# Patient Record
Sex: FEMALE | Race: WHITE | Employment: OTHER | URBAN - NONMETROPOLITAN AREA
[De-identification: names, ages, dates, MRNs, and addresses within clinical notes are randomized per-mention and may not be internally consistent; named-entity substitution may affect disease eponyms.]

---

## 2017-07-21 ENCOUNTER — HOSPITAL ENCOUNTER (OUTPATIENT)
Dept: GENERAL RADIOLOGY | Age: 82
Discharge: HOME | End: 2017-07-21
Payer: MEDICARE

## 2017-07-21 ENCOUNTER — HOSPITAL ENCOUNTER (OUTPATIENT)
Dept: INFUSION THERAPY | Age: 82
Discharge: HOME | End: 2017-07-21
Payer: MEDICARE

## 2017-07-21 ENCOUNTER — HOSPITAL ENCOUNTER (OUTPATIENT)
Age: 82
Discharge: HOME | End: 2017-07-21
Payer: MEDICARE

## 2017-07-21 VITALS
SYSTOLIC BLOOD PRESSURE: 144 MMHG | RESPIRATION RATE: 18 BRPM | OXYGEN SATURATION: 95 % | DIASTOLIC BLOOD PRESSURE: 71 MMHG | TEMPERATURE: 98.3 F | HEART RATE: 77 BPM

## 2017-07-21 DIAGNOSIS — R06.02 SHORTNESS OF BREATH: ICD-10-CM

## 2017-07-21 LAB
ABSOLUTE EOS #: 0.24 K/UL (ref 0–0.4)
ABSOLUTE LYMPH #: 1.59 K/UL (ref 1–4.8)
ABSOLUTE MONO #: 0.53 K/UL (ref 0–1)
ANION GAP SERPL CALCULATED.3IONS-SCNC: 14 MMOL/L (ref 9–17)
BASOPHILS # BLD: 0 %
BASOPHILS ABSOLUTE: 0 K/UL (ref 0–0.2)
BUN BLDV-MCNC: 19 MG/DL (ref 8–23)
BUN/CREAT BLD: 18 (ref 9–20)
CALCIUM SERPL-MCNC: 8.6 MG/DL (ref 8.6–10.4)
CHLORIDE BLD-SCNC: 106 MMOL/L (ref 98–107)
CO2: 19 MMOL/L (ref 20–31)
CREAT SERPL-MCNC: 1.04 MG/DL (ref 0.5–0.9)
DIFFERENTIAL TYPE: ABNORMAL
EOSINOPHILS RELATIVE PERCENT: 4 %
GFR AFRICAN AMERICAN: >60 ML/MIN
GFR NON-AFRICAN AMERICAN: 50 ML/MIN
GFR SERPL CREATININE-BSD FRML MDRD: ABNORMAL ML/MIN/{1.73_M2}
GFR SERPL CREATININE-BSD FRML MDRD: ABNORMAL ML/MIN/{1.73_M2}
GLUCOSE BLD-MCNC: 103 MG/DL (ref 70–99)
HCT VFR BLD CALC: 20.3 % (ref 36–46)
HCT VFR BLD CALC: 25.4 % (ref 36–46)
HEMOGLOBIN: 6.4 G/DL (ref 12–16)
HEMOGLOBIN: 8.4 G/DL (ref 12–16)
LYMPHOCYTES # BLD: 27 %
MCH RBC QN AUTO: 24.1 PG (ref 26–34)
MCH RBC QN AUTO: 25.8 PG (ref 26–34)
MCHC RBC AUTO-ENTMCNC: 31.7 G/DL (ref 31–37)
MCHC RBC AUTO-ENTMCNC: 33 G/DL (ref 31–37)
MCV RBC AUTO: 75.9 FL (ref 80–100)
MCV RBC AUTO: 78.3 FL (ref 80–100)
MONOCYTES # BLD: 9 %
MORPHOLOGY: ABNORMAL
PDW BLD-RTO: 16.5 % (ref 12.1–15.2)
PDW BLD-RTO: 16.5 % (ref 12.1–15.2)
PLATELET # BLD: 484 K/UL (ref 140–450)
PLATELET # BLD: 532 K/UL (ref 140–450)
PLATELET ESTIMATE: ABNORMAL
PMV BLD AUTO: 7.9 FL (ref 6–12)
PMV BLD AUTO: 8 FL (ref 6–12)
POTASSIUM SERPL-SCNC: 4 MMOL/L (ref 3.7–5.3)
RBC # BLD: 2.67 M/UL (ref 4–5.2)
RBC # BLD: 3.25 M/UL (ref 4–5.2)
RBC # BLD: ABNORMAL 10*6/UL
SEG NEUTROPHILS: 60 %
SEGMENTED NEUTROPHILS ABSOLUTE COUNT: 3.54 K/UL (ref 1.8–7.7)
SODIUM BLD-SCNC: 139 MMOL/L (ref 135–144)
WBC # BLD: 5.9 K/UL (ref 3.5–11)
WBC # BLD: 6 K/UL (ref 3.5–11)
WBC # BLD: ABNORMAL 10*3/UL

## 2017-07-21 PROCEDURE — 36430 TRANSFUSION BLD/BLD COMPNT: CPT

## 2017-07-21 PROCEDURE — 86901 BLOOD TYPING SEROLOGIC RH(D): CPT

## 2017-07-21 PROCEDURE — 86920 COMPATIBILITY TEST SPIN: CPT

## 2017-07-21 PROCEDURE — 2580000003 HC RX 258: Performed by: INTERNAL MEDICINE

## 2017-07-21 PROCEDURE — 71020 XR CHEST STANDARD TWO VW: CPT

## 2017-07-21 PROCEDURE — 86900 BLOOD TYPING SEROLOGIC ABO: CPT

## 2017-07-21 PROCEDURE — 86850 RBC ANTIBODY SCREEN: CPT

## 2017-07-21 PROCEDURE — 80048 BASIC METABOLIC PNL TOTAL CA: CPT

## 2017-07-21 PROCEDURE — P9040 RBC LEUKOREDUCED IRRADIATED: HCPCS

## 2017-07-21 PROCEDURE — 36415 COLL VENOUS BLD VENIPUNCTURE: CPT

## 2017-07-21 PROCEDURE — 85027 COMPLETE CBC AUTOMATED: CPT

## 2017-07-21 PROCEDURE — 85025 COMPLETE CBC W/AUTO DIFF WBC: CPT

## 2017-07-21 RX ORDER — SODIUM CHLORIDE 9 MG/ML
INJECTION, SOLUTION INTRAVENOUS ONCE
Status: COMPLETED | OUTPATIENT
Start: 2017-07-21 | End: 2017-07-21

## 2017-07-21 RX ORDER — MONTELUKAST SODIUM 10 MG/1
10 TABLET ORAL NIGHTLY
COMMUNITY

## 2017-07-21 RX ORDER — 0.9 % SODIUM CHLORIDE 0.9 %
10 VIAL (ML) INJECTION PRN
Status: DISCONTINUED | OUTPATIENT
Start: 2017-07-21 | End: 2017-07-22 | Stop reason: HOSPADM

## 2017-07-21 RX ADMIN — Medication 10 ML: at 13:13

## 2017-07-21 RX ADMIN — SODIUM CHLORIDE: 9 INJECTION, SOLUTION INTRAVENOUS at 14:16

## 2017-07-21 NOTE — IP AVS SNAPSHOT
Patient Information     Patient Name LUANN Del Castillo 1927      Important Information for Heart Failure       If your condition worsens or if you have any concerns, call your doctor or seek emergency medical services (dial 9--1) as needed. If you have any of the following symptoms/conditions, call your doctor. Call your primary care physician to obtain results of outstanding lab tests, cultures, x-rays, or other tests. If you have a current diagnosis or history of any of the following, please review the information carefully. HEART FAILURE PATIENTS:   DISCHARGE WEIGHT:    Record daily weights. Notify your doctor if you have a weight gain 2 pounds in a day, or 3-5 pounds in 1 week. Notify your doctor for increased shortness of breath, or swelling in legs or feet. Follow a low sodium diet. Resume normal activity unless otherwise instructed by your doctor.

## 2017-07-21 NOTE — IP AVS SNAPSHOT
After Visit Summary  (Discharge Instructions)    Medication List for Home    Based on the information you provided to us as well as any changes during this visit, the following is your updated medication list.  Compare this with your prescription bottles at home. If you have any questions or concerns, contact your primary care physician's office. Daily Medication List (This medication list can be shared with any healthcare provider who is helping you manage your medications)      ASK your doctor about these medications if you have questions        Last Dose    Next Dose Due AM NOON PM NIGHT    ACCUPRIL 5 MG tablet   Generic drug:  quinapril   Take 5 mg by mouth nightly                                         levothyroxine 75 MCG tablet   Commonly known as:  SYNTHROID   Take 75 mcg by mouth Daily                                         metoprolol succinate 50 MG extended release tablet   Commonly known as:  TOPROL XL   Take 1.5 tablets by mouth daily                                         montelukast 10 MG tablet   Commonly known as:  SINGULAIR   Take 10 mg by mouth nightly                                         PROTONIX PO   Take 40 mg by mouth                                         vitamin B-12 1000 MCG tablet   Commonly known as:  CYANOCOBALAMIN   Take 1,000 mcg by mouth nightly                                                 Allergies as of 7/21/2017        Reactions    Sulfa Antibiotics       Immunizations as of 7/21/2017     Name Date Dose VIS Date Route    Influenza Vaccine, unspecified formulation 10/12/2015 -- -- --    Comment: uploaded via claim file      Last Vitals          Most Recent Value    Temp  98.3 °F (36.8 °C)    Pulse  73    Resp  16    BP  118/60         After Visit Summary    This summary was created for you. Thank you for entrusting your care to us.   The following information includes details about your hospital/visit stay along with steps you should take to help with your recovery once you leave the hospital.  In this packet, you will find information about the topics listed below:    · Instructions about your medications including a list of your home medications  · A summary of your hospital visit  · Follow-up appointments once you have left the hospital  · Your care plan at home      You may receive a survey regarding the care you received during your stay. Your input is valuable to us. We encourage you to complete and return your survey in the envelope provided. We hope you will choose us in the future for your healthcare needs. Patient Information     Patient Name LUANN Salvador 1927      Care Provided at:     Name Address Phone       [place] [phone]            Your Visit    Here you will find information about your visit, including the reason for your visit. Please take this sheet with you when you visit your doctor or other health care provider in the future. It will help determine the best possible medical care for you at that time. If you have any questions once you leave the hospital, please call the department phone number listed below. Why you were here     Your primary diagnosis was:  Not on File      Visit Information     Date & Time Department Dept. Phone    2017 [address] [place] [address] ([facility])        Follow-up Appointments    Below is a list of your follow-up and future appointments. This may not be a complete list as you may have made appointments directly with providers that we are not aware of or your providers may have made some for you. Please call your providers to confirm appointments. It is important to keep your appointments. Please bring your current insurance card, photo ID, co-pay, and all medication bottles to your appointment. If self-pay, payment is expected at the time of service.         Preventive Care        Date Due Tetanus Combination Vaccine (1 - Tdap) 12/11/1946    Zoster Vaccine 12/11/1987    Pneumococcal Vaccines (two) for all adults aged 72 and over (1 of 2 - PCV13) 12/11/1992    Yearly Flu Vaccine (1) 8/1/2017                 Care Plan Once You Return Home    This section includes instructions you will need to follow once you leave the hospital.  Your care team will discuss these with you, so you and those caring for you know how to best care for your health needs at home. This section may also include educational information about certain health topics that may be of help to you. Discharge Instructions                                         Outpatient Instructions for Transfusions    [address] [facility]  [phone]        Diet:  As prescribed by your physician    Activity:  As directed by your physician    Care of the IV site:  Keep the IV site clean and dry until healed. Notify your physician if the area becomes reddened, swollen or tender. If you observe any of the following symptoms please contact you doctor. · Fever with or without chills (within in 2 weeks)  · Hives or itching  · Chest pain  · Nausea  · Flushing  · Shortness of Breath  · Bleeding  · Blood in your urine  · Mild jaundice - yellowness of your skin or the whites of your eyes (within 2 weeks)        If any adverse reactions occur:  CALL YOUR PHYSICIAN IMMEDIATELY. Prescriptions: Follow up appointment: follow up with Dr. Pauly Mitchell information for a smoker       SMOKING: QUIT SMOKING. THIS IS THE MOST IMPORTANT ACTION YOU CAN TAKE TO IMPROVE YOUR CURRENT AND FUTURE HEALTH. Call the [address] [place] [address] at Alex NOW ([phone])    Smoking harms nonsmokers. When nonsmokers are around people who smoke, they absorb nicotine, carbon monoxide, and other ingredients of tobacco smoke.      DO NOT SMOKE AROUND CHILDREN Children exposed to secondhand smoke are at an increased risk of:  Sudden Infant Death Syndrome (SIDS), acute respiratory infections, inflammation of the middle ear, and severe asthma. Over a longer time, it causes heart disease and lung cancer. There is no safe level of exposure to secondhand smoke. c-LEctahart Signup     V[employer]ix allows you to send messages to your doctor, view your test results, renew your prescriptions, schedule appointments, view visit notes, and more. How Do I Sign Up? 1. In your Internet browser, go to [email or web address]  2. Click on the Sign Up Now link in the Sign In box. You will see the New Member Sign Up page. 3. Enter your V[employer]ix Access Code exactly as it appears below. You will not need to use this code after youve completed the sign-up process. If you do not sign up before the expiration date, you must request a new code. [employer] Access Code: [email or web address]  Expires: 9/19/2017  3:14 PM    4. Enter your Social Security Number ([id number]) and Date of Birth (mm/dd/yyyy) as indicated and click Submit. You will be taken to the next sign-up page. 5. Create a Vessix ID. This will be your Vessix login ID and cannot be changed, so think of one that is secure and easy to remember. 6. Create a V[employer]ix password. You can change your password at any time. 7. Enter your Password Reset Question and Answer. This can be used at a later time if you forget your password. 8. Enter your e-mail address. You will receive e-mail notification when new information is available in [address] 9. Click Sign Up. You can now view your medical record. Additional Information  If you have questions, please contact the physician practice where you receive care. Remember, V[employer]ix is NOT to be used for urgent needs. For medical emergencies, dial 911. For questions regarding your [employer] account call [phone].  If you

## 2017-07-21 NOTE — PROGRESS NOTES
1620 transferred to Room 304 per cart accompied by family. PRBC continue to infuse. Report given to ROSE MARY Daniels RN

## 2017-07-22 LAB
ABO/RH: NORMAL
ANTIBODY SCREEN: NEGATIVE
ARM BAND NUMBER: NORMAL
BLD PROD TYP BPU: NORMAL
CROSSMATCH RESULT: NORMAL
DISPENSE STATUS BLOOD BANK: NORMAL
EXPIRATION DATE: NORMAL
TRANSFUSION STATUS: NORMAL
UNIT DIVISION: 0
UNIT NUMBER: NORMAL

## 2017-07-25 ENCOUNTER — HOSPITAL ENCOUNTER (OUTPATIENT)
Age: 82
Discharge: HOME | End: 2017-07-25
Payer: MEDICARE

## 2017-07-25 LAB
ABSOLUTE EOS #: 0.15 K/UL (ref 0–0.4)
ABSOLUTE LYMPH #: 1.53 K/UL (ref 1–4.8)
ABSOLUTE MONO #: 0.51 K/UL (ref 0–1)
BASOPHILS # BLD: 0 %
BASOPHILS ABSOLUTE: 0 K/UL (ref 0–0.2)
DIFFERENTIAL TYPE: ABNORMAL
EOSINOPHILS RELATIVE PERCENT: 2 %
HCT VFR BLD CALC: 29.6 % (ref 36–46)
HEMOGLOBIN: 9.8 G/DL (ref 12–16)
LYMPHOCYTES # BLD: 21 %
MCH RBC QN AUTO: 25.9 PG (ref 26–34)
MCHC RBC AUTO-ENTMCNC: 33.2 G/DL (ref 31–37)
MCV RBC AUTO: 78.1 FL (ref 80–100)
MONOCYTES # BLD: 7 %
MORPHOLOGY: ABNORMAL
PDW BLD-RTO: 17.3 % (ref 12.1–15.2)
PLATELET # BLD: 506 K/UL (ref 140–450)
PLATELET ESTIMATE: ABNORMAL
PMV BLD AUTO: 8.1 FL (ref 6–12)
RBC # BLD: 3.79 M/UL (ref 4–5.2)
RBC # BLD: ABNORMAL 10*6/UL
SEG NEUTROPHILS: 70 %
SEGMENTED NEUTROPHILS ABSOLUTE COUNT: 5.11 K/UL (ref 1.8–7.7)
WBC # BLD: 7.3 K/UL (ref 3.5–11)
WBC # BLD: ABNORMAL 10*3/UL

## 2017-07-25 PROCEDURE — 85025 COMPLETE CBC W/AUTO DIFF WBC: CPT

## 2017-07-25 PROCEDURE — 36415 COLL VENOUS BLD VENIPUNCTURE: CPT

## 2017-09-05 ENCOUNTER — HOSPITAL ENCOUNTER (OUTPATIENT)
Age: 82
Discharge: HOME | End: 2017-09-05
Payer: MEDICARE

## 2017-09-05 ENCOUNTER — HOSPITAL ENCOUNTER (OUTPATIENT)
Dept: GENERAL RADIOLOGY | Age: 82
Discharge: HOME | End: 2017-09-05
Payer: MEDICARE

## 2017-09-05 DIAGNOSIS — J20.9 ACUTE BRONCHITIS, UNSPECIFIED ORGANISM: ICD-10-CM

## 2017-09-05 LAB
ABSOLUTE EOS #: 0.29 K/UL (ref 0–0.4)
ABSOLUTE LYMPH #: 1.55 K/UL (ref 1–4.8)
ABSOLUTE MONO #: 0.68 K/UL (ref 0–1)
ANION GAP SERPL CALCULATED.3IONS-SCNC: 14 MMOL/L (ref 9–17)
BASOPHILS # BLD: 0 %
BASOPHILS ABSOLUTE: 0 K/UL (ref 0–0.2)
BUN BLDV-MCNC: 17 MG/DL (ref 8–23)
BUN/CREAT BLD: 17 (ref 9–20)
CALCIUM SERPL-MCNC: 8.9 MG/DL (ref 8.6–10.4)
CHLORIDE BLD-SCNC: 102 MMOL/L (ref 98–107)
CO2: 21 MMOL/L (ref 20–31)
CREAT SERPL-MCNC: 1.03 MG/DL (ref 0.5–0.9)
DIFFERENTIAL TYPE: ABNORMAL
EOSINOPHILS RELATIVE PERCENT: 3 %
GFR AFRICAN AMERICAN: >60 ML/MIN
GFR NON-AFRICAN AMERICAN: 50 ML/MIN
GFR SERPL CREATININE-BSD FRML MDRD: ABNORMAL ML/MIN/{1.73_M2}
GFR SERPL CREATININE-BSD FRML MDRD: ABNORMAL ML/MIN/{1.73_M2}
GLUCOSE BLD-MCNC: 114 MG/DL (ref 70–99)
HCT VFR BLD CALC: 30.1 % (ref 36–46)
HEMOGLOBIN: 9.8 G/DL (ref 12–16)
LYMPHOCYTES # BLD: 16 %
MCH RBC QN AUTO: 25 PG (ref 26–34)
MCHC RBC AUTO-ENTMCNC: 32.5 G/DL (ref 31–37)
MCV RBC AUTO: 76.8 FL (ref 80–100)
MONOCYTES # BLD: 7 %
MORPHOLOGY: ABNORMAL
PDW BLD-RTO: 20.2 % (ref 12.1–15.2)
PLATELET # BLD: 432 K/UL (ref 140–450)
PLATELET ESTIMATE: ABNORMAL
PMV BLD AUTO: 7.4 FL (ref 6–12)
POTASSIUM SERPL-SCNC: 4.4 MMOL/L (ref 3.7–5.3)
RBC # BLD: 3.92 M/UL (ref 4–5.2)
RBC # BLD: ABNORMAL 10*6/UL
SEG NEUTROPHILS: 74 %
SEGMENTED NEUTROPHILS ABSOLUTE COUNT: 7.18 K/UL (ref 1.8–7.7)
SODIUM BLD-SCNC: 137 MMOL/L (ref 135–144)
WBC # BLD: 9.7 K/UL (ref 3.5–11)
WBC # BLD: ABNORMAL 10*3/UL

## 2017-09-05 PROCEDURE — 85025 COMPLETE CBC W/AUTO DIFF WBC: CPT

## 2017-09-05 PROCEDURE — 36415 COLL VENOUS BLD VENIPUNCTURE: CPT

## 2017-09-05 PROCEDURE — 80048 BASIC METABOLIC PNL TOTAL CA: CPT

## 2017-09-05 PROCEDURE — 71020 XR CHEST STANDARD TWO VW: CPT

## 2017-09-07 ENCOUNTER — HOSPITAL ENCOUNTER (OUTPATIENT)
Age: 82
Discharge: HOME | End: 2017-09-07
Payer: MEDICARE

## 2017-09-08 ENCOUNTER — HOSPITAL ENCOUNTER (OUTPATIENT)
Age: 82
Discharge: HOME | End: 2017-09-08
Payer: MEDICARE

## 2017-09-08 PROCEDURE — 87493 C DIFF AMPLIFIED PROBE: CPT

## 2017-09-12 LAB
C DIFFICILE TOXINS, PCR: ABNORMAL
SPECIMEN DESCRIPTION: ABNORMAL

## 2018-06-14 ENCOUNTER — HOSPITAL ENCOUNTER (OUTPATIENT)
Age: 83
Discharge: HOME | End: 2018-06-14
Payer: MEDICARE

## 2018-06-14 LAB
ABSOLUTE EOS #: 0.15 K/UL (ref 0–0.44)
ABSOLUTE IMMATURE GRANULOCYTE: <0.03 K/UL (ref 0–0.3)
ABSOLUTE LYMPH #: 1.27 K/UL (ref 1.1–3.7)
ABSOLUTE MONO #: 0.5 K/UL (ref 0.1–1.2)
ALBUMIN SERPL-MCNC: 4.2 G/DL (ref 3.5–5.2)
ALBUMIN/GLOBULIN RATIO: 1.3 (ref 1–2.5)
ALP BLD-CCNC: 108 U/L (ref 35–104)
ALT SERPL-CCNC: 9 U/L (ref 5–33)
ANION GAP SERPL CALCULATED.3IONS-SCNC: 18 MMOL/L (ref 9–17)
AST SERPL-CCNC: 18 U/L
BASOPHILS # BLD: 0 % (ref 0–2)
BASOPHILS ABSOLUTE: <0.03 K/UL (ref 0–0.2)
BILIRUB SERPL-MCNC: 0.21 MG/DL (ref 0.3–1.2)
BUN BLDV-MCNC: 12 MG/DL (ref 8–23)
CALCIUM SERPL-MCNC: 9.3 MG/DL (ref 8.6–10.4)
CHLORIDE BLD-SCNC: 92 MMOL/L (ref 98–107)
CO2: 21 MMOL/L (ref 20–31)
CREAT SERPL-MCNC: 0.99 MG/DL (ref 0.5–0.9)
DIFFERENTIAL TYPE: ABNORMAL
EOSINOPHILS RELATIVE PERCENT: 3 % (ref 1–4)
GFR AFRICAN AMERICAN: >60 ML/MIN
GFR NON-AFRICAN AMERICAN: 53 ML/MIN
GFR SERPL CREATININE-BSD FRML MDRD: ABNORMAL ML/MIN/{1.73_M2}
GFR SERPL CREATININE-BSD FRML MDRD: ABNORMAL ML/MIN/{1.73_M2}
GLUCOSE BLD-MCNC: 125 MG/DL (ref 70–99)
HCT VFR BLD CALC: 31.5 % (ref 36.3–47.1)
HEMOGLOBIN: 9.5 G/DL (ref 11.9–15.1)
IMMATURE GRANULOCYTES: 0 %
IRON SATURATION: 9 % (ref 20–55)
IRON: 34 UG/DL (ref 37–145)
LYMPHOCYTES # BLD: 23 % (ref 24–43)
MCH RBC QN AUTO: 22.5 PG (ref 25.2–33.5)
MCHC RBC AUTO-ENTMCNC: 30.2 G/DL (ref 28.4–34.8)
MCV RBC AUTO: 74.5 FL (ref 82.6–102.9)
MONOCYTES # BLD: 9 % (ref 3–12)
NRBC AUTOMATED: 0 PER 100 WBC
PDW BLD-RTO: 18.8 % (ref 11.8–14.4)
PHOSPHORUS: 3.8 MG/DL (ref 2.6–4.5)
PLATELET # BLD: 403 K/UL (ref 138–453)
PLATELET ESTIMATE: ABNORMAL
PMV BLD AUTO: 9.1 FL (ref 8.1–13.5)
POTASSIUM SERPL-SCNC: 3.7 MMOL/L (ref 3.7–5.3)
RBC # BLD: 4.23 M/UL (ref 3.95–5.11)
RBC # BLD: ABNORMAL 10*6/UL
SEG NEUTROPHILS: 65 % (ref 36–65)
SEGMENTED NEUTROPHILS ABSOLUTE COUNT: 3.5 K/UL (ref 1.5–8.1)
SODIUM BLD-SCNC: 131 MMOL/L (ref 135–144)
TOTAL IRON BINDING CAPACITY: 367 UG/DL (ref 250–450)
TOTAL PROTEIN: 7.5 G/DL (ref 6.4–8.3)
TSH SERPL DL<=0.05 MIU/L-ACNC: 0.4 MIU/L (ref 0.3–5)
UNSATURATED IRON BINDING CAPACITY: 332.8 UG/DL (ref 112–347)
WBC # BLD: 5.5 K/UL (ref 3.5–11.3)
WBC # BLD: ABNORMAL 10*3/UL

## 2018-06-14 PROCEDURE — 85025 COMPLETE CBC W/AUTO DIFF WBC: CPT

## 2018-06-14 PROCEDURE — 80053 COMPREHEN METABOLIC PANEL: CPT

## 2018-06-14 PROCEDURE — 36415 COLL VENOUS BLD VENIPUNCTURE: CPT

## 2018-06-14 PROCEDURE — 83540 ASSAY OF IRON: CPT

## 2018-06-14 PROCEDURE — 84100 ASSAY OF PHOSPHORUS: CPT

## 2018-06-14 PROCEDURE — 84443 ASSAY THYROID STIM HORMONE: CPT

## 2018-06-14 PROCEDURE — 83550 IRON BINDING TEST: CPT

## 2019-07-15 ENCOUNTER — APPOINTMENT (OUTPATIENT)
Dept: NON INVASIVE DIAGNOSTICS | Age: 84
DRG: 291 | End: 2019-07-15
Attending: INTERNAL MEDICINE
Payer: MEDICARE

## 2019-07-15 ENCOUNTER — HOSPITAL ENCOUNTER (INPATIENT)
Age: 84
LOS: 6 days | Discharge: HOME | DRG: 291 | End: 2019-07-21
Attending: INTERNAL MEDICINE | Admitting: INTERNAL MEDICINE
Payer: MEDICARE

## 2019-07-15 ENCOUNTER — APPOINTMENT (OUTPATIENT)
Dept: GENERAL RADIOLOGY | Age: 84
DRG: 291 | End: 2019-07-15
Attending: INTERNAL MEDICINE
Payer: MEDICARE

## 2019-07-15 DIAGNOSIS — I50.33 ACUTE ON CHRONIC DIASTOLIC CONGESTIVE HEART FAILURE (HCC): Primary | ICD-10-CM

## 2019-07-15 PROBLEM — J44.1 ACUTE EXACERBATION OF CHRONIC OBSTRUCTIVE PULMONARY DISEASE (COPD) (HCC): Status: ACTIVE | Noted: 2019-07-15

## 2019-07-15 LAB
ABSOLUTE EOS #: <0.03 K/UL (ref 0–0.44)
ABSOLUTE IMMATURE GRANULOCYTE: 0.05 K/UL (ref 0–0.3)
ABSOLUTE LYMPH #: 1.41 K/UL (ref 1.1–3.7)
ABSOLUTE MONO #: 1.1 K/UL (ref 0.1–1.2)
ANION GAP SERPL CALCULATED.3IONS-SCNC: 15 MMOL/L (ref 9–17)
BASOPHILS # BLD: 0 % (ref 0–2)
BASOPHILS ABSOLUTE: <0.03 K/UL (ref 0–0.2)
BNP INTERPRETATION: ABNORMAL
BUN BLDV-MCNC: 14 MG/DL (ref 8–23)
BUN/CREAT BLD: 15 (ref 9–20)
CALCIUM SERPL-MCNC: 8.7 MG/DL (ref 8.6–10.4)
CHLORIDE BLD-SCNC: 101 MMOL/L (ref 98–107)
CO2: 20 MMOL/L (ref 20–31)
CREAT SERPL-MCNC: 0.93 MG/DL (ref 0.5–0.9)
DIFFERENTIAL TYPE: ABNORMAL
EOSINOPHILS RELATIVE PERCENT: 0 % (ref 1–4)
GFR AFRICAN AMERICAN: >60 ML/MIN
GFR NON-AFRICAN AMERICAN: 57 ML/MIN
GFR SERPL CREATININE-BSD FRML MDRD: ABNORMAL ML/MIN/{1.73_M2}
GFR SERPL CREATININE-BSD FRML MDRD: ABNORMAL ML/MIN/{1.73_M2}
GLUCOSE BLD-MCNC: 124 MG/DL (ref 70–99)
HCT VFR BLD CALC: 25.9 % (ref 36.3–47.1)
HEMOGLOBIN: 7.9 G/DL (ref 11.9–15.1)
IMMATURE GRANULOCYTES: 0 %
LV EF: 65 %
LVEF MODALITY: NORMAL
LYMPHOCYTES # BLD: 11 % (ref 24–43)
MCH RBC QN AUTO: 23.9 PG (ref 25.2–33.5)
MCHC RBC AUTO-ENTMCNC: 30.5 G/DL (ref 28.4–34.8)
MCV RBC AUTO: 78.2 FL (ref 82.6–102.9)
MONOCYTES # BLD: 9 % (ref 3–12)
NRBC AUTOMATED: 0 PER 100 WBC
PDW BLD-RTO: 18.1 % (ref 11.8–14.4)
PLATELET # BLD: 286 K/UL (ref 138–453)
PLATELET ESTIMATE: ABNORMAL
PMV BLD AUTO: 9.4 FL (ref 8.1–13.5)
POTASSIUM SERPL-SCNC: 3.7 MMOL/L (ref 3.7–5.3)
PRO-BNP: 1258 PG/ML
RBC # BLD: 3.31 M/UL (ref 3.95–5.11)
RBC # BLD: ABNORMAL 10*6/UL
SEG NEUTROPHILS: 80 % (ref 36–65)
SEGMENTED NEUTROPHILS ABSOLUTE COUNT: 10.41 K/UL (ref 1.5–8.1)
SODIUM BLD-SCNC: 136 MMOL/L (ref 135–144)
WBC # BLD: 13 K/UL (ref 3.5–11.3)
WBC # BLD: ABNORMAL 10*3/UL

## 2019-07-15 PROCEDURE — 71045 X-RAY EXAM CHEST 1 VIEW: CPT

## 2019-07-15 PROCEDURE — 94664 DEMO&/EVAL PT USE INHALER: CPT

## 2019-07-15 PROCEDURE — 86900 BLOOD TYPING SEROLOGIC ABO: CPT

## 2019-07-15 PROCEDURE — 85025 COMPLETE CBC W/AUTO DIFF WBC: CPT

## 2019-07-15 PROCEDURE — 86901 BLOOD TYPING SEROLOGIC RH(D): CPT

## 2019-07-15 PROCEDURE — 1200000000 HC SEMI PRIVATE

## 2019-07-15 PROCEDURE — 80048 BASIC METABOLIC PNL TOTAL CA: CPT

## 2019-07-15 PROCEDURE — 2580000003 HC RX 258: Performed by: INTERNAL MEDICINE

## 2019-07-15 PROCEDURE — 83880 ASSAY OF NATRIURETIC PEPTIDE: CPT

## 2019-07-15 PROCEDURE — 6360000002 HC RX W HCPCS: Performed by: INTERNAL MEDICINE

## 2019-07-15 PROCEDURE — 93306 TTE W/DOPPLER COMPLETE: CPT

## 2019-07-15 PROCEDURE — 86850 RBC ANTIBODY SCREEN: CPT

## 2019-07-15 PROCEDURE — 94760 N-INVAS EAR/PLS OXIMETRY 1: CPT

## 2019-07-15 PROCEDURE — 36415 COLL VENOUS BLD VENIPUNCTURE: CPT

## 2019-07-15 RX ORDER — FUROSEMIDE 10 MG/ML
INJECTION INTRAMUSCULAR; INTRAVENOUS
Status: DISPENSED
Start: 2019-07-15 | End: 2019-07-16

## 2019-07-15 RX ORDER — SODIUM CHLORIDE 0.9 % (FLUSH) 0.9 %
10 SYRINGE (ML) INJECTION PRN
Status: DISCONTINUED | OUTPATIENT
Start: 2019-07-15 | End: 2019-07-21 | Stop reason: HOSPADM

## 2019-07-15 RX ORDER — LANOLIN ALCOHOL/MO/W.PET/CERES
1000 CREAM (GRAM) TOPICAL NIGHTLY
Status: DISCONTINUED | OUTPATIENT
Start: 2019-07-15 | End: 2019-07-21 | Stop reason: HOSPADM

## 2019-07-15 RX ORDER — ONDANSETRON 2 MG/ML
4 INJECTION INTRAMUSCULAR; INTRAVENOUS EVERY 6 HOURS PRN
Status: DISCONTINUED | OUTPATIENT
Start: 2019-07-15 | End: 2019-07-21 | Stop reason: HOSPADM

## 2019-07-15 RX ORDER — SODIUM CHLORIDE 0.9 % (FLUSH) 0.9 %
10 SYRINGE (ML) INJECTION EVERY 12 HOURS SCHEDULED
Status: DISCONTINUED | OUTPATIENT
Start: 2019-07-15 | End: 2019-07-21 | Stop reason: HOSPADM

## 2019-07-15 RX ORDER — PANTOPRAZOLE SODIUM 40 MG/1
40 TABLET, DELAYED RELEASE ORAL
Status: DISCONTINUED | OUTPATIENT
Start: 2019-07-16 | End: 2019-07-21 | Stop reason: HOSPADM

## 2019-07-15 RX ORDER — LEVOTHYROXINE SODIUM 0.07 MG/1
75 TABLET ORAL DAILY
Status: DISCONTINUED | OUTPATIENT
Start: 2019-07-16 | End: 2019-07-21 | Stop reason: HOSPADM

## 2019-07-15 RX ORDER — SODIUM CHLORIDE 9 MG/ML
INJECTION, SOLUTION INTRAVENOUS CONTINUOUS
Status: DISCONTINUED | OUTPATIENT
Start: 2019-07-15 | End: 2019-07-15

## 2019-07-15 RX ORDER — MONTELUKAST SODIUM 10 MG/1
10 TABLET ORAL NIGHTLY
Status: DISCONTINUED | OUTPATIENT
Start: 2019-07-15 | End: 2019-07-21 | Stop reason: HOSPADM

## 2019-07-15 RX ORDER — LISINOPRIL 5 MG/1
5 TABLET ORAL DAILY
Status: DISCONTINUED | OUTPATIENT
Start: 2019-07-16 | End: 2019-07-16

## 2019-07-15 RX ADMIN — ENOXAPARIN SODIUM 30 MG: 30 INJECTION SUBCUTANEOUS at 16:58

## 2019-07-15 RX ADMIN — Medication 10 ML: at 22:11

## 2019-07-15 RX ADMIN — FUROSEMIDE 5 MG/HR: 10 INJECTION, SOLUTION INTRAMUSCULAR; INTRAVENOUS at 21:29

## 2019-07-15 ASSESSMENT — PAIN SCALES - PAIN ASSESSMENT IN ADVANCED DEMENTIA (PAINAD)
CONSOLABILITY: 0
TOTALSCORE: 2
CONSOLABILITY: 0
FACIALEXPRESSION: 0
BODYLANGUAGE: 0
TOTALSCORE: 0
BREATHING: 0
NEGVOCALIZATION: 1
TOTALSCORE: 0
BREATHING: 0
CONSOLABILITY: 1
BREATHING: 0
BREATHING: 0
FACIALEXPRESSION: 0
BREATHING: 0
TOTALSCORE: 0
FACIALEXPRESSION: 0
FACIALEXPRESSION: 0
NEGVOCALIZATION: 0
NEGVOCALIZATION: 0
BODYLANGUAGE: 0
BODYLANGUAGE: 0
TOTALSCORE: 0
NEGVOCALIZATION: 0
BREATHING: 0
CONSOLABILITY: 0
CONSOLABILITY: 0
NEGVOCALIZATION: 0
BODYLANGUAGE: 0
TOTALSCORE: 0
FACIALEXPRESSION: 0
TOTALSCORE: 0
NEGVOCALIZATION: 0
CONSOLABILITY: 0
BREATHING: 0
CONSOLABILITY: 0
BODYLANGUAGE: 0
FACIALEXPRESSION: 0
BODYLANGUAGE: 0
BODYLANGUAGE: 0
NEGVOCALIZATION: 0
FACIALEXPRESSION: 0

## 2019-07-15 ASSESSMENT — PAIN SCALES - WONG BAKER
WONGBAKER_NUMERICALRESPONSE: 0
WONGBAKER_NUMERICALRESPONSE: 0
WONGBAKER_NUMERICALRESPONSE: 6
WONGBAKER_NUMERICALRESPONSE: 0

## 2019-07-15 ASSESSMENT — PAIN DESCRIPTION - DESCRIPTORS: DESCRIPTORS: ACHING

## 2019-07-15 ASSESSMENT — PAIN DESCRIPTION - PAIN TYPE: TYPE: CHRONIC PAIN

## 2019-07-15 ASSESSMENT — PAIN DESCRIPTION - LOCATION: LOCATION: GENERALIZED;KNEE

## 2019-07-15 ASSESSMENT — PAIN DESCRIPTION - FREQUENCY: FREQUENCY: INTERMITTENT

## 2019-07-15 NOTE — PROGRESS NOTES
[facility] SPECIALTY [facility]  OCCUPATIONAL THERAPY  No Visit Note    [] ICU    [x] Acute   Patient: Watson Cantu  Room: Critical access hospital3302-36      Watson Cantu not seen on 7/15/2019 at 2:54 PM due to nursing finishing IV and imaging present to complete imaging. Will re-attempt at our earliest opportunity.           Signature: Bruce Wood, MOT, OTR/L

## 2019-07-16 LAB
ABSOLUTE EOS #: 0.1 K/UL (ref 0–0.44)
ABSOLUTE IMMATURE GRANULOCYTE: 0.04 K/UL (ref 0–0.3)
ABSOLUTE LYMPH #: 1.69 K/UL (ref 1.1–3.7)
ABSOLUTE MONO #: 0.93 K/UL (ref 0.1–1.2)
ABSOLUTE RETIC #: 0.04 M/UL (ref 0.03–0.08)
ANION GAP SERPL CALCULATED.3IONS-SCNC: 13 MMOL/L (ref 9–17)
BASOPHILS # BLD: 0 % (ref 0–2)
BASOPHILS ABSOLUTE: <0.03 K/UL (ref 0–0.2)
BUN BLDV-MCNC: 12 MG/DL (ref 8–23)
BUN/CREAT BLD: 14 (ref 9–20)
CALCIUM SERPL-MCNC: 8 MG/DL (ref 8.6–10.4)
CHLORIDE BLD-SCNC: 102 MMOL/L (ref 98–107)
CO2: 20 MMOL/L (ref 20–31)
CREAT SERPL-MCNC: 0.87 MG/DL (ref 0.5–0.9)
DIFFERENTIAL TYPE: ABNORMAL
EKG ATRIAL RATE: 92 BPM
EKG P AXIS: 16 DEGREES
EKG P-R INTERVAL: 114 MS
EKG Q-T INTERVAL: 390 MS
EKG QRS DURATION: 70 MS
EKG QTC CALCULATION (BAZETT): 482 MS
EKG R AXIS: -7 DEGREES
EKG T AXIS: 77 DEGREES
EKG VENTRICULAR RATE: 92 BPM
EOSINOPHILS RELATIVE PERCENT: 1 % (ref 1–4)
FOLATE: 9 NG/ML
GFR AFRICAN AMERICAN: >60 ML/MIN
GFR NON-AFRICAN AMERICAN: >60 ML/MIN
GFR SERPL CREATININE-BSD FRML MDRD: ABNORMAL ML/MIN/{1.73_M2}
GFR SERPL CREATININE-BSD FRML MDRD: ABNORMAL ML/MIN/{1.73_M2}
GLUCOSE BLD-MCNC: 123 MG/DL (ref 70–99)
HCT VFR BLD CALC: 22.6 % (ref 36.3–47.1)
HEMOGLOBIN: 7 G/DL (ref 11.9–15.1)
IMMATURE GRANULOCYTES: 0 %
IMMATURE RETIC FRACT: 14.8 % (ref 2.7–18.3)
IRON SATURATION: 6 % (ref 20–55)
IRON: 17 UG/DL (ref 37–145)
LYMPHOCYTES # BLD: 18 % (ref 24–43)
MCH RBC QN AUTO: 23.8 PG (ref 25.2–33.5)
MCHC RBC AUTO-ENTMCNC: 31 G/DL (ref 28.4–34.8)
MCV RBC AUTO: 76.9 FL (ref 82.6–102.9)
MONOCYTES # BLD: 10 % (ref 3–12)
NRBC AUTOMATED: 0 PER 100 WBC
PDW BLD-RTO: 17.9 % (ref 11.8–14.4)
PLATELET # BLD: 268 K/UL (ref 138–453)
PLATELET ESTIMATE: ABNORMAL
PMV BLD AUTO: 9.8 FL (ref 8.1–13.5)
POTASSIUM SERPL-SCNC: 3 MMOL/L (ref 3.7–5.3)
RBC # BLD: 2.94 M/UL (ref 3.95–5.11)
RBC # BLD: ABNORMAL 10*6/UL
RETIC %: 1.3 % (ref 0.5–1.9)
RETIC HEMOGLOBIN: 21.7 PG (ref 28.2–35.7)
SEG NEUTROPHILS: 71 % (ref 36–65)
SEGMENTED NEUTROPHILS ABSOLUTE COUNT: 6.53 K/UL (ref 1.5–8.1)
SODIUM BLD-SCNC: 135 MMOL/L (ref 135–144)
TOTAL IRON BINDING CAPACITY: 276 UG/DL (ref 250–450)
UNSATURATED IRON BINDING CAPACITY: 259 UG/DL (ref 112–347)
VITAMIN B-12: 1148 PG/ML (ref 232–1245)
WBC # BLD: 9.3 K/UL (ref 3.5–11.3)
WBC # BLD: ABNORMAL 10*3/UL

## 2019-07-16 PROCEDURE — 93005 ELECTROCARDIOGRAM TRACING: CPT | Performed by: INTERNAL MEDICINE

## 2019-07-16 PROCEDURE — 1200000000 HC SEMI PRIVATE

## 2019-07-16 PROCEDURE — 93010 ELECTROCARDIOGRAM REPORT: CPT | Performed by: INTERNAL MEDICINE

## 2019-07-16 PROCEDURE — 83540 ASSAY OF IRON: CPT

## 2019-07-16 PROCEDURE — 97530 THERAPEUTIC ACTIVITIES: CPT

## 2019-07-16 PROCEDURE — 82607 VITAMIN B-12: CPT

## 2019-07-16 PROCEDURE — 80048 BASIC METABOLIC PNL TOTAL CA: CPT

## 2019-07-16 PROCEDURE — 83550 IRON BINDING TEST: CPT

## 2019-07-16 PROCEDURE — 99221 1ST HOSP IP/OBS SF/LOW 40: CPT | Performed by: INTERNAL MEDICINE

## 2019-07-16 PROCEDURE — 6360000002 HC RX W HCPCS: Performed by: INTERNAL MEDICINE

## 2019-07-16 PROCEDURE — 82746 ASSAY OF FOLIC ACID SERUM: CPT

## 2019-07-16 PROCEDURE — 97110 THERAPEUTIC EXERCISES: CPT

## 2019-07-16 PROCEDURE — 85025 COMPLETE CBC W/AUTO DIFF WBC: CPT

## 2019-07-16 PROCEDURE — 85045 AUTOMATED RETICULOCYTE COUNT: CPT

## 2019-07-16 PROCEDURE — 97166 OT EVAL MOD COMPLEX 45 MIN: CPT

## 2019-07-16 PROCEDURE — 97163 PT EVAL HIGH COMPLEX 45 MIN: CPT

## 2019-07-16 PROCEDURE — 90670 PCV13 VACCINE IM: CPT | Performed by: INTERNAL MEDICINE

## 2019-07-16 PROCEDURE — 36415 COLL VENOUS BLD VENIPUNCTURE: CPT

## 2019-07-16 PROCEDURE — 6370000000 HC RX 637 (ALT 250 FOR IP): Performed by: INTERNAL MEDICINE

## 2019-07-16 PROCEDURE — 2580000003 HC RX 258: Performed by: INTERNAL MEDICINE

## 2019-07-16 PROCEDURE — G0009 ADMIN PNEUMOCOCCAL VACCINE: HCPCS | Performed by: INTERNAL MEDICINE

## 2019-07-16 RX ORDER — LISINOPRIL 10 MG/1
10 TABLET ORAL EVERY 12 HOURS SCHEDULED
Status: DISCONTINUED | OUTPATIENT
Start: 2019-07-16 | End: 2019-07-21 | Stop reason: HOSPADM

## 2019-07-16 RX ORDER — SODIUM CHLORIDE 9 MG/ML
INJECTION, SOLUTION INTRAVENOUS CONTINUOUS
Status: DISCONTINUED | OUTPATIENT
Start: 2019-07-16 | End: 2019-07-19

## 2019-07-16 RX ADMIN — LISINOPRIL 10 MG: 10 TABLET ORAL at 20:59

## 2019-07-16 RX ADMIN — LEVOTHYROXINE SODIUM 75 MCG: 75 TABLET ORAL at 07:37

## 2019-07-16 RX ADMIN — LISINOPRIL 5 MG: 5 TABLET ORAL at 07:38

## 2019-07-16 RX ADMIN — ENOXAPARIN SODIUM 30 MG: 30 INJECTION SUBCUTANEOUS at 09:14

## 2019-07-16 RX ADMIN — SODIUM CHLORIDE: 9 INJECTION, SOLUTION INTRAVENOUS at 11:00

## 2019-07-16 RX ADMIN — CYANOCOBALAMIN TAB 1000 MCG 1000 MCG: 1000 TAB at 20:59

## 2019-07-16 RX ADMIN — PANTOPRAZOLE SODIUM 40 MG: 40 TABLET, DELAYED RELEASE ORAL at 07:37

## 2019-07-16 RX ADMIN — MONTELUKAST SODIUM 10 MG: 10 TABLET, FILM COATED ORAL at 20:59

## 2019-07-16 RX ADMIN — METOPROLOL SUCCINATE 75 MG: 50 TABLET, FILM COATED, EXTENDED RELEASE ORAL at 07:37

## 2019-07-16 RX ADMIN — PNEUMOCOCCAL 13-VALENT CONJUGATE VACCINE 0.5 ML: 2.2; 2.2; 2.2; 2.2; 2.2; 4.4; 2.2; 2.2; 2.2; 2.2; 2.2; 2.2; 2.2 INJECTION, SUSPENSION INTRAMUSCULAR at 14:15

## 2019-07-16 ASSESSMENT — PAIN SCALES - PAIN ASSESSMENT IN ADVANCED DEMENTIA (PAINAD)
NEGVOCALIZATION: 0
TOTALSCORE: 0
TOTALSCORE: 0
NEGVOCALIZATION: 0
NEGVOCALIZATION: 0
CONSOLABILITY: 0
CONSOLABILITY: 0
BODYLANGUAGE: 0
CONSOLABILITY: 0
FACIALEXPRESSION: 0
TOTALSCORE: 0
CONSOLABILITY: 0
FACIALEXPRESSION: 0
FACIALEXPRESSION: 0
BODYLANGUAGE: 0
BREATHING: 0
BREATHING: 0
FACIALEXPRESSION: 0
NEGVOCALIZATION: 0
CONSOLABILITY: 0
BREATHING: 0
BREATHING: 0
BODYLANGUAGE: 0
TOTALSCORE: 0
CONSOLABILITY: 0
FACIALEXPRESSION: 0
FACIALEXPRESSION: 0
BREATHING: 0
BREATHING: 0
FACIALEXPRESSION: 0
TOTALSCORE: 0
FACIALEXPRESSION: 0
NEGVOCALIZATION: 0
CONSOLABILITY: 0
NEGVOCALIZATION: 0
BREATHING: 0
BREATHING: 0
NEGVOCALIZATION: 0
CONSOLABILITY: 0
BODYLANGUAGE: 0
BREATHING: 0
TOTALSCORE: 0
NEGVOCALIZATION: 0
BODYLANGUAGE: 0
FACIALEXPRESSION: 0
TOTALSCORE: 0
TOTALSCORE: 0
BODYLANGUAGE: 0
CONSOLABILITY: 0
BODYLANGUAGE: 0
NEGVOCALIZATION: 0
TOTALSCORE: 0
BODYLANGUAGE: 0
CONSOLABILITY: 0
NEGVOCALIZATION: 0
FACIALEXPRESSION: 0
BREATHING: 0
TOTALSCORE: 0

## 2019-07-16 ASSESSMENT — PAIN SCALES - WONG BAKER
WONGBAKER_NUMERICALRESPONSE: 0

## 2019-07-16 NOTE — PROGRESS NOTES
Met with Patient and daughter this p.m. Patient is alert, non verbal throughout this visit. Daughter states that this is how she normally is mentally. Diagnosis of dementia noted. Patient is a 80year old  white female admitted with acute exacerbation of COPD. Patient lives with her daughter in daughters home in [facility] [facility] [place] [facility]. She uses a transport chair, a shower chair, and has hand rails on her commode. No outside services currently in place. Family provides for all of her needs. Has caregivers while daughter and son in law are working. PCP is Dr. Jaz Hampton. Daughter reports that they have no trouble in affording Patient's medications. Plan for Patient is to return home to her daughters home upon discharge. Patient is a 'DNR-CCA' code status. Has a DPOAHC on file. Discussed community resource options with daughter including respite funds available through our local [employer]. Daughter voices her appreciation, reassurance and support provided to her at this time. LSW to monitor and assist with discharge planning needs as appropriate.     Laurie. Danuta Carpenter[address]  7/16/2019

## 2019-07-16 NOTE — PROGRESS NOTES
Nutrition Assessment    Type and Reason for Visit: Initial    Nutrition Recommendations:   1. Continue current diet. 2. Encourage PO intakes. 3. F/u H/H and transfusion plan. Nutrition Assessment: Altered nutrition labs: H/H r/t unknown etiology AEB Hgb 7.0, transfusion plan after further diuresis. Pt with CHF and is undergoing diuresis. K+ is low. BNP 1258. Pt is asleep. Malnutrition Assessment:  · Malnutrition Status: At risk for malnutrition  · Context: Acute illness or injury  · Findings of the 6 clinical characteristics of malnutrition (Minimum of 2 out of 6 clinical characteristics is required to make the diagnosis of moderate or severe Protein Calorie Malnutrition based on AND/ASPEN Guidelines):  1. Energy Intake-Unable to assess,      2. Weight Loss-Unable to assess,    3. Fat Loss-Mild subcutaneous fat loss, Fat overlying the ribs  4. Muscle Loss-No significant muscle mass loss,    5. Fluid Accumulation-Unable to assess,    6.  Strength-Not measured    Nutrition Risk Level:  Moderate    Nutrient Needs:  · Estimated Daily Total Kcal: 2111-1238(86-36/FF)  · Estimated Daily Protein (g): 61-71g(1.2-1.4g/kg)  · Estimated Daily Total Fluid (ml/day): 1275 ml(25/kg)    Nutrition Diagnosis:   · Problem: Altered nutrition-related lab values(low H/H)  · Etiology: related to Other (Comment)(unknown etiology)     Signs and symptoms:  as evidenced by Lab values(Hgb 7.0, transfusion planned)    Objective Information:  · Nutrition-Focused Physical Findings: mild malnutrition indices  · Wound Type: None  · Current Nutrition Therapies:  · Oral Diet Orders: 2gm Sodium   · Oral Diet intake: Unable to assess  · Oral Nutrition Supplement (ONS) Orders: None  · Anthropometric Measures:  · Ht: 4' 9\" (144.8 cm)   · Current Body Wt: 112 lb 8 oz (51 kg)  · Admission Body Wt: 112 lb 7 oz (51 kg)  · Usual Body Wt: (limited historical weights)  · % Weight Change:  ,  (unknown)  · Ideal Body Wt: 94 lb (42.6 kg), %

## 2019-07-16 NOTE — PROGRESS NOTES
Karen Romero MD  Referral Date : 07/15/19  Diagnosis: Acute exacerbation of COPD, J44.1  Follows Commands: Impaired  Subjective  Subjective: Patient unable to verbalize pain level or use the visual pain scale due to her advanced dementia. She was sleeping comforably upon PT entry.   Pain Screening  Patient Currently in Pain: No          Orientation  Orientation  Overall Orientation Status: Impaired  Orientation Level: Unable to assess  Social/Functional History  Social/Functional History  Lives With: Daughter  Type of Home: House  Home Layout: One level  Home Access: Ramped entrance  Bathroom Shower/Tub: Tub/Shower unit, Shower chair with back  Home Equipment: [address] Help From: Family  ADL Assistance: Needs assistance  Homemaking Assistance: Needs assistance  Homemaking Responsibilities: No  Ambulation Assistance: Needs assistance  Transfer Assistance: Needs assistance  Active : No  Cognition   Cognition  Overall Cognitive Status: Exceptions  Arousal/Alertness: Inconsistent responses to stimuli  Following Commands: Inconsistently follows commands  Insights: Not aware of deficits  Initiation: Requires cues for all  Sequencing: Requires cues for all    Objective     Observation/Palpation  Posture: Poor    PROM RLE (degrees)  RLE PROM: WFL  PROM LLE (degrees)  LLE PROM: WFL  Strength RLE  Comment: Unable to assess due to advanced dementia  Strength LLE  Comment: Unable to assess due to advanced dementia        Bed mobility  Supine to Sit: Maximum assistance;2 Person assistance  Transfers  Sit to Stand: Maximum Assistance  Stand to sit: Maximum Assistance  Squat Pivot Transfers: Maximum Assistance  Ambulation  Ambulation?: No     Balance  Posture: Poor  Sitting - Static: Fair;-  Sitting - Dynamic: Poor  Standing - Static: Poor  Standing - Dynamic: Poor        Plan   Plan  Times per week: 7  Times per day: Twice a day  Plan Comment: 1x/day on weekends  Safety Devices  Type of devices: All fall risk precautions in place    Goals  Short term goals  Time Frame for Short term goals: 10 days  Short term goal 1: Patient will be moderate assistance with bed mobility  Short term goal 2: Patient will participate with stand pivot transfers and will require mod-max assist.  Patient Goals   Patient goals : unable to state goals due to dementia.   Her family member reports hoping the patient will be able to assist with stand pivot transfers       Therapy Time   Individual Concurrent Group Co-treatment   Time In 1043         Time Out [address] [facility], [place], [facility]

## 2019-07-17 PROBLEM — I50.33 ACUTE ON CHRONIC DIASTOLIC CONGESTIVE HEART FAILURE (HCC): Status: ACTIVE | Noted: 2019-07-15

## 2019-07-17 PROBLEM — G30.1 LATE ONSET ALZHEIMER DISEASE (HCC): Chronic | Status: ACTIVE | Noted: 2019-07-17

## 2019-07-17 PROBLEM — F02.80 LATE ONSET ALZHEIMER DISEASE (HCC): Chronic | Status: ACTIVE | Noted: 2019-07-17

## 2019-07-17 LAB
ABSOLUTE EOS #: 0.2 K/UL (ref 0–0.44)
ABSOLUTE IMMATURE GRANULOCYTE: 0.04 K/UL (ref 0–0.3)
ABSOLUTE LYMPH #: 1.76 K/UL (ref 1.1–3.7)
ABSOLUTE MONO #: 0.84 K/UL (ref 0.1–1.2)
ANION GAP SERPL CALCULATED.3IONS-SCNC: 14 MMOL/L (ref 9–17)
BASOPHILS # BLD: 0 % (ref 0–2)
BASOPHILS ABSOLUTE: <0.03 K/UL (ref 0–0.2)
BUN BLDV-MCNC: 17 MG/DL (ref 8–23)
BUN/CREAT BLD: 15 (ref 9–20)
CALCIUM SERPL-MCNC: 8 MG/DL (ref 8.6–10.4)
CHLORIDE BLD-SCNC: 100 MMOL/L (ref 98–107)
CO2: 21 MMOL/L (ref 20–31)
CREAT SERPL-MCNC: 1.13 MG/DL (ref 0.5–0.9)
DIFFERENTIAL TYPE: ABNORMAL
EOSINOPHILS RELATIVE PERCENT: 2 % (ref 1–4)
GFR AFRICAN AMERICAN: 55 ML/MIN
GFR NON-AFRICAN AMERICAN: 45 ML/MIN
GFR SERPL CREATININE-BSD FRML MDRD: ABNORMAL ML/MIN/{1.73_M2}
GFR SERPL CREATININE-BSD FRML MDRD: ABNORMAL ML/MIN/{1.73_M2}
GLUCOSE BLD-MCNC: 116 MG/DL (ref 70–99)
HCT VFR BLD CALC: 24.3 % (ref 36.3–47.1)
HEMOGLOBIN: 7.3 G/DL (ref 11.9–15.1)
IMMATURE GRANULOCYTES: 0 %
LYMPHOCYTES # BLD: 17 % (ref 24–43)
MCH RBC QN AUTO: 23.5 PG (ref 25.2–33.5)
MCHC RBC AUTO-ENTMCNC: 30 G/DL (ref 28.4–34.8)
MCV RBC AUTO: 78.1 FL (ref 82.6–102.9)
MONOCYTES # BLD: 8 % (ref 3–12)
NRBC AUTOMATED: 0 PER 100 WBC
PDW BLD-RTO: 17.8 % (ref 11.8–14.4)
PLATELET # BLD: 324 K/UL (ref 138–453)
PLATELET ESTIMATE: ABNORMAL
PMV BLD AUTO: 10.6 FL (ref 8.1–13.5)
POTASSIUM SERPL-SCNC: 3.3 MMOL/L (ref 3.7–5.3)
RBC # BLD: 3.11 M/UL (ref 3.95–5.11)
RBC # BLD: ABNORMAL 10*6/UL
SEG NEUTROPHILS: 73 % (ref 36–65)
SEGMENTED NEUTROPHILS ABSOLUTE COUNT: 7.34 K/UL (ref 1.5–8.1)
SODIUM BLD-SCNC: 135 MMOL/L (ref 135–144)
WBC # BLD: 10.2 K/UL (ref 3.5–11.3)
WBC # BLD: ABNORMAL 10*3/UL

## 2019-07-17 PROCEDURE — 36415 COLL VENOUS BLD VENIPUNCTURE: CPT

## 2019-07-17 PROCEDURE — 85025 COMPLETE CBC W/AUTO DIFF WBC: CPT

## 2019-07-17 PROCEDURE — 99232 SBSQ HOSP IP/OBS MODERATE 35: CPT | Performed by: INTERNAL MEDICINE

## 2019-07-17 PROCEDURE — 36430 TRANSFUSION BLD/BLD COMPNT: CPT

## 2019-07-17 PROCEDURE — 97110 THERAPEUTIC EXERCISES: CPT

## 2019-07-17 PROCEDURE — 80048 BASIC METABOLIC PNL TOTAL CA: CPT

## 2019-07-17 PROCEDURE — P9016 RBC LEUKOCYTES REDUCED: HCPCS

## 2019-07-17 PROCEDURE — 6370000000 HC RX 637 (ALT 250 FOR IP): Performed by: INTERNAL MEDICINE

## 2019-07-17 PROCEDURE — 1200000000 HC SEMI PRIVATE

## 2019-07-17 PROCEDURE — 2580000003 HC RX 258: Performed by: INTERNAL MEDICINE

## 2019-07-17 RX ORDER — 0.9 % SODIUM CHLORIDE 0.9 %
250 INTRAVENOUS SOLUTION INTRAVENOUS ONCE
Status: COMPLETED | OUTPATIENT
Start: 2019-07-17 | End: 2019-07-18

## 2019-07-17 RX ADMIN — PANTOPRAZOLE SODIUM 40 MG: 40 TABLET, DELAYED RELEASE ORAL at 07:32

## 2019-07-17 RX ADMIN — MONTELUKAST SODIUM 10 MG: 10 TABLET, FILM COATED ORAL at 21:22

## 2019-07-17 RX ADMIN — CYANOCOBALAMIN TAB 1000 MCG 1000 MCG: 1000 TAB at 21:22

## 2019-07-17 RX ADMIN — LISINOPRIL 10 MG: 10 TABLET ORAL at 08:52

## 2019-07-17 RX ADMIN — LISINOPRIL 10 MG: 10 TABLET ORAL at 21:22

## 2019-07-17 RX ADMIN — LEVOTHYROXINE SODIUM 75 MCG: 75 TABLET ORAL at 07:32

## 2019-07-17 RX ADMIN — SODIUM CHLORIDE 250 ML: 9 INJECTION, SOLUTION INTRAVENOUS at 15:35

## 2019-07-17 RX ADMIN — METOPROLOL SUCCINATE 75 MG: 50 TABLET, FILM COATED, EXTENDED RELEASE ORAL at 08:51

## 2019-07-17 ASSESSMENT — PAIN SCALES - PAIN ASSESSMENT IN ADVANCED DEMENTIA (PAINAD)
FACIALEXPRESSION: 0
CONSOLABILITY: 0
BREATHING: 0
NEGVOCALIZATION: 0
TOTALSCORE: 0
BODYLANGUAGE: 0
TOTALSCORE: 0
FACIALEXPRESSION: 0
CONSOLABILITY: 0
BREATHING: 0
FACIALEXPRESSION: 0
BREATHING: 0
BODYLANGUAGE: 0
FACIALEXPRESSION: 0
NEGVOCALIZATION: 0
FACIALEXPRESSION: 0
TOTALSCORE: 0
TOTALSCORE: 0
BODYLANGUAGE: 0
BODYLANGUAGE: 0
NEGVOCALIZATION: 0
FACIALEXPRESSION: 0
CONSOLABILITY: 0
BODYLANGUAGE: 0
NEGVOCALIZATION: 0
CONSOLABILITY: 0
TOTALSCORE: 0
BODYLANGUAGE: 0
BODYLANGUAGE: 0
FACIALEXPRESSION: 0
CONSOLABILITY: 0
BREATHING: 0
NEGVOCALIZATION: 0
NEGVOCALIZATION: 0
TOTALSCORE: 0
TOTALSCORE: 0
FACIALEXPRESSION: 0
NEGVOCALIZATION: 0
FACIALEXPRESSION: 0
CONSOLABILITY: 0
FACIALEXPRESSION: 0
BREATHING: 0
CONSOLABILITY: 0
BREATHING: 0
BREATHING: 0

## 2019-07-17 ASSESSMENT — PAIN SCALES - WONG BAKER
WONGBAKER_NUMERICALRESPONSE: 0

## 2019-07-17 NOTE — PROGRESS NOTES
Problems:    * No resolved hospital problems. *  Anemia    Plan:   Transfuse 1 unit of RBC today.     Raghu Saucedo MD  Rounding

## 2019-07-17 NOTE — PROGRESS NOTES
Bernice King am scribing for and in the presence of Claudette Tellez MD, F.A.C.C..    Patient: Agusto Keller  : 1927  Date of Admission: 7/15/2019  Primary Care Physician: Ivelsise Harrison  Today's Date: 2019    REASON FOR CONSULTATION: Acute on chronic heart failure with preserved ejection fraction. Severe pulmonary hypertension. HPI: Ms. Marsha Santos is a 80 y.o. female who was admitted to the hospital with weakness and progressive shortness of breath. Currently treated for acute on chronic diastolic CHF and iron deficiency anemia. As per her daughter. Patient is being weak and short of breath for the past few days. She was also wheezing so she thought to give her inhaler but her breathing did not improve. Daughter also reported that the patient has trouble laying on her back with cough and shortness of breath. She does not seem to be in any pain. As per daughter, no significant weight gain. No leg swelling but daughter noticed swelling of the face when she is sleeping on her side. She also noticed swelling of her hands. Her appetite is okay. With regard to her functional capacity, patient is largely wheelchair-bound. She used to stand and take few steps using her walker but now she is too weak. Today she looks sleepy and lethargic. She is being given packed RBCs. Slow infusion over 4 hours. She is hemodynamically stable. Her breathing looks comfortable. Last hemoglobin 7.3    Past Medical History:   Diagnosis Date    Arthritis     Asthma     CHF (congestive heart failure) ([address]     Diverticulitis     Female stress incontinence     Hyperlipidemia     Hypertension     Thyroid disease        CURRENT ALLERGIES: Sulfa antibiotics REVIEW OF SYSTEMS: Limited because patient does not give history.      Past Surgical History:   Procedure Laterality Date    CARPAL TUNNEL RELEASE      CATARACT REMOVAL      CHOLECYSTECTOMY      HYSTERECTOMY      Social Metoprolol succinate (Toprol XL) 75 mg  mg daily.  ACE Inibitor/ARB: Continue lisinopril 10 mg daily.  Diuretics: Continue Lasix drip.  Closely monitor kidney function and electrolytes.  Daily weight, fluid restriction and low-salt diet. · Anemia: 1 unit over 4 hours with continuous diuresis to avoid volume overload. · HgB 7.3 as of 7/17/19. · Her daughter said that she had peptic ulcer disease few years ago. · No obvious source of bleeding now. · Iron profile is suggestive of iron deficiency anemia. · We will follow-up. Once again, thank you for allowing me to participate in this patients care. Please do not hesitate to contact me if I could be of any further assistance. Sincerely,  Pauly Gaytan MD, F.A.C.C. [facility] Specialist    [address]  Phone: [phone], Fax: [phone]     I believe that the risk of significant morbidity and mortality related to the patient's current medical conditions are: Intermediate to high. The documentation recorded by the scribe, accurately and completely reflects the services I personally performed and the decisions made by me. Pauly Gaytan MD, F.A.C.C.  July 17, 2019

## 2019-07-17 NOTE — H&P
[address], [place] [address]                              HISTORY AND PHYSICAL    PATIENT NAME: Rosio Negrete                  :        1927  MED REC NO:   [id number]                              ROOM:       0320  ACCOUNT NO:   [de-identified]                           ADMIT DATE: 07/15/2019  PROVIDER:     Georgia Ortiz    CHIEF COMPLAINT:  The patient is admitted with shortness of breath and  weakness. HISTORY OF PRESENT ILLNESS:  She was brought to the office where she was  noted to be quite pale, very fatigued, exhausted, and not able to  respond much. She was hypoxic. She was short of breath, the  conjunctiva were quite pale, and she has not been able to cooperate even  to standup. PAST MEDICAL HISTORY:  Known to have chronic diastolic CHF, remote  history of anemia secondary to GI bleed, Alzheimer's type dementia, and  hypertension. SOCIAL HISTORY:  . Nonsmoker. Lives with the daughter and her  family. REVIEW OF SYSTEMS:  Not available. PHYSICAL EXAMINATION:  GENERAL:  Shows an elderly white female who looks very pale and short of  breath at rest.  Her interaction is much more limited than usual.  HEENT:  Head is normal.  NECK:  Trachea is midline. JVP engorged. CHEST:  Symmetrical, expansions are equal.  HEART:  Regular, rate of about 120 per minute. No murmurs. LUNGS:  Reveal rales in both the lungs, clearly two-thirds of the way  from the base. ABDOMEN:  Protuberant with no masses or tenderness. Bowel sounds are  normal.  EXTREMITIES:  Reveal 1+ or more dependant edema on both legs. BACK:  She has presacral edema. CNS:  Shows the patient is lethargic. Moves all extremities. IMPRESSION:  Severe anemia, congestive heart failure. PLAN:  Echocardiogram.  IV furosemide infusion. Nasal oxygen. X-rays  appropriately. Symptomatic treatment.         Rosalina WILLIAMSON:

## 2019-07-18 ENCOUNTER — APPOINTMENT (OUTPATIENT)
Dept: GENERAL RADIOLOGY | Age: 84
DRG: 291 | End: 2019-07-18
Attending: INTERNAL MEDICINE
Payer: MEDICARE

## 2019-07-18 ENCOUNTER — APPOINTMENT (OUTPATIENT)
Dept: CT IMAGING | Age: 84
DRG: 291 | End: 2019-07-18
Attending: INTERNAL MEDICINE
Payer: MEDICARE

## 2019-07-18 LAB
ABSOLUTE EOS #: 0.28 K/UL (ref 0–0.44)
ABSOLUTE IMMATURE GRANULOCYTE: 0.08 K/UL (ref 0–0.3)
ABSOLUTE LYMPH #: 1.33 K/UL (ref 1.1–3.7)
ABSOLUTE MONO #: 0.97 K/UL (ref 0.1–1.2)
ALBUMIN SERPL-MCNC: 3.5 G/DL (ref 3.5–5.2)
ALBUMIN/GLOBULIN RATIO: 1.2 (ref 1–2.5)
ALP BLD-CCNC: 78 U/L (ref 35–104)
ALT SERPL-CCNC: 11 U/L (ref 5–33)
ANION GAP SERPL CALCULATED.3IONS-SCNC: 18 MMOL/L (ref 9–17)
AST SERPL-CCNC: 17 U/L
BASOPHILS # BLD: 0 % (ref 0–2)
BASOPHILS ABSOLUTE: <0.03 K/UL (ref 0–0.2)
BILIRUB SERPL-MCNC: 0.27 MG/DL (ref 0.3–1.2)
BUN BLDV-MCNC: 23 MG/DL (ref 8–23)
BUN/CREAT BLD: 18 (ref 9–20)
CALCIUM SERPL-MCNC: 7.9 MG/DL (ref 8.6–10.4)
CHLORIDE BLD-SCNC: 96 MMOL/L (ref 98–107)
CO2: 23 MMOL/L (ref 20–31)
CREAT SERPL-MCNC: 1.27 MG/DL (ref 0.5–0.9)
DIFFERENTIAL TYPE: ABNORMAL
EOSINOPHILS RELATIVE PERCENT: 3 % (ref 1–4)
GFR AFRICAN AMERICAN: 48 ML/MIN
GFR NON-AFRICAN AMERICAN: 39 ML/MIN
GFR SERPL CREATININE-BSD FRML MDRD: ABNORMAL ML/MIN/{1.73_M2}
GFR SERPL CREATININE-BSD FRML MDRD: ABNORMAL ML/MIN/{1.73_M2}
GLUCOSE BLD-MCNC: 107 MG/DL (ref 70–99)
HCT VFR BLD CALC: 30.6 % (ref 36.3–47.1)
HEMOGLOBIN: 9.6 G/DL (ref 11.9–15.1)
IMMATURE GRANULOCYTES: 1 %
LYMPHOCYTES # BLD: 14 % (ref 24–43)
MCH RBC QN AUTO: 24.9 PG (ref 25.2–33.5)
MCHC RBC AUTO-ENTMCNC: 31.4 G/DL (ref 28.4–34.8)
MCV RBC AUTO: 79.5 FL (ref 82.6–102.9)
MONOCYTES # BLD: 10 % (ref 3–12)
NRBC AUTOMATED: 0 PER 100 WBC
PDW BLD-RTO: 17 % (ref 11.8–14.4)
PLATELET # BLD: 285 K/UL (ref 138–453)
PLATELET ESTIMATE: ABNORMAL
PMV BLD AUTO: 9.7 FL (ref 8.1–13.5)
POTASSIUM SERPL-SCNC: 3.8 MMOL/L (ref 3.7–5.3)
RBC # BLD: 3.85 M/UL (ref 3.95–5.11)
RBC # BLD: ABNORMAL 10*6/UL
SEG NEUTROPHILS: 72 % (ref 36–65)
SEGMENTED NEUTROPHILS ABSOLUTE COUNT: 6.85 K/UL (ref 1.5–8.1)
SODIUM BLD-SCNC: 137 MMOL/L (ref 135–144)
TOTAL PROTEIN: 6.4 G/DL (ref 6.4–8.3)
WBC # BLD: 9.5 K/UL (ref 3.5–11.3)
WBC # BLD: ABNORMAL 10*3/UL

## 2019-07-18 PROCEDURE — 71045 X-RAY EXAM CHEST 1 VIEW: CPT

## 2019-07-18 PROCEDURE — 97530 THERAPEUTIC ACTIVITIES: CPT

## 2019-07-18 PROCEDURE — 80053 COMPREHEN METABOLIC PANEL: CPT

## 2019-07-18 PROCEDURE — 6360000002 HC RX W HCPCS: Performed by: INTERNAL MEDICINE

## 2019-07-18 PROCEDURE — 1200000000 HC SEMI PRIVATE

## 2019-07-18 PROCEDURE — 71250 CT THORAX DX C-: CPT

## 2019-07-18 PROCEDURE — 36415 COLL VENOUS BLD VENIPUNCTURE: CPT

## 2019-07-18 PROCEDURE — 2580000003 HC RX 258: Performed by: INTERNAL MEDICINE

## 2019-07-18 PROCEDURE — 85025 COMPLETE CBC W/AUTO DIFF WBC: CPT

## 2019-07-18 PROCEDURE — 97110 THERAPEUTIC EXERCISES: CPT

## 2019-07-18 PROCEDURE — 6370000000 HC RX 637 (ALT 250 FOR IP): Performed by: INTERNAL MEDICINE

## 2019-07-18 RX ADMIN — LISINOPRIL 10 MG: 10 TABLET ORAL at 21:58

## 2019-07-18 RX ADMIN — METOPROLOL SUCCINATE 75 MG: 50 TABLET, FILM COATED, EXTENDED RELEASE ORAL at 09:45

## 2019-07-18 RX ADMIN — FUROSEMIDE 5 MG/HR: 10 INJECTION, SOLUTION INTRAMUSCULAR; INTRAVENOUS at 03:12

## 2019-07-18 RX ADMIN — ENOXAPARIN SODIUM 30 MG: 30 INJECTION SUBCUTANEOUS at 09:48

## 2019-07-18 RX ADMIN — PANTOPRAZOLE SODIUM 40 MG: 40 TABLET, DELAYED RELEASE ORAL at 09:43

## 2019-07-18 RX ADMIN — LISINOPRIL 10 MG: 10 TABLET ORAL at 09:45

## 2019-07-18 RX ADMIN — Medication 10 ML: at 09:46

## 2019-07-18 RX ADMIN — MONTELUKAST SODIUM 10 MG: 10 TABLET, FILM COATED ORAL at 21:58

## 2019-07-18 RX ADMIN — CYANOCOBALAMIN TAB 1000 MCG 1000 MCG: 1000 TAB at 21:58

## 2019-07-18 RX ADMIN — LEVOTHYROXINE SODIUM 75 MCG: 75 TABLET ORAL at 09:44

## 2019-07-18 ASSESSMENT — PAIN SCALES - PAIN ASSESSMENT IN ADVANCED DEMENTIA (PAINAD)
NEGVOCALIZATION: 0
BREATHING: 0
CONSOLABILITY: 0
BREATHING: 0
FACIALEXPRESSION: 0
FACIALEXPRESSION: 0
CONSOLABILITY: 0
NEGVOCALIZATION: 0
NEGVOCALIZATION: 0
BREATHING: 0
FACIALEXPRESSION: 0
TOTALSCORE: 0
CONSOLABILITY: 0
FACIALEXPRESSION: 0
TOTALSCORE: 0
BREATHING: 0
BODYLANGUAGE: 0
BODYLANGUAGE: 0
FACIALEXPRESSION: 0
BREATHING: 0
BREATHING: 0
TOTALSCORE: 0
BREATHING: 0
CONSOLABILITY: 0
BODYLANGUAGE: 0
BODYLANGUAGE: 0
NEGVOCALIZATION: 0
CONSOLABILITY: 0
FACIALEXPRESSION: 0
CONSOLABILITY: 0
NEGVOCALIZATION: 0
CONSOLABILITY: 0
TOTALSCORE: 0
BODYLANGUAGE: 0
CONSOLABILITY: 0
BODYLANGUAGE: 0
FACIALEXPRESSION: 0
TOTALSCORE: 0
TOTALSCORE: 0
NEGVOCALIZATION: 0
BODYLANGUAGE: 0
TOTALSCORE: 0
FACIALEXPRESSION: 0
NEGVOCALIZATION: 0
BREATHING: 0
NEGVOCALIZATION: 0
CONSOLABILITY: 0
TOTALSCORE: 0
BREATHING: 0
BODYLANGUAGE: 0
FACIALEXPRESSION: 0
TOTALSCORE: 0
BODYLANGUAGE: 0
FACIALEXPRESSION: 0
BODYLANGUAGE: 0
NEGVOCALIZATION: 0
TOTALSCORE: 0
BREATHING: 0
CONSOLABILITY: 0
NEGVOCALIZATION: 0

## 2019-07-18 NOTE — PROGRESS NOTES
Progress Note  7/18/2019 9:13 AM  Subjective:   Admit Date: 7/15/2019  PCP: Adarsh Robles MD    Interval History: Feels about the same as yesterday. Slept fair. Denies fever,chills and rigors. Shortness of breath is slightly better. Diet: DIET LOW SODIUM 2 GM; Medications:   Scheduled Meds:   lisinopril  10 mg Oral 2 times per day    levothyroxine  75 mcg Oral Daily    pantoprazole  40 mg Oral QAM AC    vitamin B-12  1,000 mcg Oral Nightly    metoprolol succinate  75 mg Oral Daily    montelukast  10 mg Oral Nightly    sodium chloride flush  10 mL Intravenous 2 times per day    enoxaparin  30 mg Subcutaneous Daily     Continuous Infusions:   sodium chloride 5 mL/hr at 07/16/19 1100    furosemide (LASIX) 1mg/ml infusion 5 mg/hr (07/18/19 0312)           Objective:   Vitals: /69   Pulse 88   Temp 99.1 °F (37.3 °C) (Temporal)   Resp 16   Ht 4' 9\" (1.448 m)   Wt 106 lb 11.2 oz (48.4 kg)   SpO2 93%   BMI 23.09 kg/m²   General appearance: Color is more pink today. Neck: no adenopathy, no carotid bruit, no JVD, supple, symmetrical, trachea midline and thyroid not enlarged, symmetric, no tenderness/mass/nodules  Lungs: diminished breath sounds bilaterally and rales bibasilar  Heart: regular rate and rhythm, S1, S2 normal, no murmur, click, rub or gallop  Extremities: extremities normal, atraumatic, no cyanosis or edema  Neurologic: Mental status: Alert and less interactive.           CBC:   Recent Labs     07/16/19  0528 07/17/19  0545 07/18/19  0540   WBC 9.3 10.2 9.5   HGB 7.0* 7.3* 9.6*    324 285     BMP:    Recent Labs     07/16/19  0528 07/17/19  0545 07/18/19  0540    135 137   K 3.0* 3.3* 3.8    100 96*   CO2 20 21 23   BUN 12 17 23   CREATININE 0.87 1.13* 1.27*   GLUCOSE 123* 116* 107*     Hepatic:   Recent Labs     07/18/19  0540   AST 17   ALT 11   BILITOT 0.27*   ALKPHOS 78           Assessment:       Active Problems:    Acute on chronic diastolic congestive heart failure ([facility] [address]    Essential hypertension    Late onset Alzheimer disease  Resolved Problems:    * No resolved hospital problems.  *      Plan:   X ray chest   U/A and reflex C/S    Michelle Berrios MD  Rounding

## 2019-07-18 NOTE — PROGRESS NOTES
Normal Weight  Recent Labs     07/16/19  0528 07/17/19  0545 07/18/19  0540    135 137   K 3.0* 3.3* 3.8    100 96*   CO2 20 21 23   BUN 12 17 23   CREATININE 0.87 1.13* 1.27*   GLUCOSE 123* 116* 107*   ALT  --   --  11   ALKPHOS  --   --  78   GFR                                       Lab Results   Component Value Date    LABALBU 3.5 07/18/2019      Hemoglobin/Hematocrit:    Lab Results   Component Value Date    HGB 9.6 07/18/2019    HCT 30.6 07/18/2019     Nutrition Interventions:   Continue current diet  Continued Inpatient Monitoring, Education not appropriate at this time, Coordination of Care    Nutrition Evaluation:   · Evaluation: Progressing toward goals   · Goals: PO > 75% of meals    · Monitoring: Meal Intake, I&O, Weight, Pertinent Labs, Patient/Family Education      Electronically signed by Halie Villanueva RD, LD on 7/18/19 at 11:33 AM    Contact Number: [phone]

## 2019-07-19 LAB
-: ABNORMAL
ABSOLUTE EOS #: 0.28 K/UL (ref 0–0.44)
ABSOLUTE IMMATURE GRANULOCYTE: 0.11 K/UL (ref 0–0.3)
ABSOLUTE LYMPH #: 1.45 K/UL (ref 1.1–3.7)
ABSOLUTE MONO #: 1.25 K/UL (ref 0.1–1.2)
AMORPHOUS: ABNORMAL
ANION GAP SERPL CALCULATED.3IONS-SCNC: 16 MMOL/L (ref 9–17)
BACTERIA: ABNORMAL
BASOPHILS # BLD: 0 % (ref 0–2)
BASOPHILS ABSOLUTE: 0.03 K/UL (ref 0–0.2)
BILIRUBIN URINE: NEGATIVE
BUN BLDV-MCNC: 29 MG/DL (ref 8–23)
BUN/CREAT BLD: 23 (ref 9–20)
CALCIUM SERPL-MCNC: 8.3 MG/DL (ref 8.6–10.4)
CASTS UA: ABNORMAL /LPF
CHLORIDE BLD-SCNC: 93 MMOL/L (ref 98–107)
CO2: 23 MMOL/L (ref 20–31)
COLOR: YELLOW
COMMENT UA: ABNORMAL
CREAT SERPL-MCNC: 1.24 MG/DL (ref 0.5–0.9)
CRYSTALS, UA: ABNORMAL /HPF
DIFFERENTIAL TYPE: ABNORMAL
EOSINOPHILS RELATIVE PERCENT: 4 % (ref 1–4)
EPITHELIAL CELLS UA: ABNORMAL /HPF (ref 0–25)
GFR AFRICAN AMERICAN: 49 ML/MIN
GFR NON-AFRICAN AMERICAN: 41 ML/MIN
GFR SERPL CREATININE-BSD FRML MDRD: ABNORMAL ML/MIN/{1.73_M2}
GFR SERPL CREATININE-BSD FRML MDRD: ABNORMAL ML/MIN/{1.73_M2}
GLUCOSE BLD-MCNC: 104 MG/DL (ref 70–99)
GLUCOSE URINE: NEGATIVE
HCT VFR BLD CALC: 33.2 % (ref 36.3–47.1)
HEMOGLOBIN: 10.4 G/DL (ref 11.9–15.1)
IMMATURE GRANULOCYTES: 1 %
KETONES, URINE: NEGATIVE
LEUKOCYTE ESTERASE, URINE: ABNORMAL
LYMPHOCYTES # BLD: 18 % (ref 24–43)
MCH RBC QN AUTO: 25.1 PG (ref 25.2–33.5)
MCHC RBC AUTO-ENTMCNC: 31.3 G/DL (ref 28.4–34.8)
MCV RBC AUTO: 80 FL (ref 82.6–102.9)
MONOCYTES # BLD: 16 % (ref 3–12)
MUCUS: ABNORMAL
NITRITE, URINE: NEGATIVE
NRBC AUTOMATED: 0 PER 100 WBC
OTHER OBSERVATIONS UA: ABNORMAL
PDW BLD-RTO: 17.5 % (ref 11.8–14.4)
PH UA: 6.5 (ref 5–9)
PLATELET # BLD: 328 K/UL (ref 138–453)
PLATELET ESTIMATE: ABNORMAL
PMV BLD AUTO: 9.6 FL (ref 8.1–13.5)
POTASSIUM SERPL-SCNC: 3.2 MMOL/L (ref 3.7–5.3)
PROTEIN UA: ABNORMAL
RBC # BLD: 4.15 M/UL (ref 3.95–5.11)
RBC # BLD: ABNORMAL 10*6/UL
RBC UA: ABNORMAL /HPF (ref 0–2)
RENAL EPITHELIAL, UA: ABNORMAL /HPF
SEG NEUTROPHILS: 61 % (ref 36–65)
SEGMENTED NEUTROPHILS ABSOLUTE COUNT: 4.93 K/UL (ref 1.5–8.1)
SODIUM BLD-SCNC: 132 MMOL/L (ref 135–144)
SPECIFIC GRAVITY UA: 1.01 (ref 1.01–1.02)
TRICHOMONAS: ABNORMAL
TURBIDITY: CLEAR
URINE HGB: ABNORMAL
UROBILINOGEN, URINE: NORMAL
WBC # BLD: 8.1 K/UL (ref 3.5–11.3)
WBC # BLD: ABNORMAL 10*3/UL
WBC UA: ABNORMAL /HPF (ref 0–5)
YEAST: ABNORMAL

## 2019-07-19 PROCEDURE — 80048 BASIC METABOLIC PNL TOTAL CA: CPT

## 2019-07-19 PROCEDURE — 1200000000 HC SEMI PRIVATE

## 2019-07-19 PROCEDURE — 87086 URINE CULTURE/COLONY COUNT: CPT

## 2019-07-19 PROCEDURE — 97530 THERAPEUTIC ACTIVITIES: CPT

## 2019-07-19 PROCEDURE — 2580000003 HC RX 258: Performed by: INTERNAL MEDICINE

## 2019-07-19 PROCEDURE — 97110 THERAPEUTIC EXERCISES: CPT

## 2019-07-19 PROCEDURE — 87088 URINE BACTERIA CULTURE: CPT

## 2019-07-19 PROCEDURE — 81001 URINALYSIS AUTO W/SCOPE: CPT

## 2019-07-19 PROCEDURE — 6360000002 HC RX W HCPCS: Performed by: INTERNAL MEDICINE

## 2019-07-19 PROCEDURE — 87186 SC STD MICRODIL/AGAR DIL: CPT

## 2019-07-19 PROCEDURE — 6370000000 HC RX 637 (ALT 250 FOR IP): Performed by: INTERNAL MEDICINE

## 2019-07-19 PROCEDURE — 85025 COMPLETE CBC W/AUTO DIFF WBC: CPT

## 2019-07-19 PROCEDURE — 36415 COLL VENOUS BLD VENIPUNCTURE: CPT

## 2019-07-19 RX ORDER — METOPROLOL SUCCINATE 100 MG/1
100 TABLET, EXTENDED RELEASE ORAL DAILY
Status: DISCONTINUED | OUTPATIENT
Start: 2019-07-20 | End: 2019-07-21 | Stop reason: HOSPADM

## 2019-07-19 RX ORDER — HYDROCHLOROTHIAZIDE 25 MG/1
25 TABLET ORAL DAILY
Status: DISCONTINUED | OUTPATIENT
Start: 2019-07-19 | End: 2019-07-20

## 2019-07-19 RX ORDER — SPIRONOLACTONE 25 MG/1
25 TABLET ORAL DAILY
Status: DISCONTINUED | OUTPATIENT
Start: 2019-07-19 | End: 2019-07-20

## 2019-07-19 RX ORDER — SPIRONOLACTONE AND HYDROCHLOROTHIAZIDE 25; 25 MG/1; MG/1
1 TABLET ORAL DAILY
Status: DISCONTINUED | OUTPATIENT
Start: 2019-07-19 | End: 2019-07-19 | Stop reason: SDUPTHER

## 2019-07-19 RX ADMIN — HYDROCHLOROTHIAZIDE 25 MG: 25 TABLET ORAL at 17:40

## 2019-07-19 RX ADMIN — LEVOTHYROXINE SODIUM 75 MCG: 75 TABLET ORAL at 06:55

## 2019-07-19 RX ADMIN — PANTOPRAZOLE SODIUM 40 MG: 40 TABLET, DELAYED RELEASE ORAL at 06:55

## 2019-07-19 RX ADMIN — CYANOCOBALAMIN TAB 1000 MCG 1000 MCG: 1000 TAB at 21:16

## 2019-07-19 RX ADMIN — ENOXAPARIN SODIUM 30 MG: 30 INJECTION SUBCUTANEOUS at 09:22

## 2019-07-19 RX ADMIN — LISINOPRIL 10 MG: 10 TABLET ORAL at 21:16

## 2019-07-19 RX ADMIN — Medication 10 ML: at 09:22

## 2019-07-19 RX ADMIN — FUROSEMIDE 5 MG/HR: 10 INJECTION, SOLUTION INTRAMUSCULAR; INTRAVENOUS at 00:01

## 2019-07-19 RX ADMIN — SPIRONOLACTONE 25 MG: 25 TABLET ORAL at 17:40

## 2019-07-19 RX ADMIN — MONTELUKAST SODIUM 10 MG: 10 TABLET, FILM COATED ORAL at 21:16

## 2019-07-19 RX ADMIN — METOPROLOL SUCCINATE 75 MG: 50 TABLET, FILM COATED, EXTENDED RELEASE ORAL at 09:22

## 2019-07-19 RX ADMIN — Medication 10 ML: at 21:16

## 2019-07-19 RX ADMIN — LISINOPRIL 10 MG: 10 TABLET ORAL at 09:22

## 2019-07-19 ASSESSMENT — PAIN SCALES - PAIN ASSESSMENT IN ADVANCED DEMENTIA (PAINAD)
BREATHING: 0
BODYLANGUAGE: 0
BODYLANGUAGE: 0
TOTALSCORE: 0
FACIALEXPRESSION: 0
BODYLANGUAGE: 0
NEGVOCALIZATION: 0
NEGVOCALIZATION: 0
TOTALSCORE: 0
BREATHING: 0
BREATHING: 0
BODYLANGUAGE: 0
BREATHING: 0
CONSOLABILITY: 0
BODYLANGUAGE: 0
FACIALEXPRESSION: 0
NEGVOCALIZATION: 0
BODYLANGUAGE: 0
TOTALSCORE: 0
FACIALEXPRESSION: 0
BREATHING: 0
CONSOLABILITY: 0
NEGVOCALIZATION: 0
TOTALSCORE: 0
CONSOLABILITY: 0
FACIALEXPRESSION: 0
BODYLANGUAGE: 0
TOTALSCORE: 0
BODYLANGUAGE: 0
FACIALEXPRESSION: 0
FACIALEXPRESSION: 0
TOTALSCORE: 0
TOTALSCORE: 0
FACIALEXPRESSION: 0
BODYLANGUAGE: 0
NEGVOCALIZATION: 0
TOTALSCORE: 0
FACIALEXPRESSION: 0
BREATHING: 0
CONSOLABILITY: 0
BODYLANGUAGE: 0
FACIALEXPRESSION: 0
BODYLANGUAGE: 0
BREATHING: 0
CONSOLABILITY: 0
CONSOLABILITY: 0
NEGVOCALIZATION: 0
TOTALSCORE: 0
TOTALSCORE: 0
CONSOLABILITY: 0
FACIALEXPRESSION: 0
NEGVOCALIZATION: 0
NEGVOCALIZATION: 0
BREATHING: 0
BREATHING: 0
BODYLANGUAGE: 0
BREATHING: 0
BODYLANGUAGE: 0
NEGVOCALIZATION: 0
TOTALSCORE: 0
NEGVOCALIZATION: 0
CONSOLABILITY: 0
BODYLANGUAGE: 0
TOTALSCORE: 0
FACIALEXPRESSION: 0
CONSOLABILITY: 0
FACIALEXPRESSION: 0
TOTALSCORE: 0
BODYLANGUAGE: 0
BREATHING: 0
CONSOLABILITY: 0
CONSOLABILITY: 0
NEGVOCALIZATION: 0
TOTALSCORE: 0
NEGVOCALIZATION: 0
TOTALSCORE: 0
TOTALSCORE: 0
FACIALEXPRESSION: 0
TOTALSCORE: 0
FACIALEXPRESSION: 0
CONSOLABILITY: 0
BREATHING: 0
CONSOLABILITY: 0
CONSOLABILITY: 0
NEGVOCALIZATION: 0
NEGVOCALIZATION: 0
CONSOLABILITY: 0
FACIALEXPRESSION: 0
BODYLANGUAGE: 0
BREATHING: 0
CONSOLABILITY: 0
FACIALEXPRESSION: 0
FACIALEXPRESSION: 0
CONSOLABILITY: 0
BREATHING: 0
NEGVOCALIZATION: 0
NEGVOCALIZATION: 0
BODYLANGUAGE: 0
BREATHING: 0
BREATHING: 0
NEGVOCALIZATION: 0
BREATHING: 0

## 2019-07-19 ASSESSMENT — PAIN SCALES - WONG BAKER: WONGBAKER_NUMERICALRESPONSE: 0

## 2019-07-19 NOTE — PROGRESS NOTES
[facility]  OCCUPATIONAL THERAPY  No Visit Note    [] ICU    [x] Acute   Patient: Olivier Keith  Room: [facility]/9602-46      Olivier Keith not seen on 7/19/2019 at 1:44 PM due to patient refusal.  Therapist unable to arouse patient. Attempted PROM however pt resistive to touch.           Signature: KENNEDY Hargrove

## 2019-07-19 NOTE — PLAN OF CARE
Problem: Falls - Risk of:  Goal: Will remain free from falls  Description  Will remain free from falls  7/19/2019 0037 by Castillo Sarkar RN  Outcome: Ongoing  Note:   Patient is a high fall risk. Bed alarm on, bed wheels locked and kept in lowest position. Nonskid wear on, fall sign posted and fall sticker on. Bedside table and call light within reach. Needs assessed with hourly rounding and environment scanned for any safety issue. Problem: Risk for Impaired Skin Integrity  Goal: Tissue integrity - skin and mucous membranes  Description  Structural intactness and normal physiological function of skin and  mucous membranes. 7/19/2019 0037 by Castillo Sarkar RN  Outcome: Ongoing  Note:   Lázaro score completed for the shift. Patient is repositioned with pillow to her sides alternatively. Skin assessed through out the body, no any skin issues noted as of now. Will continue to monitor. Problem: OXYGENATION/RESPIRATORY FUNCTION  Goal: Patient will maintain patent airway  7/19/2019 0037 by Castillo Sarkar RN  Outcome: Ongoing  Note:   Patient has been maintaining a patent airway. Breathing is noted to be easy and unlabored. Will continue to monitor. Problem: HEMODYNAMIC STATUS  Goal: Patient has stable vital signs and fluid balance  7/19/2019 0037 by Castillo Sarkar RN  Outcome: Ongoing  Note:   Patient's vital has been within normal parameter. Patient has a hunter in place to monitor the output. Problem: FLUID AND ELECTROLYTE IMBALANCE  Goal: Fluid and electrolyte balance are achieved/maintained  7/19/2019 0037 by Castillo Sarkar RN  Outcome: Ongoing  Note:   Electrolytes are monitored with morning lab draws. Will continue to monitor the lab values.

## 2019-07-20 LAB
ABSOLUTE EOS #: 0.21 K/UL (ref 0–0.44)
ABSOLUTE IMMATURE GRANULOCYTE: 0.1 K/UL (ref 0–0.3)
ABSOLUTE LYMPH #: 2.21 K/UL (ref 1.1–3.7)
ABSOLUTE MONO #: 1.09 K/UL (ref 0.1–1.2)
ANION GAP SERPL CALCULATED.3IONS-SCNC: 17 MMOL/L (ref 9–17)
BASOPHILS # BLD: 0 % (ref 0–2)
BASOPHILS ABSOLUTE: <0.03 K/UL (ref 0–0.2)
BUN BLDV-MCNC: 45 MG/DL (ref 8–23)
BUN/CREAT BLD: 28 (ref 9–20)
C DIFF AG + TOXIN: NEGATIVE
CALCIUM SERPL-MCNC: 7.9 MG/DL (ref 8.6–10.4)
CHLORIDE BLD-SCNC: 93 MMOL/L (ref 98–107)
CO2: 22 MMOL/L (ref 20–31)
CREAT SERPL-MCNC: 1.62 MG/DL (ref 0.5–0.9)
DIFFERENTIAL TYPE: ABNORMAL
EOSINOPHILS RELATIVE PERCENT: 2 % (ref 1–4)
GFR AFRICAN AMERICAN: 36 ML/MIN
GFR NON-AFRICAN AMERICAN: 30 ML/MIN
GFR SERPL CREATININE-BSD FRML MDRD: ABNORMAL ML/MIN/{1.73_M2}
GFR SERPL CREATININE-BSD FRML MDRD: ABNORMAL ML/MIN/{1.73_M2}
GLUCOSE BLD-MCNC: 109 MG/DL (ref 70–99)
HCT VFR BLD CALC: 34.1 % (ref 36.3–47.1)
HEMOGLOBIN: 10.2 G/DL (ref 11.9–15.1)
IMMATURE GRANULOCYTES: 1 %
LYMPHOCYTES # BLD: 21 % (ref 24–43)
MCH RBC QN AUTO: 24.4 PG (ref 25.2–33.5)
MCHC RBC AUTO-ENTMCNC: 29.9 G/DL (ref 28.4–34.8)
MCV RBC AUTO: 81.6 FL (ref 82.6–102.9)
MONOCYTES # BLD: 11 % (ref 3–12)
NRBC AUTOMATED: 0 PER 100 WBC
PDW BLD-RTO: 17.9 % (ref 11.8–14.4)
PLATELET # BLD: 377 K/UL (ref 138–453)
PLATELET ESTIMATE: ABNORMAL
PMV BLD AUTO: 9.6 FL (ref 8.1–13.5)
POTASSIUM SERPL-SCNC: 3.2 MMOL/L (ref 3.7–5.3)
RBC # BLD: 4.18 M/UL (ref 3.95–5.11)
RBC # BLD: ABNORMAL 10*6/UL
SEG NEUTROPHILS: 65 % (ref 36–65)
SEGMENTED NEUTROPHILS ABSOLUTE COUNT: 6.78 K/UL (ref 1.5–8.1)
SODIUM BLD-SCNC: 132 MMOL/L (ref 135–144)
SPECIMEN DESCRIPTION: NORMAL
WBC # BLD: 10.4 K/UL (ref 3.5–11.3)
WBC # BLD: ABNORMAL 10*3/UL

## 2019-07-20 PROCEDURE — 87449 NOS EACH ORGANISM AG IA: CPT

## 2019-07-20 PROCEDURE — 87324 CLOSTRIDIUM AG IA: CPT

## 2019-07-20 PROCEDURE — 97530 THERAPEUTIC ACTIVITIES: CPT

## 2019-07-20 PROCEDURE — 80048 BASIC METABOLIC PNL TOTAL CA: CPT

## 2019-07-20 PROCEDURE — 2580000003 HC RX 258: Performed by: INTERNAL MEDICINE

## 2019-07-20 PROCEDURE — 36415 COLL VENOUS BLD VENIPUNCTURE: CPT

## 2019-07-20 PROCEDURE — 6370000000 HC RX 637 (ALT 250 FOR IP): Performed by: INTERNAL MEDICINE

## 2019-07-20 PROCEDURE — 85025 COMPLETE CBC W/AUTO DIFF WBC: CPT

## 2019-07-20 PROCEDURE — 6360000002 HC RX W HCPCS: Performed by: INTERNAL MEDICINE

## 2019-07-20 PROCEDURE — 1200000000 HC SEMI PRIVATE

## 2019-07-20 RX ORDER — HYDROCHLOROTHIAZIDE 12.5 MG/1
12.5 CAPSULE, GELATIN COATED ORAL DAILY
Status: DISCONTINUED | OUTPATIENT
Start: 2019-07-21 | End: 2019-07-21 | Stop reason: HOSPADM

## 2019-07-20 RX ORDER — SPIRONOLACTONE 25 MG/1
12.5 TABLET ORAL DAILY
Status: DISCONTINUED | OUTPATIENT
Start: 2019-07-21 | End: 2019-07-21 | Stop reason: HOSPADM

## 2019-07-20 RX ADMIN — CYANOCOBALAMIN TAB 1000 MCG 1000 MCG: 1000 TAB at 21:01

## 2019-07-20 RX ADMIN — SPIRONOLACTONE 25 MG: 25 TABLET ORAL at 09:31

## 2019-07-20 RX ADMIN — HYDROCHLOROTHIAZIDE 25 MG: 25 TABLET ORAL at 09:31

## 2019-07-20 RX ADMIN — MONTELUKAST SODIUM 10 MG: 10 TABLET, FILM COATED ORAL at 21:01

## 2019-07-20 RX ADMIN — Medication 10 ML: at 09:32

## 2019-07-20 RX ADMIN — METOPROLOL SUCCINATE 100 MG: 100 TABLET, EXTENDED RELEASE ORAL at 09:31

## 2019-07-20 RX ADMIN — LEVOTHYROXINE SODIUM 75 MCG: 75 TABLET ORAL at 09:32

## 2019-07-20 RX ADMIN — ENOXAPARIN SODIUM 30 MG: 30 INJECTION SUBCUTANEOUS at 09:32

## 2019-07-20 RX ADMIN — Medication 10 ML: at 21:02

## 2019-07-20 RX ADMIN — PANTOPRAZOLE SODIUM 40 MG: 40 TABLET, DELAYED RELEASE ORAL at 09:31

## 2019-07-20 RX ADMIN — LISINOPRIL 10 MG: 10 TABLET ORAL at 21:01

## 2019-07-20 RX ADMIN — LISINOPRIL 10 MG: 10 TABLET ORAL at 09:31

## 2019-07-20 ASSESSMENT — PAIN SCALES - PAIN ASSESSMENT IN ADVANCED DEMENTIA (PAINAD)
BODYLANGUAGE: 0
BREATHING: 0
FACIALEXPRESSION: 0
CONSOLABILITY: 0
NEGVOCALIZATION: 0
FACIALEXPRESSION: 0
NEGVOCALIZATION: 0
NEGVOCALIZATION: 0
BREATHING: 0
FACIALEXPRESSION: 0
BODYLANGUAGE: 0
NEGVOCALIZATION: 0
BODYLANGUAGE: 0
BREATHING: 0
FACIALEXPRESSION: 0
NEGVOCALIZATION: 0
CONSOLABILITY: 0
NEGVOCALIZATION: 0
TOTALSCORE: 0
FACIALEXPRESSION: 0
TOTALSCORE: 0
BODYLANGUAGE: 0
BODYLANGUAGE: 0
FACIALEXPRESSION: 0
NEGVOCALIZATION: 0
BREATHING: 0
CONSOLABILITY: 0
BREATHING: 0
BREATHING: 0
TOTALSCORE: 0
TOTALSCORE: 0
FACIALEXPRESSION: 0
FACIALEXPRESSION: 0
BODYLANGUAGE: 0
CONSOLABILITY: 0
BREATHING: 0
BODYLANGUAGE: 0
BREATHING: 0
NEGVOCALIZATION: 0
CONSOLABILITY: 0
TOTALSCORE: 0
TOTALSCORE: 0
BREATHING: 0
FACIALEXPRESSION: 0
NEGVOCALIZATION: 0
CONSOLABILITY: 0
BODYLANGUAGE: 0
CONSOLABILITY: 0
CONSOLABILITY: 0
TOTALSCORE: 0
TOTALSCORE: 0
FACIALEXPRESSION: 0
BODYLANGUAGE: 0
NEGVOCALIZATION: 0
TOTALSCORE: 0
NEGVOCALIZATION: 0
FACIALEXPRESSION: 0
BREATHING: 0
FACIALEXPRESSION: 0
NEGVOCALIZATION: 0
BODYLANGUAGE: 0
BODYLANGUAGE: 0
TOTALSCORE: 0
NEGVOCALIZATION: 0
BODYLANGUAGE: 0
TOTALSCORE: 0
CONSOLABILITY: 0
BODYLANGUAGE: 0
CONSOLABILITY: 0
FACIALEXPRESSION: 0
FACIALEXPRESSION: 0
CONSOLABILITY: 0
NEGVOCALIZATION: 0
NEGVOCALIZATION: 0
CONSOLABILITY: 0
BREATHING: 0
CONSOLABILITY: 0
TOTALSCORE: 0
BREATHING: 0
BREATHING: 0
TOTALSCORE: 0
CONSOLABILITY: 0
BODYLANGUAGE: 0
TOTALSCORE: 0
FACIALEXPRESSION: 0
NEGVOCALIZATION: 0
TOTALSCORE: 0
FACIALEXPRESSION: 0
TOTALSCORE: 0
BREATHING: 0
BODYLANGUAGE: 0
BREATHING: 0
BODYLANGUAGE: 0
BREATHING: 0
NEGVOCALIZATION: 0
FACIALEXPRESSION: 0
TOTALSCORE: 0
NEGVOCALIZATION: 0
BREATHING: 0
BREATHING: 0
BODYLANGUAGE: 0
FACIALEXPRESSION: 0
CONSOLABILITY: 0
NEGVOCALIZATION: 0
BODYLANGUAGE: 0
BODYLANGUAGE: 0
BREATHING: 0
CONSOLABILITY: 0
NEGVOCALIZATION: 0
CONSOLABILITY: 0
FACIALEXPRESSION: 0
BODYLANGUAGE: 0
BODYLANGUAGE: 0
TOTALSCORE: 0
BODYLANGUAGE: 0
CONSOLABILITY: 0
NEGVOCALIZATION: 0
BODYLANGUAGE: 0
CONSOLABILITY: 0
NEGVOCALIZATION: 0
BREATHING: 0
TOTALSCORE: 0
BREATHING: 0
CONSOLABILITY: 0
CONSOLABILITY: 0
NEGVOCALIZATION: 0
FACIALEXPRESSION: 0
FACIALEXPRESSION: 0

## 2019-07-20 ASSESSMENT — PAIN SCALES - WONG BAKER

## 2019-07-21 VITALS
OXYGEN SATURATION: 96 % | DIASTOLIC BLOOD PRESSURE: 53 MMHG | TEMPERATURE: 99.2 F | HEIGHT: 57 IN | SYSTOLIC BLOOD PRESSURE: 107 MMHG | BODY MASS INDEX: 23.6 KG/M2 | RESPIRATION RATE: 20 BRPM | HEART RATE: 74 BPM | WEIGHT: 109.4 LBS

## 2019-07-21 PROBLEM — N30.00 ACUTE CYSTITIS WITHOUT HEMATURIA: Status: ACTIVE | Noted: 2019-07-21

## 2019-07-21 PROBLEM — D50.9 IRON DEFICIENCY ANEMIA: Chronic | Status: ACTIVE | Noted: 2019-07-21

## 2019-07-21 PROBLEM — J90 PLEURAL EFFUSION: Status: ACTIVE | Noted: 2019-07-21

## 2019-07-21 PROBLEM — N18.30 STAGE 3 CHRONIC KIDNEY DISEASE (HCC): Chronic | Status: ACTIVE | Noted: 2019-07-21

## 2019-07-21 LAB
ABO/RH: NORMAL
ABSOLUTE EOS #: 0.42 K/UL (ref 0–0.44)
ABSOLUTE IMMATURE GRANULOCYTE: 0.08 K/UL (ref 0–0.3)
ABSOLUTE LYMPH #: 2.52 K/UL (ref 1.1–3.7)
ABSOLUTE MONO #: 0.73 K/UL (ref 0.1–1.2)
ANION GAP SERPL CALCULATED.3IONS-SCNC: 15 MMOL/L (ref 9–17)
ANTIBODY SCREEN: NEGATIVE
ARM BAND NUMBER: NORMAL
BASOPHILS # BLD: 0 % (ref 0–2)
BASOPHILS ABSOLUTE: <0.03 K/UL (ref 0–0.2)
BLD PROD TYP BPU: NORMAL
BUN BLDV-MCNC: 46 MG/DL (ref 8–23)
BUN/CREAT BLD: 29 (ref 9–20)
CALCIUM SERPL-MCNC: 7.9 MG/DL (ref 8.6–10.4)
CHLORIDE BLD-SCNC: 95 MMOL/L (ref 98–107)
CO2: 22 MMOL/L (ref 20–31)
CREAT SERPL-MCNC: 1.59 MG/DL (ref 0.5–0.9)
CROSSMATCH RESULT: NORMAL
CULTURE: ABNORMAL
CULTURE: ABNORMAL
DIFFERENTIAL TYPE: ABNORMAL
DISPENSE STATUS BLOOD BANK: NORMAL
EOSINOPHILS RELATIVE PERCENT: 5 % (ref 1–4)
EXPIRATION DATE: NORMAL
GFR AFRICAN AMERICAN: 37 ML/MIN
GFR NON-AFRICAN AMERICAN: 30 ML/MIN
GFR SERPL CREATININE-BSD FRML MDRD: ABNORMAL ML/MIN/{1.73_M2}
GFR SERPL CREATININE-BSD FRML MDRD: ABNORMAL ML/MIN/{1.73_M2}
GLUCOSE BLD-MCNC: 94 MG/DL (ref 70–99)
HCT VFR BLD CALC: 31.4 % (ref 36.3–47.1)
HEMOGLOBIN: 9.7 G/DL (ref 11.9–15.1)
IMMATURE GRANULOCYTES: 1 %
LYMPHOCYTES # BLD: 30 % (ref 24–43)
Lab: ABNORMAL
MCH RBC QN AUTO: 24.9 PG (ref 25.2–33.5)
MCHC RBC AUTO-ENTMCNC: 30.9 G/DL (ref 28.4–34.8)
MCV RBC AUTO: 80.5 FL (ref 82.6–102.9)
MONOCYTES # BLD: 9 % (ref 3–12)
NRBC AUTOMATED: 0 PER 100 WBC
PDW BLD-RTO: 18.3 % (ref 11.8–14.4)
PLATELET # BLD: 383 K/UL (ref 138–453)
PLATELET ESTIMATE: ABNORMAL
PMV BLD AUTO: 9.6 FL (ref 8.1–13.5)
POTASSIUM SERPL-SCNC: 3.8 MMOL/L (ref 3.7–5.3)
RBC # BLD: 3.9 M/UL (ref 3.95–5.11)
RBC # BLD: ABNORMAL 10*6/UL
SEG NEUTROPHILS: 55 % (ref 36–65)
SEGMENTED NEUTROPHILS ABSOLUTE COUNT: 4.77 K/UL (ref 1.5–8.1)
SODIUM BLD-SCNC: 132 MMOL/L (ref 135–144)
SPECIMEN DESCRIPTION: ABNORMAL
TRANSFUSION STATUS: NORMAL
UNIT DIVISION: 0
UNIT NUMBER: NORMAL
WBC # BLD: 8.5 K/UL (ref 3.5–11.3)
WBC # BLD: ABNORMAL 10*3/UL

## 2019-07-21 PROCEDURE — 85025 COMPLETE CBC W/AUTO DIFF WBC: CPT

## 2019-07-21 PROCEDURE — 80048 BASIC METABOLIC PNL TOTAL CA: CPT

## 2019-07-21 PROCEDURE — 97530 THERAPEUTIC ACTIVITIES: CPT

## 2019-07-21 PROCEDURE — 97110 THERAPEUTIC EXERCISES: CPT

## 2019-07-21 PROCEDURE — 6360000002 HC RX W HCPCS: Performed by: INTERNAL MEDICINE

## 2019-07-21 PROCEDURE — 2580000003 HC RX 258: Performed by: INTERNAL MEDICINE

## 2019-07-21 PROCEDURE — 6370000000 HC RX 637 (ALT 250 FOR IP): Performed by: INTERNAL MEDICINE

## 2019-07-21 PROCEDURE — 36415 COLL VENOUS BLD VENIPUNCTURE: CPT

## 2019-07-21 RX ORDER — LISINOPRIL 10 MG/1
10 TABLET ORAL EVERY 12 HOURS SCHEDULED
Qty: 30 TABLET | Refills: 3 | Status: SHIPPED | OUTPATIENT
Start: 2019-07-21

## 2019-07-21 RX ORDER — SPIRONOLACTONE AND HYDROCHLOROTHIAZIDE 25; 25 MG/1; MG/1
0.5 TABLET ORAL DAILY
Qty: 30 TABLET | Refills: 0 | Status: SHIPPED | OUTPATIENT
Start: 2019-07-21 | End: 2019-10-02

## 2019-07-21 RX ORDER — CEPHALEXIN 250 MG/1
250 CAPSULE ORAL EVERY 6 HOURS SCHEDULED
Status: DISCONTINUED | OUTPATIENT
Start: 2019-07-21 | End: 2019-07-21 | Stop reason: HOSPADM

## 2019-07-21 RX ORDER — CEPHALEXIN 250 MG/1
250 CAPSULE ORAL 4 TIMES DAILY
Qty: 40 CAPSULE | Refills: 0 | Status: SHIPPED | OUTPATIENT
Start: 2019-07-21 | End: 2019-10-02

## 2019-07-21 RX ORDER — METOPROLOL SUCCINATE 100 MG/1
100 TABLET, EXTENDED RELEASE ORAL DAILY
Qty: 30 TABLET | Refills: 3 | Status: SHIPPED | OUTPATIENT
Start: 2019-07-22

## 2019-07-21 RX ADMIN — Medication 10 ML: at 09:11

## 2019-07-21 RX ADMIN — SPIRONOLACTONE 12.5 MG: 25 TABLET ORAL at 09:10

## 2019-07-21 RX ADMIN — HYDROCHLOROTHIAZIDE 12.5 MG: 12.5 CAPSULE ORAL at 09:09

## 2019-07-21 RX ADMIN — LISINOPRIL 10 MG: 10 TABLET ORAL at 09:09

## 2019-07-21 RX ADMIN — ENOXAPARIN SODIUM 30 MG: 30 INJECTION SUBCUTANEOUS at 09:10

## 2019-07-21 RX ADMIN — LEVOTHYROXINE SODIUM 75 MCG: 75 TABLET ORAL at 09:09

## 2019-07-21 RX ADMIN — PANTOPRAZOLE SODIUM 40 MG: 40 TABLET, DELAYED RELEASE ORAL at 09:09

## 2019-07-21 RX ADMIN — CEPHALEXIN 250 MG: 250 CAPSULE ORAL at 14:47

## 2019-07-21 RX ADMIN — METOPROLOL SUCCINATE 100 MG: 100 TABLET, EXTENDED RELEASE ORAL at 09:10

## 2019-07-21 ASSESSMENT — PAIN SCALES - PAIN ASSESSMENT IN ADVANCED DEMENTIA (PAINAD)
FACIALEXPRESSION: 0
BREATHING: 0
BREATHING: 0
TOTALSCORE: 0
NEGVOCALIZATION: 0
BREATHING: 0
CONSOLABILITY: 0
CONSOLABILITY: 0
TOTALSCORE: 0
BODYLANGUAGE: 0
TOTALSCORE: 0
NEGVOCALIZATION: 0
FACIALEXPRESSION: 0
NEGVOCALIZATION: 0
FACIALEXPRESSION: 0
CONSOLABILITY: 0
BODYLANGUAGE: 0
BODYLANGUAGE: 0

## 2019-07-21 ASSESSMENT — PAIN SCALES - WONG BAKER: WONGBAKER_NUMERICALRESPONSE: 0

## 2019-07-21 NOTE — PROGRESS NOTES
Physical Therapy  Facility/Department: [facility] MED SURG  Daily Treatment Note  NAME: Jaida Troy  : 1927  MRN: [id number]    Date of Service: 2019    Discharge Recommendations:  Continue to assess pending progress, 24 hour supervision or assist        Assessment   Body structures, Functions, Activity limitations: Decreased functional mobility ; Decreased strength;Decreased endurance;Decreased ADL status; Decreased cognition  Assessment: Pt requires Max A x 2 for stand pivot transfers. Pt with little to no participation during tx. PROM BLE's.   REQUIRES PT FOLLOW UP: Yes  Activity Tolerance  Activity Tolerance: Patient limited by fatigue;Patient limited by cognitive status     Patient Diagnosis(es): There were no encounter diagnoses. has a past medical history of Arthritis, Asthma, CHF (congestive heart failure) ([facility] [address] Diverticulitis, Female stress incontinence, Hyperlipidemia, Hypertension, and Thyroid disease. has a past surgical history that includes Hysterectomy; Cholecystectomy; Cataract removal; and Carpal tunnel release. Restrictions  Restrictions/Precautions  Restrictions/Precautions: General Precautions, Fall Risk  Subjective   General  Chart Reviewed: Yes  Subjective  Subjective: Pt's daugher present for session. Pt daugher pt more alert today. Pain Screening  Patient Currently in Pain: Other (comment)  Vital Signs  Patient Currently in Pain: Other (comment)       Orientation  Orientation  Overall Orientation Status: Impaired  Cognition      Objective      Transfers  Sit to Stand: 2 Person Assistance;Maximum Assistance  Stand to sit: 2 Person Assistance;Maximum Assistance  Squat Pivot Transfers: 2 Person Assistance;Maximum Assistance  Comment: Pt not responding to verbal commands. Stand pivot transfer from recliner <-> w/c with Max A x 2. Pt unable to follow commands to pivot or for hand placement.   Ambulation  Ambulation?: No     Balance  Sitting - Static: Poor  Standing - Static: Poor  Exercises  Hip Flexion: x 20reps  Hip Abduction: x 20reps  Knee Long Arc Quad: x 20reps  Ankle Pumps: x 20reps  Comments: PROM to B LE's while seated in recliner. G-Code     OutComes Score                                                     AM-PAC Score             Goals  Short term goals  Time Frame for Short term goals: 10 days  Short term goal 1: Patient will be moderate assistance with bed mobility  Short term goal 2: Patient will participate with stand pivot transfers and will require mod-max assist.  Patient Goals   Patient goals : unable to state goals due to dementia. Her family member reports hoping the patient will be able to assist with stand pivot transfers    Plan    Plan  Times per week: 7  Times per day: Twice a day  Plan Comment: 1x/day on weekends  Safety Devices  Type of devices:  All fall risk precautions in place, Gait belt, Patient at risk for falls, Call light within reach, Chair alarm in place, Left in chair     Therapy Time   Individual Concurrent Group Co-treatment   Time In 1242         Time Out 1305         Minutes [address] [place], [place]

## 2019-07-29 ENCOUNTER — HOSPITAL ENCOUNTER (OUTPATIENT)
Age: 84
Discharge: HOME | End: 2019-07-29
Payer: MEDICARE

## 2019-07-29 LAB
ABSOLUTE EOS #: 0.22 K/UL (ref 0–0.44)
ABSOLUTE IMMATURE GRANULOCYTE: 0.06 K/UL (ref 0–0.3)
ABSOLUTE LYMPH #: 1.73 K/UL (ref 1.1–3.7)
ABSOLUTE MONO #: 0.46 K/UL (ref 0.1–1.2)
ANION GAP SERPL CALCULATED.3IONS-SCNC: 14 MMOL/L (ref 9–17)
BASOPHILS # BLD: 0 % (ref 0–2)
BASOPHILS ABSOLUTE: <0.03 K/UL (ref 0–0.2)
BUN BLDV-MCNC: 34 MG/DL (ref 8–23)
BUN/CREAT BLD: 28 (ref 9–20)
CALCIUM SERPL-MCNC: 9.5 MG/DL (ref 8.6–10.4)
CHLORIDE BLD-SCNC: 101 MMOL/L (ref 98–107)
CO2: 20 MMOL/L (ref 20–31)
CREAT SERPL-MCNC: 1.22 MG/DL (ref 0.5–0.9)
DIFFERENTIAL TYPE: ABNORMAL
EOSINOPHILS RELATIVE PERCENT: 3 % (ref 1–4)
GFR AFRICAN AMERICAN: 50 ML/MIN
GFR NON-AFRICAN AMERICAN: 41 ML/MIN
GFR SERPL CREATININE-BSD FRML MDRD: ABNORMAL ML/MIN/{1.73_M2}
GFR SERPL CREATININE-BSD FRML MDRD: ABNORMAL ML/MIN/{1.73_M2}
GLUCOSE BLD-MCNC: 116 MG/DL (ref 70–99)
HCT VFR BLD CALC: 37.6 % (ref 36.3–47.1)
HEMOGLOBIN: 11.2 G/DL (ref 11.9–15.1)
IMMATURE GRANULOCYTES: 1 %
LYMPHOCYTES # BLD: 23 % (ref 24–43)
MCH RBC QN AUTO: 24.7 PG (ref 25.2–33.5)
MCHC RBC AUTO-ENTMCNC: 29.8 G/DL (ref 28.4–34.8)
MCV RBC AUTO: 83 FL (ref 82.6–102.9)
MONOCYTES # BLD: 6 % (ref 3–12)
NRBC AUTOMATED: 0 PER 100 WBC
PDW BLD-RTO: 18.5 % (ref 11.8–14.4)
PLATELET # BLD: 447 K/UL (ref 138–453)
PLATELET ESTIMATE: ABNORMAL
PMV BLD AUTO: 9.5 FL (ref 8.1–13.5)
POTASSIUM SERPL-SCNC: 4.4 MMOL/L (ref 3.7–5.3)
RBC # BLD: 4.53 M/UL (ref 3.95–5.11)
RBC # BLD: ABNORMAL 10*6/UL
SEG NEUTROPHILS: 67 % (ref 36–65)
SEGMENTED NEUTROPHILS ABSOLUTE COUNT: 4.92 K/UL (ref 1.5–8.1)
SODIUM BLD-SCNC: 135 MMOL/L (ref 135–144)
WBC # BLD: 7.4 K/UL (ref 3.5–11.3)
WBC # BLD: ABNORMAL 10*3/UL

## 2019-07-29 PROCEDURE — 85025 COMPLETE CBC W/AUTO DIFF WBC: CPT

## 2019-07-29 PROCEDURE — 80048 BASIC METABOLIC PNL TOTAL CA: CPT

## 2019-07-29 PROCEDURE — 36415 COLL VENOUS BLD VENIPUNCTURE: CPT

## 2019-08-12 ENCOUNTER — HOSPITAL ENCOUNTER (OUTPATIENT)
Age: 84
Discharge: HOME | End: 2019-08-12
Payer: MEDICARE

## 2019-08-12 LAB
ANION GAP SERPL CALCULATED.3IONS-SCNC: 17 MMOL/L (ref 9–17)
BUN BLDV-MCNC: 40 MG/DL (ref 8–23)
BUN/CREAT BLD: 34 (ref 9–20)
CALCIUM SERPL-MCNC: 10 MG/DL (ref 8.6–10.4)
CHLORIDE BLD-SCNC: 100 MMOL/L (ref 98–107)
CO2: 17 MMOL/L (ref 20–31)
CREAT SERPL-MCNC: 1.19 MG/DL (ref 0.5–0.9)
GFR AFRICAN AMERICAN: 52 ML/MIN
GFR NON-AFRICAN AMERICAN: 43 ML/MIN
GFR SERPL CREATININE-BSD FRML MDRD: ABNORMAL ML/MIN/{1.73_M2}
GFR SERPL CREATININE-BSD FRML MDRD: ABNORMAL ML/MIN/{1.73_M2}
GLUCOSE BLD-MCNC: 86 MG/DL (ref 70–99)
POTASSIUM SERPL-SCNC: 4.8 MMOL/L (ref 3.7–5.3)
SODIUM BLD-SCNC: 134 MMOL/L (ref 135–144)

## 2019-08-12 PROCEDURE — 36415 COLL VENOUS BLD VENIPUNCTURE: CPT

## 2019-08-12 PROCEDURE — 80048 BASIC METABOLIC PNL TOTAL CA: CPT

## 2019-09-24 ENCOUNTER — HOSPITAL ENCOUNTER (OUTPATIENT)
Age: 84
Discharge: HOME | End: 2019-09-24
Payer: MEDICARE

## 2019-09-24 LAB
ABSOLUTE EOS #: 0.34 K/UL (ref 0–0.44)
ABSOLUTE IMMATURE GRANULOCYTE: 0.11 K/UL (ref 0–0.3)
ABSOLUTE LYMPH #: 2.24 K/UL (ref 1.1–3.7)
ABSOLUTE MONO #: 0.78 K/UL (ref 0.1–1.2)
ANION GAP SERPL CALCULATED.3IONS-SCNC: 16 MMOL/L (ref 9–17)
BASOPHILS # BLD: 0 % (ref 0–2)
BASOPHILS ABSOLUTE: 0 K/UL (ref 0–0.2)
BUN BLDV-MCNC: 49 MG/DL (ref 8–23)
BUN/CREAT BLD: 32 (ref 9–20)
CALCIUM SERPL-MCNC: 9.3 MG/DL (ref 8.6–10.4)
CHLORIDE BLD-SCNC: 100 MMOL/L (ref 98–107)
CO2: 16 MMOL/L (ref 20–31)
CREAT SERPL-MCNC: 1.51 MG/DL (ref 0.5–0.9)
DIFFERENTIAL TYPE: ABNORMAL
EOSINOPHILS RELATIVE PERCENT: 3 % (ref 1–4)
GFR AFRICAN AMERICAN: 39 ML/MIN
GFR NON-AFRICAN AMERICAN: 32 ML/MIN
GFR SERPL CREATININE-BSD FRML MDRD: ABNORMAL ML/MIN/{1.73_M2}
GFR SERPL CREATININE-BSD FRML MDRD: ABNORMAL ML/MIN/{1.73_M2}
GLUCOSE BLD-MCNC: 142 MG/DL (ref 70–99)
HCT VFR BLD CALC: 31.2 % (ref 36.3–47.1)
HEMOGLOBIN: 9.7 G/DL (ref 11.9–15.1)
IMMATURE GRANULOCYTES: 1 %
LYMPHOCYTES # BLD: 20 % (ref 24–43)
MCH RBC QN AUTO: 26.7 PG (ref 25.2–33.5)
MCHC RBC AUTO-ENTMCNC: 31.1 G/DL (ref 28.4–34.8)
MCV RBC AUTO: 86 FL (ref 82.6–102.9)
MONOCYTES # BLD: 7 % (ref 3–12)
MORPHOLOGY: NORMAL
NRBC AUTOMATED: 0 PER 100 WBC
PDW BLD-RTO: 20.3 % (ref 11.8–14.4)
PLATELET # BLD: 366 K/UL (ref 138–453)
PLATELET ESTIMATE: ABNORMAL
PMV BLD AUTO: 9.6 FL (ref 8.1–13.5)
POTASSIUM SERPL-SCNC: 4.6 MMOL/L (ref 3.7–5.3)
RBC # BLD: 3.63 M/UL (ref 3.95–5.11)
RBC # BLD: ABNORMAL 10*6/UL
SEG NEUTROPHILS: 69 % (ref 36–65)
SEGMENTED NEUTROPHILS ABSOLUTE COUNT: 7.73 K/UL (ref 1.5–8.1)
SODIUM BLD-SCNC: 132 MMOL/L (ref 135–144)
WBC # BLD: 11.2 K/UL (ref 3.5–11.3)
WBC # BLD: ABNORMAL 10*3/UL

## 2019-09-24 PROCEDURE — 36415 COLL VENOUS BLD VENIPUNCTURE: CPT

## 2019-09-24 PROCEDURE — 85025 COMPLETE CBC W/AUTO DIFF WBC: CPT

## 2019-09-24 PROCEDURE — 80048 BASIC METABOLIC PNL TOTAL CA: CPT

## 2019-09-30 RX ORDER — SODIUM CHLORIDE 9 MG/ML
INJECTION, SOLUTION INTRAVENOUS CONTINUOUS
Status: CANCELLED | OUTPATIENT
Start: 2019-09-30

## 2019-09-30 RX ORDER — SODIUM CHLORIDE 0.9 % (FLUSH) 0.9 %
10 SYRINGE (ML) INJECTION PRN
Status: CANCELLED | OUTPATIENT
Start: 2019-09-30

## 2019-10-02 ENCOUNTER — HOSPITAL ENCOUNTER (OUTPATIENT)
Dept: INFUSION THERAPY | Age: 84
Discharge: HOME | End: 2019-10-02
Payer: MEDICARE

## 2019-10-02 ENCOUNTER — HOSPITAL ENCOUNTER (OUTPATIENT)
Age: 84
Setting detail: OBSERVATION
Discharge: HOME | End: 2019-10-03
Attending: EMERGENCY MEDICINE | Admitting: INTERNAL MEDICINE
Payer: MEDICARE

## 2019-10-02 ENCOUNTER — APPOINTMENT (OUTPATIENT)
Dept: GENERAL RADIOLOGY | Age: 84
End: 2019-10-02
Payer: MEDICARE

## 2019-10-02 ENCOUNTER — HOSPITAL ENCOUNTER (OUTPATIENT)
Age: 84
Discharge: HOME | End: 2019-10-04
Payer: MEDICARE

## 2019-10-02 VITALS
RESPIRATION RATE: 20 BRPM | DIASTOLIC BLOOD PRESSURE: 63 MMHG | TEMPERATURE: 97.3 F | SYSTOLIC BLOOD PRESSURE: 97 MMHG | HEART RATE: 80 BPM

## 2019-10-02 DIAGNOSIS — R55 NEAR SYNCOPE: Primary | ICD-10-CM

## 2019-10-02 DIAGNOSIS — D50.9 IRON DEFICIENCY ANEMIA, UNSPECIFIED IRON DEFICIENCY ANEMIA TYPE: Primary | ICD-10-CM

## 2019-10-02 DIAGNOSIS — T45.4X5A ADVERSE REACTION TO COMPOUND IRON PREPARATION, INITIAL ENCOUNTER: ICD-10-CM

## 2019-10-02 PROBLEM — T78.40XA ALLERGIC REACTION: Status: ACTIVE | Noted: 2019-10-02

## 2019-10-02 LAB
ABSOLUTE EOS #: 0.23 K/UL (ref 0–0.44)
ABSOLUTE IMMATURE GRANULOCYTE: 0.45 K/UL (ref 0–0.3)
ABSOLUTE LYMPH #: 4.29 K/UL (ref 1.1–3.7)
ABSOLUTE MONO #: 0.9 K/UL (ref 0.1–1.2)
ANION GAP SERPL CALCULATED.3IONS-SCNC: 18 MMOL/L (ref 9–17)
BASOPHILS # BLD: 0 % (ref 0–2)
BASOPHILS ABSOLUTE: 0 K/UL (ref 0–0.2)
BUN BLDV-MCNC: 28 MG/DL (ref 8–23)
BUN/CREAT BLD: 23 (ref 9–20)
CALCIUM SERPL-MCNC: 9 MG/DL (ref 8.6–10.4)
CHLORIDE BLD-SCNC: 106 MMOL/L (ref 98–107)
CO2: 14 MMOL/L (ref 20–31)
CREAT SERPL-MCNC: 1.22 MG/DL (ref 0.5–0.9)
DIFFERENTIAL TYPE: ABNORMAL
EOSINOPHILS RELATIVE PERCENT: 1 % (ref 1–4)
GFR AFRICAN AMERICAN: 50 ML/MIN
GFR NON-AFRICAN AMERICAN: 41 ML/MIN
GFR SERPL CREATININE-BSD FRML MDRD: ABNORMAL ML/MIN/{1.73_M2}
GFR SERPL CREATININE-BSD FRML MDRD: ABNORMAL ML/MIN/{1.73_M2}
GLUCOSE BLD-MCNC: 162 MG/DL (ref 70–99)
HCT VFR BLD CALC: 41.3 % (ref 36.3–47.1)
HEMOGLOBIN: 12.5 G/DL (ref 11.9–15.1)
IMMATURE GRANULOCYTES: 2 %
LYMPHOCYTES # BLD: 19 % (ref 24–43)
MCH RBC QN AUTO: 26.5 PG (ref 25.2–33.5)
MCHC RBC AUTO-ENTMCNC: 30.3 G/DL (ref 28.4–34.8)
MCV RBC AUTO: 87.7 FL (ref 82.6–102.9)
MONOCYTES # BLD: 4 % (ref 3–12)
MORPHOLOGY: ABNORMAL
NRBC AUTOMATED: 0 PER 100 WBC
PDW BLD-RTO: 21.2 % (ref 11.8–14.4)
PLATELET # BLD: 363 K/UL (ref 138–453)
PLATELET ESTIMATE: ABNORMAL
PMV BLD AUTO: 9.4 FL (ref 8.1–13.5)
POTASSIUM SERPL-SCNC: 4.5 MMOL/L (ref 3.7–5.3)
RBC # BLD: 4.71 M/UL (ref 3.95–5.11)
RBC # BLD: ABNORMAL 10*6/UL
SEG NEUTROPHILS: 74 % (ref 36–65)
SEGMENTED NEUTROPHILS ABSOLUTE COUNT: 16.73 K/UL (ref 1.5–8.1)
SODIUM BLD-SCNC: 138 MMOL/L (ref 135–144)
WBC # BLD: 22.6 K/UL (ref 3.5–11.3)
WBC # BLD: ABNORMAL 10*3/UL

## 2019-10-02 PROCEDURE — 96365 THER/PROPH/DIAG IV INF INIT: CPT

## 2019-10-02 PROCEDURE — G0378 HOSPITAL OBSERVATION PER HR: HCPCS

## 2019-10-02 PROCEDURE — 2580000003 HC RX 258: Performed by: INTERNAL MEDICINE

## 2019-10-02 PROCEDURE — 85025 COMPLETE CBC W/AUTO DIFF WBC: CPT

## 2019-10-02 PROCEDURE — 94664 DEMO&/EVAL PT USE INHALER: CPT

## 2019-10-02 PROCEDURE — 6360000002 HC RX W HCPCS

## 2019-10-02 PROCEDURE — 71045 X-RAY EXAM CHEST 1 VIEW: CPT

## 2019-10-02 PROCEDURE — 96375 TX/PRO/DX INJ NEW DRUG ADDON: CPT

## 2019-10-02 PROCEDURE — 2580000003 HC RX 258: Performed by: EMERGENCY MEDICINE

## 2019-10-02 PROCEDURE — 80048 BASIC METABOLIC PNL TOTAL CA: CPT

## 2019-10-02 PROCEDURE — 36415 COLL VENOUS BLD VENIPUNCTURE: CPT

## 2019-10-02 PROCEDURE — 96366 THER/PROPH/DIAG IV INF ADDON: CPT

## 2019-10-02 PROCEDURE — 6370000000 HC RX 637 (ALT 250 FOR IP)

## 2019-10-02 PROCEDURE — 96361 HYDRATE IV INFUSION ADD-ON: CPT

## 2019-10-02 PROCEDURE — 6360000002 HC RX W HCPCS: Performed by: INTERNAL MEDICINE

## 2019-10-02 PROCEDURE — 96374 THER/PROPH/DIAG INJ IV PUSH: CPT

## 2019-10-02 PROCEDURE — 96372 THER/PROPH/DIAG INJ SC/IM: CPT

## 2019-10-02 PROCEDURE — 6370000000 HC RX 637 (ALT 250 FOR IP): Performed by: INTERNAL MEDICINE

## 2019-10-02 PROCEDURE — 99285 EMERGENCY DEPT VISIT HI MDM: CPT

## 2019-10-02 RX ORDER — 0.9 % SODIUM CHLORIDE 0.9 %
500 INTRAVENOUS SOLUTION INTRAVENOUS ONCE
Status: COMPLETED | OUTPATIENT
Start: 2019-10-02 | End: 2019-10-02

## 2019-10-02 RX ORDER — SODIUM CHLORIDE 0.9 % (FLUSH) 0.9 %
10 SYRINGE (ML) INJECTION PRN
Status: DISCONTINUED | OUTPATIENT
Start: 2019-10-02 | End: 2019-10-02

## 2019-10-02 RX ORDER — IPRATROPIUM BROMIDE AND ALBUTEROL SULFATE 2.5; .5 MG/3ML; MG/3ML
1 SOLUTION RESPIRATORY (INHALATION)
Status: DISCONTINUED | OUTPATIENT
Start: 2019-10-02 | End: 2019-10-02

## 2019-10-02 RX ORDER — PANTOPRAZOLE SODIUM 20 MG/1
20 TABLET, DELAYED RELEASE ORAL
Status: DISCONTINUED | OUTPATIENT
Start: 2019-10-03 | End: 2019-10-03 | Stop reason: HOSPADM

## 2019-10-02 RX ORDER — SODIUM CHLORIDE 9 MG/ML
INJECTION, SOLUTION INTRAVENOUS CONTINUOUS
Status: DISCONTINUED | OUTPATIENT
Start: 2019-10-02 | End: 2019-10-03 | Stop reason: HOSPADM

## 2019-10-02 RX ORDER — SODIUM CHLORIDE 0.9 % (FLUSH) 0.9 %
10 SYRINGE (ML) INJECTION PRN
Status: DISCONTINUED | OUTPATIENT
Start: 2019-10-02 | End: 2019-10-03 | Stop reason: HOSPADM

## 2019-10-02 RX ORDER — ONDANSETRON 2 MG/ML
4 INJECTION INTRAMUSCULAR; INTRAVENOUS ONCE
Status: COMPLETED | OUTPATIENT
Start: 2019-10-02 | End: 2019-10-02

## 2019-10-02 RX ORDER — LANOLIN ALCOHOL/MO/W.PET/CERES
1000 CREAM (GRAM) TOPICAL NIGHTLY
Status: DISCONTINUED | OUTPATIENT
Start: 2019-10-02 | End: 2019-10-03 | Stop reason: HOSPADM

## 2019-10-02 RX ORDER — METHYLPREDNISOLONE SODIUM SUCCINATE 125 MG/2ML
125 INJECTION, POWDER, LYOPHILIZED, FOR SOLUTION INTRAMUSCULAR; INTRAVENOUS ONCE
Status: COMPLETED | OUTPATIENT
Start: 2019-10-02 | End: 2019-10-02

## 2019-10-02 RX ORDER — METHYLPREDNISOLONE SODIUM SUCCINATE 125 MG/2ML
INJECTION, POWDER, LYOPHILIZED, FOR SOLUTION INTRAMUSCULAR; INTRAVENOUS
Status: COMPLETED
Start: 2019-10-02 | End: 2019-10-02

## 2019-10-02 RX ORDER — DIPHENHYDRAMINE HYDROCHLORIDE 50 MG/ML
INJECTION INTRAMUSCULAR; INTRAVENOUS
Status: COMPLETED
Start: 2019-10-02 | End: 2019-10-02

## 2019-10-02 RX ORDER — LEVOTHYROXINE SODIUM 0.07 MG/1
75 TABLET ORAL DAILY
Status: DISCONTINUED | OUTPATIENT
Start: 2019-10-03 | End: 2019-10-03 | Stop reason: HOSPADM

## 2019-10-02 RX ORDER — METOPROLOL SUCCINATE 100 MG/1
100 TABLET, EXTENDED RELEASE ORAL DAILY
Status: DISCONTINUED | OUTPATIENT
Start: 2019-10-02 | End: 2019-10-03 | Stop reason: HOSPADM

## 2019-10-02 RX ORDER — MONTELUKAST SODIUM 10 MG/1
10 TABLET ORAL NIGHTLY
Status: DISCONTINUED | OUTPATIENT
Start: 2019-10-02 | End: 2019-10-03 | Stop reason: HOSPADM

## 2019-10-02 RX ORDER — SODIUM CHLORIDE 0.9 % (FLUSH) 0.9 %
10 SYRINGE (ML) INJECTION PRN
Status: CANCELLED | OUTPATIENT
Start: 2019-10-09

## 2019-10-02 RX ORDER — SODIUM CHLORIDE 0.9 % (FLUSH) 0.9 %
10 SYRINGE (ML) INJECTION EVERY 12 HOURS SCHEDULED
Status: DISCONTINUED | OUTPATIENT
Start: 2019-10-02 | End: 2019-10-03 | Stop reason: HOSPADM

## 2019-10-02 RX ORDER — PANTOPRAZOLE SODIUM 40 MG/1
TABLET, DELAYED RELEASE ORAL
Refills: 0 | COMMUNITY
Start: 2019-09-18

## 2019-10-02 RX ORDER — SODIUM CHLORIDE 9 MG/ML
INJECTION, SOLUTION INTRAVENOUS CONTINUOUS
Status: CANCELLED | OUTPATIENT
Start: 2019-10-09

## 2019-10-02 RX ORDER — DIPHENHYDRAMINE HYDROCHLORIDE 50 MG/ML
25 INJECTION INTRAMUSCULAR; INTRAVENOUS ONCE
Status: COMPLETED | OUTPATIENT
Start: 2019-10-02 | End: 2019-10-02

## 2019-10-02 RX ORDER — IPRATROPIUM BROMIDE AND ALBUTEROL SULFATE 2.5; .5 MG/3ML; MG/3ML
1 SOLUTION RESPIRATORY (INHALATION) ONCE
Status: COMPLETED | OUTPATIENT
Start: 2019-10-02 | End: 2019-10-02

## 2019-10-02 RX ORDER — IPRATROPIUM BROMIDE AND ALBUTEROL SULFATE 2.5; .5 MG/3ML; MG/3ML
SOLUTION RESPIRATORY (INHALATION)
Status: COMPLETED
Start: 2019-10-02 | End: 2019-10-02

## 2019-10-02 RX ADMIN — DIPHENHYDRAMINE HYDROCHLORIDE 25 MG: 50 INJECTION INTRAMUSCULAR; INTRAVENOUS at 14:20

## 2019-10-02 RX ADMIN — IPRATROPIUM BROMIDE AND ALBUTEROL SULFATE 3 ML: .5; 3 SOLUTION RESPIRATORY (INHALATION) at 14:26

## 2019-10-02 RX ADMIN — IRON SUCROSE 500 MG: 20 INJECTION, SOLUTION INTRAVENOUS at 09:50

## 2019-10-02 RX ADMIN — ONDANSETRON 4 MG: 2 INJECTION INTRAMUSCULAR; INTRAVENOUS at 13:06

## 2019-10-02 RX ADMIN — METHYLPREDNISOLONE SODIUM SUCCINATE 125 MG: 125 INJECTION, POWDER, FOR SOLUTION INTRAMUSCULAR; INTRAVENOUS at 14:23

## 2019-10-02 RX ADMIN — CYANOCOBALAMIN TAB 1000 MCG 1000 MCG: 1000 TAB at 21:02

## 2019-10-02 RX ADMIN — Medication 10 ML: at 13:04

## 2019-10-02 RX ADMIN — METOPROLOL SUCCINATE 100 MG: 100 TABLET, EXTENDED RELEASE ORAL at 21:02

## 2019-10-02 RX ADMIN — Medication 10 ML: at 21:01

## 2019-10-02 RX ADMIN — METHYLPREDNISOLONE SODIUM SUCCINATE 125 MG: 125 INJECTION, POWDER, LYOPHILIZED, FOR SOLUTION INTRAMUSCULAR; INTRAVENOUS at 14:23

## 2019-10-02 RX ADMIN — MONTELUKAST 10 MG: 10 TABLET, FILM COATED ORAL at 21:02

## 2019-10-02 RX ADMIN — ENOXAPARIN SODIUM 30 MG: 30 INJECTION SUBCUTANEOUS at 20:59

## 2019-10-02 RX ADMIN — SODIUM CHLORIDE: 9 INJECTION, SOLUTION INTRAVENOUS at 09:37

## 2019-10-02 RX ADMIN — DIPHENHYDRAMINE HYDROCHLORIDE 25 MG: 50 INJECTION, SOLUTION INTRAMUSCULAR; INTRAVENOUS at 14:20

## 2019-10-02 RX ADMIN — Medication 10 ML: at 09:37

## 2019-10-02 RX ADMIN — SODIUM CHLORIDE 500 ML: 9 INJECTION, SOLUTION INTRAVENOUS at 14:24

## 2019-10-02 RX ADMIN — IPRATROPIUM BROMIDE AND ALBUTEROL SULFATE 3 ML: 2.5; .5 SOLUTION RESPIRATORY (INHALATION) at 14:26

## 2019-10-02 ASSESSMENT — ENCOUNTER SYMPTOMS
NAUSEA: 0
ABDOMINAL DISTENTION: 0
SHORTNESS OF BREATH: 1
ABDOMINAL PAIN: 0
VOMITING: 0
WHEEZING: 0
TROUBLE SWALLOWING: 0
COUGH: 0
COLOR CHANGE: 0

## 2019-10-02 ASSESSMENT — PAIN SCALES - GENERAL: PAINLEVEL_OUTOF10: 0

## 2019-10-02 NOTE — PROGRESS NOTES
11: 36 Daughter at side feeding patient lunch at this time. 12:30 Ate fair for lunch. Daughter remains at side. 12:48 Patient had large emesis of tan fluid, and has rales in posterior montemayor. 12:52 Dr. Anayeli George notified of patients condition. Orders received to give zofran IV now and get a CXR upon discharge. Venofer stopped. NS infusing Zofran given as ordered. Patient still having small emesis at times. NS cont. 13:14 BP 78/48 13:16 BP 83/44 Resting in recliner chair Daughter at side 1330  97/63  13:54 Patient having noisy resp Patient is a DNR cc A per daughter. Daughter notified Dr. Anayeli George and informed her to have her transported to ER. Rapid response called. Patient incontinent of urine and BM. Transported to ER per supervisor and resp therapy per cart.

## 2019-10-03 VITALS
OXYGEN SATURATION: 98 % | HEART RATE: 80 BPM | TEMPERATURE: 97.7 F | SYSTOLIC BLOOD PRESSURE: 135 MMHG | RESPIRATION RATE: 16 BRPM | WEIGHT: 110.2 LBS | DIASTOLIC BLOOD PRESSURE: 76 MMHG | HEIGHT: 55 IN | BODY MASS INDEX: 25.51 KG/M2

## 2019-10-03 PROCEDURE — G0378 HOSPITAL OBSERVATION PER HR: HCPCS

## 2019-10-03 ASSESSMENT — PAIN SCALES - GENERAL
PAINLEVEL_OUTOF10: 0

## 2020-01-16 ENCOUNTER — HOSPITAL ENCOUNTER (INPATIENT)
Age: 85
LOS: 1 days | Discharge: HOME | DRG: 641 | End: 2020-01-17
Attending: INTERNAL MEDICINE | Admitting: INTERNAL MEDICINE
Payer: MEDICARE

## 2020-01-16 ENCOUNTER — APPOINTMENT (OUTPATIENT)
Dept: GENERAL RADIOLOGY | Age: 85
DRG: 641 | End: 2020-01-16
Attending: INTERNAL MEDICINE
Payer: MEDICARE

## 2020-01-16 PROBLEM — E86.0 DEHYDRATION: Status: ACTIVE | Noted: 2020-01-16

## 2020-01-16 LAB
-: ABNORMAL
ABO/RH: NORMAL
ABSOLUTE EOS #: 0.14 K/UL (ref 0–0.44)
ABSOLUTE IMMATURE GRANULOCYTE: 0.05 K/UL (ref 0–0.3)
ABSOLUTE LYMPH #: 2.2 K/UL (ref 1.1–3.7)
ABSOLUTE MONO #: 0.93 K/UL (ref 0.1–1.2)
AMORPHOUS: ABNORMAL
ANION GAP SERPL CALCULATED.3IONS-SCNC: 12 MMOL/L (ref 9–17)
ANTIBODY SCREEN: NEGATIVE
ARM BAND NUMBER: NORMAL
BACTERIA: ABNORMAL
BASOPHILS # BLD: 0 % (ref 0–2)
BASOPHILS ABSOLUTE: <0.03 K/UL (ref 0–0.2)
BILIRUBIN URINE: NEGATIVE
BUN BLDV-MCNC: 35 MG/DL (ref 8–23)
BUN/CREAT BLD: 33 (ref 9–20)
CALCIUM SERPL-MCNC: 9.6 MG/DL (ref 8.6–10.4)
CASTS UA: ABNORMAL /LPF
CHLORIDE BLD-SCNC: 104 MMOL/L (ref 98–107)
CO2: 17 MMOL/L (ref 20–31)
COLOR: YELLOW
COMMENT UA: ABNORMAL
CREAT SERPL-MCNC: 1.06 MG/DL (ref 0.5–0.9)
CRYSTALS, UA: ABNORMAL /HPF
DIFFERENTIAL TYPE: ABNORMAL
EOSINOPHILS RELATIVE PERCENT: 1 % (ref 1–4)
EPITHELIAL CELLS UA: ABNORMAL /HPF (ref 0–25)
EXPIRATION DATE: NORMAL
GFR AFRICAN AMERICAN: 59 ML/MIN
GFR NON-AFRICAN AMERICAN: 48 ML/MIN
GFR SERPL CREATININE-BSD FRML MDRD: ABNORMAL ML/MIN/{1.73_M2}
GFR SERPL CREATININE-BSD FRML MDRD: ABNORMAL ML/MIN/{1.73_M2}
GLUCOSE BLD-MCNC: 95 MG/DL (ref 70–99)
GLUCOSE URINE: NEGATIVE
HCT VFR BLD CALC: 41.1 % (ref 36.3–47.1)
HEMOGLOBIN: 12.9 G/DL (ref 11.9–15.1)
IMMATURE GRANULOCYTES: 0 %
KETONES, URINE: NEGATIVE
LEUKOCYTE ESTERASE, URINE: NEGATIVE
LYMPHOCYTES # BLD: 18 % (ref 24–43)
MCH RBC QN AUTO: 29.9 PG (ref 25.2–33.5)
MCHC RBC AUTO-ENTMCNC: 31.4 G/DL (ref 28.4–34.8)
MCV RBC AUTO: 95.1 FL (ref 82.6–102.9)
MONOCYTES # BLD: 8 % (ref 3–12)
MUCUS: ABNORMAL
NITRITE, URINE: NEGATIVE
NRBC AUTOMATED: 0 PER 100 WBC
OTHER OBSERVATIONS UA: ABNORMAL
PDW BLD-RTO: 12.6 % (ref 11.8–14.4)
PH UA: 5.5 (ref 5–9)
PLATELET # BLD: 288 K/UL (ref 138–453)
PLATELET ESTIMATE: ABNORMAL
PMV BLD AUTO: 9.6 FL (ref 8.1–13.5)
POTASSIUM SERPL-SCNC: 5 MMOL/L (ref 3.7–5.3)
PROTEIN UA: ABNORMAL
RBC # BLD: 4.32 M/UL (ref 3.95–5.11)
RBC # BLD: ABNORMAL 10*6/UL
RBC UA: ABNORMAL /HPF (ref 0–2)
RENAL EPITHELIAL, UA: ABNORMAL /HPF
SEG NEUTROPHILS: 73 % (ref 36–65)
SEGMENTED NEUTROPHILS ABSOLUTE COUNT: 9.03 K/UL (ref 1.5–8.1)
SODIUM BLD-SCNC: 133 MMOL/L (ref 135–144)
SPECIFIC GRAVITY UA: 1.02 (ref 1.01–1.02)
TRICHOMONAS: ABNORMAL
TURBIDITY: CLEAR
URINE HGB: NEGATIVE
UROBILINOGEN, URINE: NORMAL
WBC # BLD: 12.4 K/UL (ref 3.5–11.3)
WBC # BLD: ABNORMAL 10*3/UL
WBC UA: ABNORMAL /HPF (ref 0–5)
YEAST: ABNORMAL

## 2020-01-16 PROCEDURE — 74019 RADEX ABDOMEN 2 VIEWS: CPT

## 2020-01-16 PROCEDURE — 86850 RBC ANTIBODY SCREEN: CPT

## 2020-01-16 PROCEDURE — 36415 COLL VENOUS BLD VENIPUNCTURE: CPT

## 2020-01-16 PROCEDURE — 80048 BASIC METABOLIC PNL TOTAL CA: CPT

## 2020-01-16 PROCEDURE — 71045 X-RAY EXAM CHEST 1 VIEW: CPT

## 2020-01-16 PROCEDURE — 93005 ELECTROCARDIOGRAM TRACING: CPT | Performed by: INTERNAL MEDICINE

## 2020-01-16 PROCEDURE — 86900 BLOOD TYPING SEROLOGIC ABO: CPT

## 2020-01-16 PROCEDURE — 51702 INSERT TEMP BLADDER CATH: CPT

## 2020-01-16 PROCEDURE — 86901 BLOOD TYPING SEROLOGIC RH(D): CPT

## 2020-01-16 PROCEDURE — 1200000000 HC SEMI PRIVATE

## 2020-01-16 PROCEDURE — 81001 URINALYSIS AUTO W/SCOPE: CPT

## 2020-01-16 PROCEDURE — 85025 COMPLETE CBC W/AUTO DIFF WBC: CPT

## 2020-01-16 PROCEDURE — 2580000003 HC RX 258: Performed by: INTERNAL MEDICINE

## 2020-01-16 RX ORDER — DEXTROSE, SODIUM CHLORIDE, SODIUM LACTATE, POTASSIUM CHLORIDE, AND CALCIUM CHLORIDE 5; .6; .31; .03; .02 G/100ML; G/100ML; G/100ML; G/100ML; G/100ML
INJECTION, SOLUTION INTRAVENOUS CONTINUOUS
Status: DISCONTINUED | OUTPATIENT
Start: 2020-01-16 | End: 2020-01-17 | Stop reason: HOSPADM

## 2020-01-16 RX ORDER — METOPROLOL SUCCINATE 100 MG/1
100 TABLET, EXTENDED RELEASE ORAL DAILY
Status: DISCONTINUED | OUTPATIENT
Start: 2020-01-16 | End: 2020-01-17 | Stop reason: HOSPADM

## 2020-01-16 RX ORDER — LEVOTHYROXINE SODIUM 0.07 MG/1
75 TABLET ORAL DAILY
Status: DISCONTINUED | OUTPATIENT
Start: 2020-01-16 | End: 2020-01-17 | Stop reason: HOSPADM

## 2020-01-16 RX ADMIN — SODIUM CHLORIDE, SODIUM LACTATE, POTASSIUM CHLORIDE, CALCIUM CHLORIDE AND DEXTROSE MONOHYDRATE: 5; 600; 310; 30; 20 INJECTION, SOLUTION INTRAVENOUS at 17:23

## 2020-01-17 VITALS
TEMPERATURE: 98.7 F | OXYGEN SATURATION: 95 % | DIASTOLIC BLOOD PRESSURE: 70 MMHG | WEIGHT: 96.5 LBS | RESPIRATION RATE: 16 BRPM | HEART RATE: 86 BPM | SYSTOLIC BLOOD PRESSURE: 141 MMHG | HEIGHT: 57 IN | BODY MASS INDEX: 20.82 KG/M2

## 2020-01-17 LAB
ANION GAP SERPL CALCULATED.3IONS-SCNC: 11 MMOL/L (ref 9–17)
BUN BLDV-MCNC: 34 MG/DL (ref 8–23)
BUN/CREAT BLD: 34 (ref 9–20)
CALCIUM SERPL-MCNC: 9.4 MG/DL (ref 8.6–10.4)
CHLORIDE BLD-SCNC: 105 MMOL/L (ref 98–107)
CO2: 17 MMOL/L (ref 20–31)
CREAT SERPL-MCNC: 0.99 MG/DL (ref 0.5–0.9)
EKG ATRIAL RATE: 69 BPM
EKG P AXIS: 23 DEGREES
EKG P-R INTERVAL: 118 MS
EKG Q-T INTERVAL: 378 MS
EKG QRS DURATION: 72 MS
EKG QTC CALCULATION (BAZETT): 405 MS
EKG R AXIS: -11 DEGREES
EKG T AXIS: 45 DEGREES
EKG VENTRICULAR RATE: 69 BPM
GFR AFRICAN AMERICAN: >60 ML/MIN
GFR NON-AFRICAN AMERICAN: 52 ML/MIN
GFR SERPL CREATININE-BSD FRML MDRD: ABNORMAL ML/MIN/{1.73_M2}
GFR SERPL CREATININE-BSD FRML MDRD: ABNORMAL ML/MIN/{1.73_M2}
GLUCOSE BLD-MCNC: 134 MG/DL (ref 70–99)
POTASSIUM SERPL-SCNC: 4.8 MMOL/L (ref 3.7–5.3)
SODIUM BLD-SCNC: 133 MMOL/L (ref 135–144)
THYROXINE, FREE: 1.71 NG/DL (ref 0.93–1.7)
TSH SERPL DL<=0.05 MIU/L-ACNC: 0.13 MIU/L (ref 0.3–5)

## 2020-01-17 PROCEDURE — 84439 ASSAY OF FREE THYROXINE: CPT

## 2020-01-17 PROCEDURE — 80048 BASIC METABOLIC PNL TOTAL CA: CPT

## 2020-01-17 PROCEDURE — 84443 ASSAY THYROID STIM HORMONE: CPT

## 2020-01-17 PROCEDURE — 36415 COLL VENOUS BLD VENIPUNCTURE: CPT

## 2020-01-17 PROCEDURE — 93010 ELECTROCARDIOGRAM REPORT: CPT | Performed by: INTERNAL MEDICINE

## 2020-01-17 RX ORDER — PANTOPRAZOLE SODIUM 40 MG/1
40 TABLET, DELAYED RELEASE ORAL
Status: DISCONTINUED | OUTPATIENT
Start: 2020-01-17 | End: 2020-01-17 | Stop reason: HOSPADM

## 2020-01-17 NOTE — PROGRESS NOTES
Discharge instructions reviewed with patient's daughter Radha Marquis. Denies questions or concerns at this time. Patient discharged to home with daughter at this time, transported via w/c to private [facility].

## 2020-01-17 NOTE — PLAN OF CARE
Problem: Falls - Risk of:  Goal: Will remain free from falls  Description  Will remain free from falls  1/17/2020 1038 by Smooth Hanson RN  Outcome: Ongoing  1/17/2020 0348 by Annika Crouch RN  Outcome: Ongoing  Goal: Absence of physical injury  Description  Absence of physical injury  1/17/2020 0348 by Annika Crouch RN  Outcome: Ongoing     Problem: Risk for Impaired Skin Integrity  Goal: Tissue integrity - skin and mucous membranes  Description  Structural intactness and normal physiological function of skin and  mucous membranes.   1/17/2020 1038 by Smooth Hanson RN  Outcome: Ongoing  1/17/2020 0348 by Annika Crouch RN  Outcome: Ongoing     Problem: Safety:  Goal: Free from accidental physical injury  Description  Free from accidental physical injury  1/17/2020 1038 by Smooth Hanson RN  Outcome: Ongoing  1/17/2020 0348 by Annika Crouch RN  Outcome: Ongoing  Goal: Free from intentional harm  Description  Free from intentional harm  1/17/2020 0348 by Annika Crouch RN  Outcome: Ongoing     Problem: Daily Care:  Goal: Daily care needs are met  Description  Daily care needs are met  1/17/2020 1038 by Smooth Hanson RN  Outcome: Ongoing  1/17/2020 0348 by Annika Crouch RN  Outcome: Ongoing     Problem: Discharge Planning:  Goal: Patients continuum of care needs are met  Description  Patients continuum of care needs are met  1/17/2020 1038 by Smooth Hanson RN  Outcome: Ongoing  1/17/2020 0348 by Annika Crouch RN  Outcome: Ongoing     Problem: Nutrition  Goal: Optimal nutrition therapy  1/17/2020 0828 by Katya Ruby RD, LD  Outcome: Ongoing  Note:   Nutrition Problem: Inadequate oral intake  Intervention: Food and/or Nutrient Delivery: Continue NPO  Nutritional Goals: PO>75% meals on advanced diet

## 2020-01-17 NOTE — DISCHARGE SUMMARY
Patient ID:  [facility]  [id number]  12/11/1927    Admission date: 1/16/2020    Discharge date:  01/17/2020    Primary Discharge Diagnoses: Active Problems:    Dehydration  Resolved Problems:    * No resolved hospital problems.  *      Additional Diagnoses:       Diagnosis Date    Arthritis     Asthma     CHF (congestive heart failure) (HCC)     Diverticulitis     Female stress incontinence     Hyperlipidemia     Hypertension     Pulmonary HTN ([address]     Thyroid disease        Procedures:  None    Complications: None    Discharge Condition: stable    Disposition: home    Discharge Medications:   Mason Castor   Home Medication Instructions [id number]    Printed on:01/17/20 8968   Medication Information                      levothyroxine (SYNTHROID) 75 MCG tablet  Take 75 mcg by mouth Daily             lisinopril (PRINIVIL;ZESTRIL) 10 MG tablet  Take 1 tablet by mouth every 12 hours             metoprolol succinate (TOPROL XL) 100 MG extended release tablet  Take 1 tablet by mouth daily             montelukast (SINGULAIR) 10 MG tablet  Take 10 mg by mouth nightly             pantoprazole (PROTONIX) 40 MG tablet  take 1 tablet by mouth once daily             vitamin B-12 (CYANOCOBALAMIN) 1000 MCG tablet  Take 1,000 mcg by mouth nightly                 Follow-up in 2 Weeks    Signed:  Bridgette Rainey M.D.  1/17/2020  3:42 PM

## 2020-01-17 NOTE — H&P
[address]                              HISTORY AND PHYSICAL    PATIENT NAME: Ariadna Baum                  :        1927  MED REC NO:   [id number]                              ROOM:       3453  ACCOUNT NO:   [de-identified]                           ADMIT DATE: 2020  PROVIDER:     Romulo Seogvia    CHIEF COMPLAINT:  Altered sensorium. HISTORY OF PRESENT ILLNESS:  The patient lives with her daughter,  Lata Pillai. Over the last week or so, she has not been doing well. She  has not been eating well. She has had periods of hallucinations  periodically. Did not have any fever or chills. Her appetite has  decreased substantially and today she did not even want to eat. She was able to stand and pivot at least at the time of toileting, but  in the last few days, she could not even do that. Her verbal and  nonverbal interaction have markedly diminished. She seemed to be pale. The daughter, who is also a nurse, noticed her skin turgor has been poor  and because of that, I was contacted this morning and we decided to  bring her in because of multiple issues. The patient has not been able  to provide any history. PAST MEDICAL HISTORY:  Known to have hypothyroidism, pulmonary  hypertension, hypertension, hyperlipidemia, remote history of  diverticulitis, history of CHF, diastolic, chronic. She also has DJD  and asthma. SOCIAL HISTORY:  . Lives with the daughter. REVIEW OF SYSTEMS:  Not available. PHYSICAL EXAMINATION:  GENERAL:  Shows an elderly white female who seemed to be minimally  responsive _____ to noxious stimulus. HEENT:  Head is normal.  ENT not available for exam as she is not very  cooperative. NECK:  Shows no obvious JVP engorgement.   CHEST:  Symmetrical.  Expansions reveal no significant abnormalities  except her breathing is rather shallow and she does not

## 2020-01-17 NOTE — PROGRESS NOTES
Nutrition Assessment    Type and Reason for Visit: Initial, Positive Nutrition Screen    Nutrition Recommendations:  Advance diet as GI feasible    Nutrition Assessment: Inadequate nutrient intakes r/t alteration in GI function, AEB ileus and NPO status. Near significant weight losses (7.3%) over time, from 103# usual weights in October (no recent values to eval otherwise). Reportedly a good eater up until last 2-3 days. Weight rebounding with hydration. Will monitor duration of NPO status and needs for supplements/alternative nutrition     Malnutrition Assessment:  · Malnutrition Status: At risk for malnutrition  · Context: Acute illness or injury  · Findings of the 6 clinical characteristics of malnutrition (Minimum of 2 out of 6 clinical characteristics is required to make the diagnosis of moderate or severe Protein Calorie Malnutrition based on AND/ASPEN Guidelines):  1. Energy Intake-Less than or equal to 50% of estimated energy requirement, (last 2-3 days)    2. Weight Loss-5% loss or greater, in 3 months  3. Fat Loss-No significant subcutaneous fat loss,    4. Muscle Loss-No significant muscle mass loss,    5. Fluid Accumulation-No significant fluid accumulation,    6.  Strength-Not measured    Nutrition Risk Level: Moderate    Nutrient Needs:  · Estimated Daily Total Kcal: 1139-4551(36-28)  · Estimated Daily Protein (g): 57-66(1.3-1.5)  · Estimated Daily Total Fluid (ml/day): 1400    Nutrition Diagnosis:   · Problem: Inadequate oral intake  · Etiology: related to Alteration in GI function     Signs and symptoms:  as evidenced by NPO status due to medical condition(ileus)    Objective Information:  · Nutrition-Focused Physical Findings: no malnutrition indices noted on limited view of patient  · Wound Type: None  · Current Nutrition Therapies:  · Oral Diet Orders: NPO(Presumed.   No diet order on file)   · Oral Diet intake: Unable to assess(No records)  · Oral Nutrition Supplement (ONS) Orders: None  · Anthropometric Measures:  · Ht: 4' 9\" (144.8 cm)   · Current Body Wt: 96 lb 8 oz (43.8 kg)  · Admission Body Wt: 95 lb 8 oz (43.3 kg)  · Usual Body Wt: 103 lb (46.7 kg)(October, per daughter)  · % Weight Change:  ,  7.3% weight losses from October  · Ideal Body Wt: 97 lb (44 kg), % Ideal Body 99%  · BMI Classification: BMI 18.5 - 24.9 Normal Weight    Lab Results   Component Value Date     (L) 01/17/2020    K 4.8 01/17/2020     01/17/2020    CO2 17 (L) 01/17/2020    BUN 34 (H) 01/17/2020    CREATININE 0.99 (H) 01/17/2020    GLUCOSE 134 (H) 01/17/2020    CALCIUM 9.4 01/17/2020    PROT 6.4 07/18/2019    LABALBU 3.5 07/18/2019    BILITOT 0.27 (L) 07/18/2019    ALKPHOS 78 07/18/2019    AST 17 07/18/2019    ALT 11 07/18/2019    LABGLOM 52 (L) 01/17/2020    GFRAA >60 01/17/2020     No results found for: LABA1C  No results found for: EAG    Nutrition Interventions:   Continue NPO  Continued Inpatient Monitoring, Coordination of Care, Education not appropriate at this time    Nutrition Evaluation:   · Evaluation: Goals set   · Goals: PO>75% meals on advanced diet    · Monitoring: Meal Intake, Nutrition Progression, Pertinent Labs, Weight, I&O, Patient/Family Education, Monitor Bowel Function     Electronically signed by Prince Sheppard RD, LD on 1/17/20 at 8:24 AM    Contact Number: [phone]

## 2020-01-17 NOTE — PLAN OF CARE
Problem: Falls - Risk of:  Goal: Will remain free from falls  Description  Will remain free from falls  1/17/2020 0348 by Jade Mena RN  Outcome: Ongoing  1/16/2020 1456 by Guss Castleman, RN  Outcome: Ongoing  Goal: Absence of physical injury  Description  Absence of physical injury  1/17/2020 0348 by Jade Mena RN  Outcome: Ongoing  1/16/2020 1456 by Guss Castleman, RN  Outcome: Ongoing     Problem: Risk for Impaired Skin Integrity  Goal: Tissue integrity - skin and mucous membranes  Description  Structural intactness and normal physiological function of skin and  mucous membranes.   1/17/2020 0348 by Jade Mena RN  Outcome: Ongoing  1/16/2020 1456 by Guss Castleman, RN  Outcome: Ongoing     Problem: Safety:  Goal: Free from accidental physical injury  Description  Free from accidental physical injury  1/17/2020 0348 by Jade Mena RN  Outcome: Ongoing  1/16/2020 1456 by Guss Castleman, RN  Outcome: Ongoing  Goal: Free from intentional harm  Description  Free from intentional harm  1/17/2020 0348 by Jade Mena RN  Outcome: Ongoing  1/16/2020 1456 by Guss Castleman, RN  Outcome: Ongoing     Problem: Daily Care:  Goal: Daily care needs are met  Description  Daily care needs are met  1/17/2020 0348 by Jade Mena RN  Outcome: Ongoing  1/16/2020 1456 by Guss Castleman, RN  Outcome: Ongoing     Problem: Discharge Planning:  Goal: Patients continuum of care needs are met  Description  Patients continuum of care needs are met  1/17/2020 0348 by Jade Mena RN  Outcome: Ongoing  1/16/2020 1456 by Guss Castleman, RN  Outcome: Ongoing

## 2020-01-17 NOTE — PROGRESS NOTES
Discussed discharge plans with the daughter. Patient is a 80year old female here with Altered sensorium . She is alert but confused. Patient is a  and lives with her daughter and son-in-law. She uses a transfer chair, wheelchair, shower chair , and grab bars. The daughter and son- in-law do the cooking and the cleaning. The patient requires total care with her ADL's. When the family is at work they have a care giver in the home caring for her. The daughter manages the medications and the transportation. Her PCP is Dr. Coretta Hitchcock. She has medical insurance that helps with medication costs. The discharge plan is home with the family. She has a DNR-CCA on file. LSW to monitor and assist with any needs or concerns as they arise.     CLOVER Martinez

## 2020-01-18 ENCOUNTER — CARE COORDINATION (OUTPATIENT)
Dept: CASE MANAGEMENT | Age: 85
End: 2020-01-18

## 2020-01-18 PROCEDURE — 1111F DSCHRG MED/CURRENT MED MERGE: CPT | Performed by: INTERNAL MEDICINE

## 2020-01-18 NOTE — CARE COORDINATION
[facility] Transitions Initial Follow Up Call    Call within 2 business days of discharge: Yes    Patient: Dru Tapia Patient : 1927   MRN: <[id number]>  Reason for Admission: Dehydration  Discharge Date: 20 RARS: Readmission Risk Score: 14      Last Discharge [address]       Complaint Diagnosis Description Type Department Provider    20   Admission (Discharged) [facility] AT THE [facility] Bridgette Rainey MD           Spoke with: [facility]: [facility]    Non-face-to-face services provided:  Obtained and reviewed discharge summary and/or continuity of care documents     Care Transitions 24 Hour Call    Do you have any ongoing symptoms?:  No  Do you have a copy of your discharge instructions?:  Yes  Do you have all of your prescriptions and are they filled?:  Yes  Have you been contacted by a [address]?:  No  Have you scheduled your follow up appointment?:  No (Comment: Daughter will make appointment)  Were you discharged with any Home Care or Post Acute Services:  No  Post Acute Services:  Home Health  Do you feel like you have everything you need to keep you well at home?:  Yes  Care Transitions Interventions                             Called the home number, too much static to hear. Call mobile number. Spoke with daughter and caregiver Jaye Luna. Michael Blanca stated patient had no c/o sob, cough, congestion, swelling or n/v. Patient slept well. Patient ate breakfast, drank ensure and tea. Daughter Michael Blanca is a nurse in the office of PCP. She will check the status of a f/u appointment with PCP. Medications reviewed. Will update CTN. ALEX Garces RN  Care Transition Nurse [phone]          Follow Up  No future appointments.     Danna Irving RN

## 2020-01-20 ENCOUNTER — CARE COORDINATION (OUTPATIENT)
Dept: CASE MANAGEMENT | Age: 85
End: 2020-01-20

## 2020-02-15 PROBLEM — E86.0 DEHYDRATION: Status: RESOLVED | Noted: 2020-01-16 | Resolved: 2020-02-15

## 2021-01-01 ENCOUNTER — HOSPITAL ENCOUNTER (OUTPATIENT)
Age: 86
Discharge: HOME | End: 2021-10-18
Payer: MEDICARE

## 2021-01-01 LAB
ANION GAP SERPL CALCULATED.3IONS-SCNC: 15 MMOL/L (ref 9–17)
BUN BLDV-MCNC: 34 MG/DL (ref 8–23)
CHLORIDE BLD-SCNC: 96 MMOL/L (ref 98–107)
CO2: 21 MMOL/L (ref 20–31)
CREAT SERPL-MCNC: 1.57 MG/DL (ref 0.5–0.9)
GFR AFRICAN AMERICAN: 37 ML/MIN
GFR NON-AFRICAN AMERICAN: 31 ML/MIN
GFR SERPL CREATININE-BSD FRML MDRD: ABNORMAL ML/MIN/{1.73_M2}
GFR SERPL CREATININE-BSD FRML MDRD: ABNORMAL ML/MIN/{1.73_M2}
HCT VFR BLD CALC: 37.6 % (ref 36.3–47.1)
HEMOGLOBIN: 12.5 G/DL (ref 11.9–15.1)
MCH RBC QN AUTO: 30.9 PG (ref 25.2–33.5)
MCHC RBC AUTO-ENTMCNC: 33.2 G/DL (ref 28.4–34.8)
MCV RBC AUTO: 93.1 FL (ref 82.6–102.9)
NRBC AUTOMATED: 0 PER 100 WBC
PDW BLD-RTO: 12.3 % (ref 11.8–14.4)
PLATELET # BLD: 323 K/UL (ref 138–453)
PMV BLD AUTO: 9.5 FL (ref 8.1–13.5)
POTASSIUM SERPL-SCNC: 5 MMOL/L (ref 3.7–5.3)
RBC # BLD: 4.04 M/UL (ref 3.95–5.11)
SODIUM BLD-SCNC: 132 MMOL/L (ref 135–144)
TSH SERPL DL<=0.05 MIU/L-ACNC: 2.4 MIU/L (ref 0.3–5)
WBC # BLD: 9.6 K/UL (ref 3.5–11.3)

## 2021-01-01 PROCEDURE — 85027 COMPLETE CBC AUTOMATED: CPT

## 2021-01-01 PROCEDURE — 84520 ASSAY OF UREA NITROGEN: CPT

## 2021-01-01 PROCEDURE — 84443 ASSAY THYROID STIM HORMONE: CPT

## 2021-01-01 PROCEDURE — 80051 ELECTROLYTE PANEL: CPT

## 2021-01-01 PROCEDURE — 82565 ASSAY OF CREATININE: CPT

## 2021-01-01 PROCEDURE — 36415 COLL VENOUS BLD VENIPUNCTURE: CPT

## 2021-04-06 ENCOUNTER — HOSPITAL ENCOUNTER (OUTPATIENT)
Age: 86
Discharge: HOME | End: 2021-04-06
Payer: MEDICARE

## 2021-04-06 LAB
ALBUMIN SERPL-MCNC: 4 G/DL (ref 3.5–5.2)
ALBUMIN/GLOBULIN RATIO: 1.1 (ref 1–2.5)
ALP BLD-CCNC: 100 U/L (ref 35–104)
ALT SERPL-CCNC: 14 U/L (ref 5–33)
ANION GAP SERPL CALCULATED.3IONS-SCNC: 12 MMOL/L (ref 9–17)
AST SERPL-CCNC: 22 U/L
BILIRUB SERPL-MCNC: 0.25 MG/DL (ref 0.3–1.2)
BUN BLDV-MCNC: 25 MG/DL (ref 8–23)
BUN/CREAT BLD: 21 (ref 9–20)
CALCIUM SERPL-MCNC: 10.2 MG/DL (ref 8.6–10.4)
CHLORIDE BLD-SCNC: 100 MMOL/L (ref 98–107)
CO2: 22 MMOL/L (ref 20–31)
CREAT SERPL-MCNC: 1.19 MG/DL (ref 0.5–0.9)
GFR AFRICAN AMERICAN: 51 ML/MIN
GFR NON-AFRICAN AMERICAN: 42 ML/MIN
GFR SERPL CREATININE-BSD FRML MDRD: ABNORMAL ML/MIN/{1.73_M2}
GFR SERPL CREATININE-BSD FRML MDRD: ABNORMAL ML/MIN/{1.73_M2}
GLUCOSE BLD-MCNC: 91 MG/DL (ref 70–99)
HCT VFR BLD CALC: 37.7 % (ref 36.3–47.1)
HEMOGLOBIN: 12.6 G/DL (ref 11.9–15.1)
MAGNESIUM: 1.7 MG/DL (ref 1.6–2.6)
MCH RBC QN AUTO: 30.7 PG (ref 25.2–33.5)
MCHC RBC AUTO-ENTMCNC: 33.4 G/DL (ref 28.4–34.8)
MCV RBC AUTO: 91.7 FL (ref 82.6–102.9)
NRBC AUTOMATED: 0 PER 100 WBC
PDW BLD-RTO: 12.5 % (ref 11.8–14.4)
PLATELET # BLD: 326 K/UL (ref 138–453)
PMV BLD AUTO: 9.2 FL (ref 8.1–13.5)
POTASSIUM SERPL-SCNC: 4.5 MMOL/L (ref 3.7–5.3)
RBC # BLD: 4.11 M/UL (ref 3.95–5.11)
SODIUM BLD-SCNC: 134 MMOL/L (ref 135–144)
TOTAL PROTEIN: 7.6 G/DL (ref 6.4–8.3)
TSH SERPL DL<=0.05 MIU/L-ACNC: 3.84 MIU/L (ref 0.3–5)
VITAMIN B-12: >2000 PG/ML (ref 232–1245)
WBC # BLD: 8.7 K/UL (ref 3.5–11.3)

## 2021-04-06 PROCEDURE — 82607 VITAMIN B-12: CPT

## 2021-04-06 PROCEDURE — 80053 COMPREHEN METABOLIC PANEL: CPT

## 2021-04-06 PROCEDURE — 84439 ASSAY OF FREE THYROXINE: CPT

## 2021-04-06 PROCEDURE — 85027 COMPLETE CBC AUTOMATED: CPT

## 2021-04-06 PROCEDURE — 83735 ASSAY OF MAGNESIUM: CPT

## 2021-04-06 PROCEDURE — 84443 ASSAY THYROID STIM HORMONE: CPT

## 2021-04-06 PROCEDURE — 80061 LIPID PANEL: CPT

## 2021-04-06 PROCEDURE — 36415 COLL VENOUS BLD VENIPUNCTURE: CPT

## 2021-04-07 LAB
CHOLESTEROL/HDL RATIO: 4.1
CHOLESTEROL: 206 MG/DL
HDLC SERPL-MCNC: 50 MG/DL
LDL CHOLESTEROL: 124 MG/DL (ref 0–130)
THYROXINE, FREE: 1.6 NG/DL (ref 0.93–1.7)
TRIGL SERPL-MCNC: 159 MG/DL
VLDLC SERPL CALC-MCNC: ABNORMAL MG/DL (ref 1–30)

## 2022-01-01 ENCOUNTER — HOSPITAL ENCOUNTER (INPATIENT)
Age: 87
LOS: 3 days | Discharge: HOME | DRG: 194 | End: 2022-01-23
Attending: EMERGENCY MEDICINE | Admitting: INTERNAL MEDICINE
Payer: MEDICARE

## 2022-01-01 ENCOUNTER — HOSPITAL ENCOUNTER (OUTPATIENT)
Age: 87
Setting detail: SPECIMEN
Discharge: HOME | End: 2022-02-25
Payer: MEDICARE

## 2022-01-01 ENCOUNTER — APPOINTMENT (OUTPATIENT)
Dept: CT IMAGING | Age: 87
DRG: 194 | End: 2022-01-01
Payer: MEDICARE

## 2022-01-01 ENCOUNTER — APPOINTMENT (OUTPATIENT)
Dept: GENERAL RADIOLOGY | Age: 87
DRG: 871 | End: 2022-01-01
Payer: MEDICARE

## 2022-01-01 ENCOUNTER — APPOINTMENT (OUTPATIENT)
Dept: CT IMAGING | Age: 87
DRG: 871 | End: 2022-01-01
Payer: MEDICARE

## 2022-01-01 ENCOUNTER — APPOINTMENT (OUTPATIENT)
Dept: NON INVASIVE DIAGNOSTICS | Age: 87
DRG: 871 | End: 2022-01-01
Payer: MEDICARE

## 2022-01-01 ENCOUNTER — HOSPITAL ENCOUNTER (INPATIENT)
Age: 87
LOS: 2 days | Discharge: HOME | DRG: 871 | End: 2022-04-22
Attending: EMERGENCY MEDICINE | Admitting: FAMILY MEDICINE
Payer: MEDICARE

## 2022-01-01 ENCOUNTER — HOSPITAL ENCOUNTER (OUTPATIENT)
Dept: GENERAL RADIOLOGY | Age: 87
Discharge: HOME | End: 2022-03-03
Payer: MEDICARE

## 2022-01-01 ENCOUNTER — APPOINTMENT (OUTPATIENT)
Dept: GENERAL RADIOLOGY | Age: 87
DRG: 194 | End: 2022-01-01
Payer: MEDICARE

## 2022-01-01 ENCOUNTER — HOSPITAL ENCOUNTER (OUTPATIENT)
Age: 87
Discharge: HOME | End: 2022-03-03
Payer: MEDICARE

## 2022-01-01 VITALS
OXYGEN SATURATION: 95 % | TEMPERATURE: 97.7 F | RESPIRATION RATE: 16 BRPM | DIASTOLIC BLOOD PRESSURE: 73 MMHG | BODY MASS INDEX: 21.22 KG/M2 | WEIGHT: 91.7 LBS | HEIGHT: 55 IN | SYSTOLIC BLOOD PRESSURE: 130 MMHG | HEART RATE: 82 BPM

## 2022-01-01 VITALS
DIASTOLIC BLOOD PRESSURE: 76 MMHG | HEIGHT: 55 IN | BODY MASS INDEX: 22.17 KG/M2 | OXYGEN SATURATION: 90 % | SYSTOLIC BLOOD PRESSURE: 145 MMHG | WEIGHT: 95.8 LBS | TEMPERATURE: 98.5 F | HEART RATE: 118 BPM | RESPIRATION RATE: 28 BRPM

## 2022-01-01 DIAGNOSIS — J18.9 PNEUMONIA OF RIGHT UPPER LOBE DUE TO INFECTIOUS ORGANISM: Primary | ICD-10-CM

## 2022-01-01 DIAGNOSIS — R06.02 SHORTNESS OF BREATH: ICD-10-CM

## 2022-01-01 DIAGNOSIS — J96.01 ACUTE RESPIRATORY FAILURE WITH HYPOXIA (HCC): ICD-10-CM

## 2022-01-01 DIAGNOSIS — J18.9 PNEUMONIA OF LEFT LUNG DUE TO INFECTIOUS ORGANISM, UNSPECIFIED PART OF LUNG: Primary | ICD-10-CM

## 2022-01-01 DIAGNOSIS — J15.9 COMMUNITY ACQUIRED BACTERIAL PNEUMONIA: ICD-10-CM

## 2022-01-01 DIAGNOSIS — D64.9 ANEMIA, UNSPECIFIED TYPE: ICD-10-CM

## 2022-01-01 DIAGNOSIS — J18.9 COMMUNITY ACQUIRED PNEUMONIA OF RIGHT UPPER LOBE OF LUNG: ICD-10-CM

## 2022-01-01 DIAGNOSIS — J90 PLEURAL EFFUSION: ICD-10-CM

## 2022-01-01 LAB
-: ABNORMAL
ABSOLUTE EOS #: 0 K/UL (ref 0–0.44)
ABSOLUTE EOS #: 0.03 K/UL (ref 0–0.44)
ABSOLUTE EOS #: 0.03 K/UL (ref 0–0.44)
ABSOLUTE EOS #: 0.06 K/UL (ref 0–0.44)
ABSOLUTE EOS #: 0.09 K/UL (ref 0–0.44)
ABSOLUTE EOS #: 0.14 K/UL (ref 0–0.44)
ABSOLUTE EOS #: 0.34 K/UL (ref 0–0.44)
ABSOLUTE IMMATURE GRANULOCYTE: 0 K/UL (ref 0–0.3)
ABSOLUTE IMMATURE GRANULOCYTE: 0.03 K/UL (ref 0–0.3)
ABSOLUTE IMMATURE GRANULOCYTE: 0.03 K/UL (ref 0–0.3)
ABSOLUTE IMMATURE GRANULOCYTE: 0.04 K/UL (ref 0–0.3)
ABSOLUTE IMMATURE GRANULOCYTE: 0.16 K/UL (ref 0–0.3)
ABSOLUTE IMMATURE GRANULOCYTE: 0.18 K/UL (ref 0–0.3)
ABSOLUTE IMMATURE GRANULOCYTE: <0.03 K/UL (ref 0–0.3)
ABSOLUTE LYMPH #: 1.38 K/UL (ref 1.1–3.7)
ABSOLUTE LYMPH #: 1.55 K/UL (ref 1.1–3.7)
ABSOLUTE LYMPH #: 1.62 K/UL (ref 1.1–3.7)
ABSOLUTE LYMPH #: 2.62 K/UL (ref 1.1–3.7)
ABSOLUTE LYMPH #: 2.93 K/UL (ref 1.1–3.7)
ABSOLUTE LYMPH #: 3.27 K/UL (ref 1.1–3.7)
ABSOLUTE LYMPH #: 3.46 K/UL (ref 1.1–3.7)
ABSOLUTE MONO #: 0.27 K/UL (ref 0.1–1.2)
ABSOLUTE MONO #: 0.72 K/UL (ref 0.1–1.2)
ABSOLUTE MONO #: 0.75 K/UL (ref 0.1–1.2)
ABSOLUTE MONO #: 0.77 K/UL (ref 0.1–1.2)
ABSOLUTE MONO #: 0.83 K/UL (ref 0.1–1.2)
ABSOLUTE MONO #: 0.84 K/UL (ref 0.1–1.2)
ABSOLUTE MONO #: 1.01 K/UL (ref 0.1–1.2)
ALBUMIN SERPL-MCNC: 2.9 G/DL (ref 3.5–5.2)
ALBUMIN SERPL-MCNC: 3.3 G/DL (ref 3.5–5.2)
ALBUMIN SERPL-MCNC: 3.7 G/DL (ref 3.5–5.2)
ALBUMIN SERPL-MCNC: 3.8 G/DL (ref 3.5–5.2)
ALBUMIN SERPL-MCNC: 4.3 G/DL (ref 3.5–5.2)
ALBUMIN/GLOBULIN RATIO: 0.9 (ref 1–2.5)
ALBUMIN/GLOBULIN RATIO: 1 (ref 1–2.5)
ALBUMIN/GLOBULIN RATIO: 1.1 (ref 1–2.5)
ALBUMIN/GLOBULIN RATIO: 1.1 (ref 1–2.5)
ALBUMIN/GLOBULIN RATIO: 1.2 (ref 1–2.5)
ALLEN TEST: ABNORMAL
ALP BLD-CCNC: 56 U/L (ref 35–104)
ALP BLD-CCNC: 56 U/L (ref 35–104)
ALP BLD-CCNC: 70 U/L (ref 35–104)
ALP BLD-CCNC: 83 U/L (ref 35–104)
ALP BLD-CCNC: 99 U/L (ref 35–104)
ALT SERPL-CCNC: 10 U/L (ref 5–33)
ALT SERPL-CCNC: 10 U/L (ref 5–33)
ALT SERPL-CCNC: 13 U/L (ref 5–33)
ALT SERPL-CCNC: 14 U/L (ref 5–33)
ALT SERPL-CCNC: 15 U/L (ref 5–33)
AMORPHOUS: ABNORMAL
ANION GAP SERPL CALCULATED.3IONS-SCNC: 12 MMOL/L (ref 9–17)
ANION GAP SERPL CALCULATED.3IONS-SCNC: 12 MMOL/L (ref 9–17)
ANION GAP SERPL CALCULATED.3IONS-SCNC: 13 MMOL/L (ref 9–17)
ANION GAP SERPL CALCULATED.3IONS-SCNC: 14 MMOL/L (ref 9–17)
ANION GAP SERPL CALCULATED.3IONS-SCNC: 15 MMOL/L (ref 9–17)
ANION GAP SERPL CALCULATED.3IONS-SCNC: 15 MMOL/L (ref 9–17)
ANION GAP SERPL CALCULATED.3IONS-SCNC: 20 MMOL/L (ref 9–17)
AST SERPL-CCNC: 19 U/L
AST SERPL-CCNC: 19 U/L
AST SERPL-CCNC: 23 U/L
AST SERPL-CCNC: 24 U/L
AST SERPL-CCNC: 30 U/L
BACTERIA: ABNORMAL
BASOPHILS # BLD: 0 % (ref 0–2)
BASOPHILS ABSOLUTE: 0 K/UL (ref 0–0.2)
BASOPHILS ABSOLUTE: 0.03 K/UL (ref 0–0.2)
BASOPHILS ABSOLUTE: 0.03 K/UL (ref 0–0.2)
BASOPHILS ABSOLUTE: 0.05 K/UL (ref 0–0.2)
BASOPHILS ABSOLUTE: <0.03 K/UL (ref 0–0.2)
BILIRUB SERPL-MCNC: 0.27 MG/DL (ref 0.3–1.2)
BILIRUB SERPL-MCNC: 0.35 MG/DL (ref 0.3–1.2)
BILIRUB SERPL-MCNC: 0.38 MG/DL (ref 0.3–1.2)
BILIRUB SERPL-MCNC: 0.39 MG/DL (ref 0.3–1.2)
BILIRUB SERPL-MCNC: 0.7 MG/DL (ref 0.3–1.2)
BILIRUBIN DIRECT: <0.08 MG/DL
BILIRUBIN DIRECT: <0.08 MG/DL
BILIRUBIN URINE: NEGATIVE
BILIRUBIN, INDIRECT: NORMAL MG/DL (ref 0–1)
BILIRUBIN, INDIRECT: NORMAL MG/DL (ref 0–1)
BNP INTERPRETATION: ABNORMAL
BUN BLDV-MCNC: 21 MG/DL (ref 8–23)
BUN BLDV-MCNC: 23 MG/DL (ref 8–23)
BUN BLDV-MCNC: 26 MG/DL (ref 8–23)
BUN BLDV-MCNC: 34 MG/DL (ref 8–23)
BUN BLDV-MCNC: 34 MG/DL (ref 8–23)
BUN BLDV-MCNC: 35 MG/DL (ref 8–23)
BUN BLDV-MCNC: 35 MG/DL (ref 8–23)
BUN/CREAT BLD: 20 (ref 9–20)
BUN/CREAT BLD: 21 (ref 9–20)
BUN/CREAT BLD: 27 (ref 9–20)
BUN/CREAT BLD: 27 (ref 9–20)
BUN/CREAT BLD: 30 (ref 9–20)
BUN/CREAT BLD: 31 (ref 9–20)
BUN/CREAT BLD: 32 (ref 9–20)
CALCIUM SERPL-MCNC: 8.1 MG/DL (ref 8.6–10.4)
CALCIUM SERPL-MCNC: 8.7 MG/DL (ref 8.6–10.4)
CALCIUM SERPL-MCNC: 8.9 MG/DL (ref 8.6–10.4)
CALCIUM SERPL-MCNC: 9.1 MG/DL (ref 8.6–10.4)
CALCIUM SERPL-MCNC: 9.3 MG/DL (ref 8.6–10.4)
CALCIUM SERPL-MCNC: 9.3 MG/DL (ref 8.6–10.4)
CALCIUM SERPL-MCNC: 9.9 MG/DL (ref 8.6–10.4)
CASTS UA: ABNORMAL /LPF
CHLORIDE BLD-SCNC: 101 MMOL/L (ref 98–107)
CHLORIDE BLD-SCNC: 102 MMOL/L (ref 98–107)
CHLORIDE BLD-SCNC: 103 MMOL/L (ref 98–107)
CHLORIDE BLD-SCNC: 105 MMOL/L (ref 98–107)
CHLORIDE BLD-SCNC: 106 MMOL/L (ref 98–107)
CHLORIDE BLD-SCNC: 96 MMOL/L (ref 98–107)
CHLORIDE BLD-SCNC: 98 MMOL/L (ref 98–107)
CO2: 18 MMOL/L (ref 20–31)
CO2: 19 MMOL/L (ref 20–31)
CO2: 19 MMOL/L (ref 20–31)
CO2: 20 MMOL/L (ref 20–31)
CO2: 22 MMOL/L (ref 20–31)
CO2: 22 MMOL/L (ref 20–31)
CO2: 23 MMOL/L (ref 20–31)
COLOR: YELLOW
COMMENT UA: ABNORMAL
CREAT SERPL-MCNC: 0.77 MG/DL (ref 0.5–0.9)
CREAT SERPL-MCNC: 1.09 MG/DL (ref 0.5–0.9)
CREAT SERPL-MCNC: 1.1 MG/DL (ref 0.5–0.9)
CREAT SERPL-MCNC: 1.11 MG/DL (ref 0.5–0.9)
CREAT SERPL-MCNC: 1.12 MG/DL (ref 0.5–0.9)
CREAT SERPL-MCNC: 1.28 MG/DL (ref 0.5–0.9)
CREAT SERPL-MCNC: 1.29 MG/DL (ref 0.5–0.9)
CRYSTALS, UA: ABNORMAL /HPF
CULTURE: ABNORMAL
CULTURE: NORMAL
CULTURE: NORMAL
DIFFERENTIAL TYPE: ABNORMAL
DIRECT EXAM: NORMAL
EKG ATRIAL RATE: 131 BPM
EKG ATRIAL RATE: 73 BPM
EKG ATRIAL RATE: 74 BPM
EKG ATRIAL RATE: 94 BPM
EKG P AXIS: 29 DEGREES
EKG P AXIS: 40 DEGREES
EKG P-R INTERVAL: 120 MS
EKG P-R INTERVAL: 120 MS
EKG P-R INTERVAL: 154 MS
EKG Q-T INTERVAL: 260 MS
EKG Q-T INTERVAL: 348 MS
EKG Q-T INTERVAL: 366 MS
EKG Q-T INTERVAL: 368 MS
EKG QRS DURATION: 66 MS
EKG QRS DURATION: 70 MS
EKG QTC CALCULATION (BAZETT): 401 MS
EKG QTC CALCULATION (BAZETT): 403 MS
EKG QTC CALCULATION (BAZETT): 408 MS
EKG QTC CALCULATION (BAZETT): 435 MS
EKG R AXIS: -24 DEGREES
EKG R AXIS: -6 DEGREES
EKG R AXIS: 11 DEGREES
EKG T AXIS: 146 DEGREES
EKG T AXIS: 26 DEGREES
EKG T AXIS: 37 DEGREES
EKG T AXIS: 42 DEGREES
EKG VENTRICULAR RATE: 143 BPM
EKG VENTRICULAR RATE: 73 BPM
EKG VENTRICULAR RATE: 74 BPM
EKG VENTRICULAR RATE: 94 BPM
EOSINOPHILS RELATIVE PERCENT: 0 % (ref 1–4)
EOSINOPHILS RELATIVE PERCENT: 1 % (ref 1–4)
EOSINOPHILS RELATIVE PERCENT: 1 % (ref 1–4)
EOSINOPHILS RELATIVE PERCENT: 2 % (ref 1–4)
EOSINOPHILS RELATIVE PERCENT: 5 % (ref 1–4)
EPITHELIAL CELLS UA: ABNORMAL /HPF (ref 0–25)
FIO2: 28
GFR AFRICAN AMERICAN: 47 ML/MIN
GFR AFRICAN AMERICAN: 47 ML/MIN
GFR AFRICAN AMERICAN: 55 ML/MIN
GFR AFRICAN AMERICAN: 55 ML/MIN
GFR AFRICAN AMERICAN: 56 ML/MIN
GFR AFRICAN AMERICAN: 57 ML/MIN
GFR AFRICAN AMERICAN: >60 ML/MIN
GFR NON-AFRICAN AMERICAN: 38 ML/MIN
GFR NON-AFRICAN AMERICAN: 39 ML/MIN
GFR NON-AFRICAN AMERICAN: 45 ML/MIN
GFR NON-AFRICAN AMERICAN: 46 ML/MIN
GFR NON-AFRICAN AMERICAN: 46 ML/MIN
GFR NON-AFRICAN AMERICAN: 47 ML/MIN
GFR NON-AFRICAN AMERICAN: >60 ML/MIN
GFR SERPL CREATININE-BSD FRML MDRD: ABNORMAL ML/MIN/{1.73_M2}
GLOBULIN: NORMAL G/DL (ref 1.5–3.8)
GLUCOSE BLD-MCNC: 107 MG/DL (ref 70–99)
GLUCOSE BLD-MCNC: 124 MG/DL (ref 70–99)
GLUCOSE BLD-MCNC: 129 MG/DL (ref 70–99)
GLUCOSE BLD-MCNC: 132 MG/DL (ref 70–99)
GLUCOSE BLD-MCNC: 80 MG/DL (ref 70–99)
GLUCOSE BLD-MCNC: 93 MG/DL (ref 70–99)
GLUCOSE BLD-MCNC: 95 MG/DL (ref 70–99)
GLUCOSE URINE: NEGATIVE
HCO3 ARTERIAL: 18.3 MMOL/L (ref 22–26)
HCO3 VENOUS: 20.8 MMOL/L (ref 24–30)
HCT VFR BLD CALC: 30.8 % (ref 36.3–47.1)
HCT VFR BLD CALC: 31.1 % (ref 36.3–47.1)
HCT VFR BLD CALC: 32.8 % (ref 36.3–47.1)
HCT VFR BLD CALC: 32.9 % (ref 36.3–47.1)
HCT VFR BLD CALC: 34 % (ref 36.3–47.1)
HCT VFR BLD CALC: 37.1 % (ref 36.3–47.1)
HCT VFR BLD CALC: 39.7 % (ref 36.3–47.1)
HEMOGLOBIN: 10.4 G/DL (ref 11.9–15.1)
HEMOGLOBIN: 10.7 G/DL (ref 11.9–15.1)
HEMOGLOBIN: 11.1 G/DL (ref 11.9–15.1)
HEMOGLOBIN: 11.2 G/DL (ref 11.9–15.1)
HEMOGLOBIN: 12.1 G/DL (ref 11.9–15.1)
HEMOGLOBIN: 13.2 G/DL (ref 11.9–15.1)
HEMOGLOBIN: 9.8 G/DL (ref 11.9–15.1)
IMMATURE GRANULOCYTES: 0 %
IMMATURE GRANULOCYTES: 1 %
IMMATURE GRANULOCYTES: 1 %
KETONES, URINE: NEGATIVE
LACTIC ACID, SEPSIS: 1.7 MMOL/L (ref 0.5–1.9)
LACTIC ACID, WHOLE BLOOD: NORMAL MMOL/L (ref 0.7–2.1)
LACTIC ACID: 2 MMOL/L (ref 0.5–2.2)
LEUKOCYTE ESTERASE, URINE: NEGATIVE
LV EF: 65 %
LVEF MODALITY: NORMAL
LYMPHOCYTES # BLD: 11 % (ref 24–43)
LYMPHOCYTES # BLD: 12 % (ref 24–43)
LYMPHOCYTES # BLD: 13 % (ref 24–43)
LYMPHOCYTES # BLD: 34 % (ref 24–43)
LYMPHOCYTES # BLD: 36 % (ref 24–43)
LYMPHOCYTES # BLD: 44 % (ref 24–43)
LYMPHOCYTES # BLD: 8 % (ref 24–43)
Lab: ABNORMAL
Lab: ABNORMAL
Lab: NORMAL
Lab: NORMAL
MAGNESIUM: 0.9 MG/DL (ref 1.6–2.6)
MCH RBC QN AUTO: 30.3 PG (ref 25.2–33.5)
MCH RBC QN AUTO: 30.6 PG (ref 25.2–33.5)
MCH RBC QN AUTO: 30.7 PG (ref 25.2–33.5)
MCH RBC QN AUTO: 30.8 PG (ref 25.2–33.5)
MCH RBC QN AUTO: 31.2 PG (ref 25.2–33.5)
MCH RBC QN AUTO: 31.4 PG (ref 25.2–33.5)
MCH RBC QN AUTO: 31.6 PG (ref 25.2–33.5)
MCHC RBC AUTO-ENTMCNC: 31.8 G/DL (ref 28.4–34.8)
MCHC RBC AUTO-ENTMCNC: 32.6 G/DL (ref 28.4–34.8)
MCHC RBC AUTO-ENTMCNC: 33.2 G/DL (ref 28.4–34.8)
MCHC RBC AUTO-ENTMCNC: 33.4 G/DL (ref 28.4–34.8)
MCHC RBC AUTO-ENTMCNC: 34 G/DL (ref 28.4–34.8)
MCV RBC AUTO: 91.5 FL (ref 82.6–102.9)
MCV RBC AUTO: 92.9 FL (ref 82.6–102.9)
MCV RBC AUTO: 94 FL (ref 82.6–102.9)
MCV RBC AUTO: 94.3 FL (ref 82.6–102.9)
MCV RBC AUTO: 94.4 FL (ref 82.6–102.9)
MCV RBC AUTO: 95.4 FL (ref 82.6–102.9)
MCV RBC AUTO: 95.6 FL (ref 82.6–102.9)
MONOCYTES # BLD: 1 % (ref 3–12)
MONOCYTES # BLD: 10 % (ref 3–12)
MONOCYTES # BLD: 11 % (ref 3–12)
MONOCYTES # BLD: 4 % (ref 3–12)
MONOCYTES # BLD: 6 % (ref 3–12)
MONOCYTES # BLD: 7 % (ref 3–12)
MONOCYTES # BLD: 9 % (ref 3–12)
MORPHOLOGY: NORMAL
MUCUS: ABNORMAL
NEGATIVE BASE EXCESS, ART: 4.9 MMOL/L (ref 0–2)
NEGATIVE BASE EXCESS, VEN: 3.4 MMOL/L (ref 0–2)
NITRITE, URINE: NEGATIVE
NRBC AUTOMATED: 0 PER 100 WBC
O2 DEVICE/FLOW/%: ABNORMAL
O2 SAT, ARTERIAL: 94.5 % (ref 95–98)
O2 SAT, VEN: 68.5 % (ref 60–85)
OTHER OBSERVATIONS UA: ABNORMAL
PATIENT TEMP: 37
PATIENT TEMP: 37
PCO2 ARTERIAL: 29.4 MMHG (ref 35–45)
PCO2, VEN: 35.2 (ref 39–55)
PDW BLD-RTO: 12.7 % (ref 11.8–14.4)
PDW BLD-RTO: 12.8 % (ref 11.8–14.4)
PDW BLD-RTO: 12.8 % (ref 11.8–14.4)
PDW BLD-RTO: 12.9 % (ref 11.8–14.4)
PDW BLD-RTO: 14.2 % (ref 11.8–14.4)
PDW BLD-RTO: 14.2 % (ref 11.8–14.4)
PDW BLD-RTO: 14.5 % (ref 11.8–14.4)
PH ARTERIAL: 7.41 (ref 7.35–7.45)
PH UA: 7 (ref 5–9)
PH VENOUS: 7.39 (ref 7.32–7.42)
PLATELET # BLD: 194 K/UL (ref 138–453)
PLATELET # BLD: 246 K/UL (ref 138–453)
PLATELET # BLD: 266 K/UL (ref 138–453)
PLATELET # BLD: 291 K/UL (ref 138–453)
PLATELET # BLD: 303 K/UL (ref 138–453)
PLATELET # BLD: 308 K/UL (ref 138–453)
PLATELET # BLD: 331 K/UL (ref 138–453)
PLATELET ESTIMATE: ABNORMAL
PMV BLD AUTO: 10.3 FL (ref 8.1–13.5)
PMV BLD AUTO: 10.4 FL (ref 8.1–13.5)
PMV BLD AUTO: 10.6 FL (ref 8.1–13.5)
PMV BLD AUTO: 10.7 FL (ref 8.1–13.5)
PMV BLD AUTO: 10.7 FL (ref 8.1–13.5)
PMV BLD AUTO: 11.4 FL (ref 8.1–13.5)
PMV BLD AUTO: 9.7 FL (ref 8.1–13.5)
PO2 ARTERIAL: 69.7 MMHG (ref 80–100)
PO2, VEN: 35.8 (ref 30–50)
POTASSIUM SERPL-SCNC: 3.4 MMOL/L (ref 3.7–5.3)
POTASSIUM SERPL-SCNC: 3.5 MMOL/L (ref 3.7–5.3)
POTASSIUM SERPL-SCNC: 3.8 MMOL/L (ref 3.7–5.3)
POTASSIUM SERPL-SCNC: 3.9 MMOL/L (ref 3.7–5.3)
POTASSIUM SERPL-SCNC: 4.4 MMOL/L (ref 3.7–5.3)
POTASSIUM SERPL-SCNC: 4.7 MMOL/L (ref 3.7–5.3)
POTASSIUM SERPL-SCNC: 5.2 MMOL/L (ref 3.7–5.3)
PRO-BNP: 6063 PG/ML
PRO-BNP: 961 PG/ML
PROTEIN UA: NEGATIVE
PT. POSITION: ABNORMAL
RBC # BLD: 3.23 M/UL (ref 3.95–5.11)
RBC # BLD: 3.4 M/UL (ref 3.95–5.11)
RBC # BLD: 3.49 M/UL (ref 3.95–5.11)
RBC # BLD: 3.54 M/UL (ref 3.95–5.11)
RBC # BLD: 3.6 M/UL (ref 3.95–5.11)
RBC # BLD: 3.88 M/UL (ref 3.95–5.11)
RBC # BLD: 4.21 M/UL (ref 3.95–5.11)
RBC # BLD: ABNORMAL 10*6/UL
RBC UA: ABNORMAL /HPF (ref 0–2)
RENAL EPITHELIAL, UA: ABNORMAL /HPF
SAMPLE SITE: ABNORMAL
SARS-COV-2, RAPID: NOT DETECTED
SEG NEUTROPHILS: 41 % (ref 36–65)
SEG NEUTROPHILS: 51 % (ref 36–65)
SEG NEUTROPHILS: 57 % (ref 36–65)
SEG NEUTROPHILS: 80 % (ref 36–65)
SEG NEUTROPHILS: 82 % (ref 36–65)
SEG NEUTROPHILS: 86 % (ref 36–65)
SEG NEUTROPHILS: 86 % (ref 36–65)
SEGMENTED NEUTROPHILS ABSOLUTE COUNT: 12.58 K/UL (ref 1.5–8.1)
SEGMENTED NEUTROPHILS ABSOLUTE COUNT: 16.5 K/UL (ref 1.5–8.1)
SEGMENTED NEUTROPHILS ABSOLUTE COUNT: 22.87 K/UL (ref 1.5–8.1)
SEGMENTED NEUTROPHILS ABSOLUTE COUNT: 3.08 K/UL (ref 1.5–8.1)
SEGMENTED NEUTROPHILS ABSOLUTE COUNT: 3.65 K/UL (ref 1.5–8.1)
SEGMENTED NEUTROPHILS ABSOLUTE COUNT: 4.94 K/UL (ref 1.5–8.1)
SEGMENTED NEUTROPHILS ABSOLUTE COUNT: 9.28 K/UL (ref 1.5–8.1)
SODIUM BLD-SCNC: 135 MMOL/L (ref 135–144)
SODIUM BLD-SCNC: 136 MMOL/L (ref 135–144)
SODIUM BLD-SCNC: 137 MMOL/L (ref 135–144)
SODIUM BLD-SCNC: 137 MMOL/L (ref 135–144)
SODIUM BLD-SCNC: 138 MMOL/L (ref 135–144)
SPECIFIC GRAVITY UA: 1.01 (ref 1.01–1.02)
SPECIMEN DESCRIPTION: ABNORMAL
SPECIMEN DESCRIPTION: ABNORMAL
SPECIMEN DESCRIPTION: NORMAL
TOTAL PROTEIN: 6.3 G/DL (ref 6.4–8.3)
TOTAL PROTEIN: 6.7 G/DL (ref 6.4–8.3)
TOTAL PROTEIN: 7 G/DL (ref 6.4–8.3)
TOTAL PROTEIN: 7.1 G/DL (ref 6.4–8.3)
TOTAL PROTEIN: 8.3 G/DL (ref 6.4–8.3)
TRICHOMONAS: ABNORMAL
TROPONIN INTERP: ABNORMAL
TROPONIN T: ABNORMAL NG/ML
TROPONIN, HIGH SENSITIVITY: 24 NG/L (ref 0–14)
TROPONIN, HIGH SENSITIVITY: 38 NG/L (ref 0–14)
TROPONIN, HIGH SENSITIVITY: 43 NG/L (ref 0–14)
TROPONIN, HIGH SENSITIVITY: 52 NG/L (ref 0–14)
TURBIDITY: CLEAR
URINE HGB: ABNORMAL
UROBILINOGEN, URINE: NORMAL
WBC # BLD: 11.5 K/UL (ref 3.5–11.3)
WBC # BLD: 15.5 K/UL (ref 3.5–11.3)
WBC # BLD: 19.2 K/UL (ref 3.5–11.3)
WBC # BLD: 26.6 K/UL (ref 3.5–11.3)
WBC # BLD: 7.3 K/UL (ref 3.5–11.3)
WBC # BLD: 7.5 K/UL (ref 3.5–11.3)
WBC # BLD: 8.7 K/UL (ref 3.5–11.3)
WBC # BLD: ABNORMAL 10*3/UL
WBC UA: ABNORMAL /HPF (ref 0–5)
YEAST: ABNORMAL

## 2022-01-01 PROCEDURE — 6370000000 HC RX 637 (ALT 250 FOR IP): Performed by: INTERNAL MEDICINE

## 2022-01-01 PROCEDURE — 6360000002 HC RX W HCPCS: Performed by: EMERGENCY MEDICINE

## 2022-01-01 PROCEDURE — 6370000000 HC RX 637 (ALT 250 FOR IP): Performed by: FAMILY MEDICINE

## 2022-01-01 PROCEDURE — 84484 ASSAY OF TROPONIN QUANT: CPT

## 2022-01-01 PROCEDURE — 2580000003 HC RX 258: Performed by: NURSE PRACTITIONER

## 2022-01-01 PROCEDURE — 2580000003 HC RX 258: Performed by: FAMILY MEDICINE

## 2022-01-01 PROCEDURE — 93306 TTE W/DOPPLER COMPLETE: CPT

## 2022-01-01 PROCEDURE — 1200000000 HC SEMI PRIVATE

## 2022-01-01 PROCEDURE — 94640 AIRWAY INHALATION TREATMENT: CPT

## 2022-01-01 PROCEDURE — 94664 DEMO&/EVAL PT USE INHALER: CPT

## 2022-01-01 PROCEDURE — 94660 CPAP INITIATION&MGMT: CPT

## 2022-01-01 PROCEDURE — 96361 HYDRATE IV INFUSION ADD-ON: CPT

## 2022-01-01 PROCEDURE — 6360000002 HC RX W HCPCS: Performed by: INTERNAL MEDICINE

## 2022-01-01 PROCEDURE — 94761 N-INVAS EAR/PLS OXIMETRY MLT: CPT

## 2022-01-01 PROCEDURE — 6360000002 HC RX W HCPCS: Performed by: NURSE PRACTITIONER

## 2022-01-01 PROCEDURE — 2700000000 HC OXYGEN THERAPY PER DAY

## 2022-01-01 PROCEDURE — 36415 COLL VENOUS BLD VENIPUNCTURE: CPT

## 2022-01-01 PROCEDURE — 93010 ELECTROCARDIOGRAM REPORT: CPT | Performed by: INTERNAL MEDICINE

## 2022-01-01 PROCEDURE — 87150 DNA/RNA AMPLIFIED PROBE: CPT

## 2022-01-01 PROCEDURE — 70450 CT HEAD/BRAIN W/O DYE: CPT

## 2022-01-01 PROCEDURE — 83605 ASSAY OF LACTIC ACID: CPT

## 2022-01-01 PROCEDURE — 71045 X-RAY EXAM CHEST 1 VIEW: CPT

## 2022-01-01 PROCEDURE — 2000000000 HC ICU R&B

## 2022-01-01 PROCEDURE — 83735 ASSAY OF MAGNESIUM: CPT

## 2022-01-01 PROCEDURE — 6360000002 HC RX W HCPCS: Performed by: FAMILY MEDICINE

## 2022-01-01 PROCEDURE — 96365 THER/PROPH/DIAG IV INF INIT: CPT

## 2022-01-01 PROCEDURE — 6360000004 HC RX CONTRAST MEDICATION: Performed by: EMERGENCY MEDICINE

## 2022-01-01 PROCEDURE — 6370000000 HC RX 637 (ALT 250 FOR IP): Performed by: EMERGENCY MEDICINE

## 2022-01-01 PROCEDURE — 93005 ELECTROCARDIOGRAM TRACING: CPT | Performed by: NURSE PRACTITIONER

## 2022-01-01 PROCEDURE — 87086 URINE CULTURE/COLONY COUNT: CPT

## 2022-01-01 PROCEDURE — 82805 BLOOD GASES W/O2 SATURATION: CPT

## 2022-01-01 PROCEDURE — 87040 BLOOD CULTURE FOR BACTERIA: CPT

## 2022-01-01 PROCEDURE — 6370000000 HC RX 637 (ALT 250 FOR IP): Performed by: NURSE PRACTITIONER

## 2022-01-01 PROCEDURE — 97162 PT EVAL MOD COMPLEX 30 MIN: CPT

## 2022-01-01 PROCEDURE — 80048 BASIC METABOLIC PNL TOTAL CA: CPT

## 2022-01-01 PROCEDURE — 99285 EMERGENCY DEPT VISIT HI MDM: CPT

## 2022-01-01 PROCEDURE — 71046 X-RAY EXAM CHEST 2 VIEWS: CPT

## 2022-01-01 PROCEDURE — 51702 INSERT TEMP BLADDER CATH: CPT

## 2022-01-01 PROCEDURE — 87205 SMEAR GRAM STAIN: CPT

## 2022-01-01 PROCEDURE — 87635 SARS-COV-2 COVID-19 AMP PRB: CPT

## 2022-01-01 PROCEDURE — 2580000003 HC RX 258: Performed by: INTERNAL MEDICINE

## 2022-01-01 PROCEDURE — 2580000003 HC RX 258: Performed by: EMERGENCY MEDICINE

## 2022-01-01 PROCEDURE — 80076 HEPATIC FUNCTION PANEL: CPT

## 2022-01-01 PROCEDURE — 85025 COMPLETE CBC W/AUTO DIFF WBC: CPT

## 2022-01-01 PROCEDURE — C9803 HOPD COVID-19 SPEC COLLECT: HCPCS

## 2022-01-01 PROCEDURE — 80053 COMPREHEN METABOLIC PANEL: CPT

## 2022-01-01 PROCEDURE — 93005 ELECTROCARDIOGRAM TRACING: CPT | Performed by: EMERGENCY MEDICINE

## 2022-01-01 PROCEDURE — 2500000003 HC RX 250 WO HCPCS: Performed by: NURSE PRACTITIONER

## 2022-01-01 PROCEDURE — 92610 EVALUATE SWALLOWING FUNCTION: CPT

## 2022-01-01 PROCEDURE — 99221 1ST HOSP IP/OBS SF/LOW 40: CPT | Performed by: INTERNAL MEDICINE

## 2022-01-01 PROCEDURE — 87804 INFLUENZA ASSAY W/OPTIC: CPT

## 2022-01-01 PROCEDURE — 36600 WITHDRAWAL OF ARTERIAL BLOOD: CPT

## 2022-01-01 PROCEDURE — 83880 ASSAY OF NATRIURETIC PEPTIDE: CPT

## 2022-01-01 PROCEDURE — 74177 CT ABD & PELVIS W/CONTRAST: CPT

## 2022-01-01 PROCEDURE — 81001 URINALYSIS AUTO W/SCOPE: CPT

## 2022-01-01 PROCEDURE — 99282 EMERGENCY DEPT VISIT SF MDM: CPT

## 2022-01-01 RX ORDER — ACETAMINOPHEN 650 MG/1
650 SUPPOSITORY RECTAL EVERY 6 HOURS PRN
Status: DISCONTINUED | OUTPATIENT
Start: 2022-01-01 | End: 2022-01-01 | Stop reason: HOSPADM

## 2022-01-01 RX ORDER — AZITHROMYCIN 250 MG/1
500 TABLET, FILM COATED ORAL EVERY 24 HOURS
Status: DISCONTINUED | OUTPATIENT
Start: 2022-01-01 | End: 2022-01-01

## 2022-01-01 RX ORDER — SODIUM CHLORIDE 9 MG/ML
INJECTION, SOLUTION INTRAVENOUS PRN
Status: DISCONTINUED | OUTPATIENT
Start: 2022-01-01 | End: 2022-01-01 | Stop reason: HOSPADM

## 2022-01-01 RX ORDER — LEVOTHYROXINE SODIUM 0.05 MG/1
50 TABLET ORAL DAILY
Status: DISCONTINUED | OUTPATIENT
Start: 2022-01-01 | End: 2022-01-01 | Stop reason: HOSPADM

## 2022-01-01 RX ORDER — ONDANSETRON 4 MG/1
4 TABLET, ORALLY DISINTEGRATING ORAL EVERY 8 HOURS PRN
Status: DISCONTINUED | OUTPATIENT
Start: 2022-01-01 | End: 2022-01-01 | Stop reason: HOSPADM

## 2022-01-01 RX ORDER — SODIUM CHLORIDE 0.9 % (FLUSH) 0.9 %
5-40 SYRINGE (ML) INJECTION EVERY 12 HOURS SCHEDULED
Status: DISCONTINUED | OUTPATIENT
Start: 2022-01-01 | End: 2022-01-01 | Stop reason: HOSPADM

## 2022-01-01 RX ORDER — IPRATROPIUM BROMIDE AND ALBUTEROL SULFATE 2.5; .5 MG/3ML; MG/3ML
1 SOLUTION RESPIRATORY (INHALATION) 4 TIMES DAILY
Status: DISCONTINUED | OUTPATIENT
Start: 2022-01-01 | End: 2022-01-01 | Stop reason: HOSPADM

## 2022-01-01 RX ORDER — MAGNESIUM SULFATE 1 G/100ML
1000 INJECTION INTRAVENOUS PRN
Status: DISCONTINUED | OUTPATIENT
Start: 2022-01-01 | End: 2022-01-01 | Stop reason: HOSPADM

## 2022-01-01 RX ORDER — AMIODARONE HYDROCHLORIDE 200 MG/1
TABLET ORAL
Qty: 120 TABLET | Refills: 2 | Status: SHIPPED | OUTPATIENT
Start: 2022-01-01 | End: 2022-07-21

## 2022-01-01 RX ORDER — DOXYCYCLINE HYCLATE 100 MG/1
100 CAPSULE ORAL EVERY 12 HOURS SCHEDULED
Qty: 20 CAPSULE | Refills: 0 | Status: SHIPPED | OUTPATIENT
Start: 2022-01-01 | End: 2022-01-01

## 2022-01-01 RX ORDER — POLYETHYLENE GLYCOL 3350 17 G/17G
17 POWDER, FOR SOLUTION ORAL DAILY PRN
Status: DISCONTINUED | OUTPATIENT
Start: 2022-01-01 | End: 2022-01-01 | Stop reason: HOSPADM

## 2022-01-01 RX ORDER — ACETAMINOPHEN 325 MG/1
650 TABLET ORAL EVERY 6 HOURS PRN
Status: DISCONTINUED | OUTPATIENT
Start: 2022-01-01 | End: 2022-01-01 | Stop reason: HOSPADM

## 2022-01-01 RX ORDER — METOPROLOL SUCCINATE 100 MG/1
100 TABLET, EXTENDED RELEASE ORAL DAILY
Status: DISCONTINUED | OUTPATIENT
Start: 2022-01-01 | End: 2022-01-01 | Stop reason: HOSPADM

## 2022-01-01 RX ORDER — LISINOPRIL 10 MG/1
10 TABLET ORAL EVERY 12 HOURS SCHEDULED
Status: DISCONTINUED | OUTPATIENT
Start: 2022-01-01 | End: 2022-01-01 | Stop reason: HOSPADM

## 2022-01-01 RX ORDER — MONTELUKAST SODIUM 10 MG/1
10 TABLET ORAL NIGHTLY
Status: DISCONTINUED | OUTPATIENT
Start: 2022-01-01 | End: 2022-01-01 | Stop reason: HOSPADM

## 2022-01-01 RX ORDER — POTASSIUM CHLORIDE 20 MEQ/1
40 TABLET, EXTENDED RELEASE ORAL ONCE
Status: COMPLETED | OUTPATIENT
Start: 2022-01-01 | End: 2022-01-01

## 2022-01-01 RX ORDER — AMIODARONE HYDROCHLORIDE 200 MG/1
200 TABLET ORAL 2 TIMES DAILY
Status: DISCONTINUED | OUTPATIENT
Start: 2022-01-01 | End: 2022-01-01 | Stop reason: HOSPADM

## 2022-01-01 RX ORDER — AMLODIPINE BESYLATE 5 MG/1
5 TABLET ORAL DAILY
Status: DISCONTINUED | OUTPATIENT
Start: 2022-01-01 | End: 2022-01-01 | Stop reason: HOSPADM

## 2022-01-01 RX ORDER — LANOLIN ALCOHOL/MO/W.PET/CERES
500 CREAM (GRAM) TOPICAL NIGHTLY
Status: DISCONTINUED | OUTPATIENT
Start: 2022-01-01 | End: 2022-01-01 | Stop reason: HOSPADM

## 2022-01-01 RX ORDER — PANTOPRAZOLE SODIUM 40 MG/1
40 TABLET, DELAYED RELEASE ORAL
Status: DISCONTINUED | OUTPATIENT
Start: 2022-01-01 | End: 2022-01-01 | Stop reason: HOSPADM

## 2022-01-01 RX ORDER — IPRATROPIUM BROMIDE AND ALBUTEROL SULFATE 2.5; .5 MG/3ML; MG/3ML
1 SOLUTION RESPIRATORY (INHALATION) ONCE
Status: COMPLETED | OUTPATIENT
Start: 2022-01-01 | End: 2022-01-01

## 2022-01-01 RX ORDER — SODIUM CHLORIDE 9 MG/ML
1000 INJECTION, SOLUTION INTRAVENOUS CONTINUOUS
Status: DISCONTINUED | OUTPATIENT
Start: 2022-01-01 | End: 2022-01-01

## 2022-01-01 RX ORDER — SODIUM CHLORIDE 9 MG/ML
1000 INJECTION, SOLUTION INTRAVENOUS CONTINUOUS
Status: DISCONTINUED | OUTPATIENT
Start: 2022-01-01 | End: 2022-01-01 | Stop reason: HOSPADM

## 2022-01-01 RX ORDER — ONDANSETRON 2 MG/ML
4 INJECTION INTRAMUSCULAR; INTRAVENOUS EVERY 6 HOURS PRN
Status: DISCONTINUED | OUTPATIENT
Start: 2022-01-01 | End: 2022-01-01 | Stop reason: HOSPADM

## 2022-01-01 RX ORDER — AMLODIPINE BESYLATE 10 MG/1
5 TABLET ORAL DAILY
COMMUNITY
Start: 2022-01-01

## 2022-01-01 RX ORDER — HEPARIN SODIUM 5000 [USP'U]/ML
5000 INJECTION, SOLUTION INTRAVENOUS; SUBCUTANEOUS 2 TIMES DAILY
Status: DISCONTINUED | OUTPATIENT
Start: 2022-01-01 | End: 2022-01-01 | Stop reason: HOSPADM

## 2022-01-01 RX ORDER — IPRATROPIUM BROMIDE AND ALBUTEROL SULFATE 2.5; .5 MG/3ML; MG/3ML
1 SOLUTION RESPIRATORY (INHALATION)
Status: DISCONTINUED | OUTPATIENT
Start: 2022-01-01 | End: 2022-01-01

## 2022-01-01 RX ORDER — IPRATROPIUM BROMIDE AND ALBUTEROL SULFATE 2.5; .5 MG/3ML; MG/3ML
3 SOLUTION RESPIRATORY (INHALATION) EVERY 6 HOURS PRN
Qty: 360 ML | Refills: 1 | Status: SHIPPED | OUTPATIENT
Start: 2022-01-01

## 2022-01-01 RX ORDER — SODIUM CHLORIDE 0.9 % (FLUSH) 0.9 %
10 SYRINGE (ML) INJECTION PRN
Status: DISCONTINUED | OUTPATIENT
Start: 2022-01-01 | End: 2022-01-01 | Stop reason: HOSPADM

## 2022-01-01 RX ORDER — SODIUM CHLORIDE 9 MG/ML
25 INJECTION, SOLUTION INTRAVENOUS PRN
Status: DISCONTINUED | OUTPATIENT
Start: 2022-01-01 | End: 2022-01-01 | Stop reason: HOSPADM

## 2022-01-01 RX ORDER — LANOLIN ALCOHOL/MO/W.PET/CERES
1000 CREAM (GRAM) TOPICAL NIGHTLY
Status: DISCONTINUED | OUTPATIENT
Start: 2022-01-01 | End: 2022-01-01 | Stop reason: HOSPADM

## 2022-01-01 RX ORDER — SODIUM CHLORIDE 0.9 % (FLUSH) 0.9 %
5-40 SYRINGE (ML) INJECTION PRN
Status: DISCONTINUED | OUTPATIENT
Start: 2022-01-01 | End: 2022-01-01 | Stop reason: HOSPADM

## 2022-01-01 RX ORDER — LEVOTHYROXINE SODIUM 0.05 MG/1
50 TABLET ORAL DAILY
Qty: 30 TABLET | Refills: 3 | Status: SHIPPED | OUTPATIENT
Start: 2022-01-01

## 2022-01-01 RX ORDER — LEVOTHYROXINE SODIUM 0.07 MG/1
75 TABLET ORAL DAILY
Status: DISCONTINUED | OUTPATIENT
Start: 2022-01-01 | End: 2022-01-01

## 2022-01-01 RX ORDER — DOXYCYCLINE HYCLATE 100 MG/1
100 CAPSULE ORAL EVERY 12 HOURS SCHEDULED
Status: DISCONTINUED | OUTPATIENT
Start: 2022-01-01 | End: 2022-01-01 | Stop reason: HOSPADM

## 2022-01-01 RX ORDER — ACETAMINOPHEN 500 MG
500 TABLET ORAL ONCE
Status: COMPLETED | OUTPATIENT
Start: 2022-01-01 | End: 2022-01-01

## 2022-01-01 RX ORDER — LEVOFLOXACIN 500 MG/1
500 TABLET, FILM COATED ORAL DAILY
Qty: 10 TABLET | Refills: 0 | Status: SHIPPED | OUTPATIENT
Start: 2022-01-01 | End: 2022-01-01

## 2022-01-01 RX ORDER — MORPHINE SULFATE 2 MG/ML
2 INJECTION, SOLUTION INTRAMUSCULAR; INTRAVENOUS EVERY 4 HOURS PRN
Status: DISCONTINUED | OUTPATIENT
Start: 2022-01-01 | End: 2022-01-01 | Stop reason: HOSPADM

## 2022-01-01 RX ADMIN — ACETAMINOPHEN 500 MG: 500 TABLET ORAL at 12:21

## 2022-01-01 RX ADMIN — AMLODIPINE BESYLATE 5 MG: 5 TABLET ORAL at 09:03

## 2022-01-01 RX ADMIN — SODIUM CHLORIDE, PRESERVATIVE FREE 10 ML: 5 INJECTION INTRAVENOUS at 20:48

## 2022-01-01 RX ADMIN — IPRATROPIUM BROMIDE AND ALBUTEROL SULFATE 1 AMPULE: 2.5; .5 SOLUTION RESPIRATORY (INHALATION) at 20:52

## 2022-01-01 RX ADMIN — CEFTRIAXONE 1000 MG: 1 INJECTION, POWDER, FOR SOLUTION INTRAMUSCULAR; INTRAVENOUS at 16:03

## 2022-01-01 RX ADMIN — AZITHROMYCIN DIHYDRATE 500 MG: 500 INJECTION, POWDER, LYOPHILIZED, FOR SOLUTION INTRAVENOUS at 15:48

## 2022-01-01 RX ADMIN — SODIUM CHLORIDE 1000 ML: 9 INJECTION, SOLUTION INTRAVENOUS at 14:43

## 2022-01-01 RX ADMIN — SODIUM CHLORIDE, PRESERVATIVE FREE 10 ML: 5 INJECTION INTRAVENOUS at 08:25

## 2022-01-01 RX ADMIN — LISINOPRIL 10 MG: 10 TABLET ORAL at 08:25

## 2022-01-01 RX ADMIN — IPRATROPIUM BROMIDE AND ALBUTEROL SULFATE 1 AMPULE: 2.5; .5 SOLUTION RESPIRATORY (INHALATION) at 06:04

## 2022-01-01 RX ADMIN — MONTELUKAST SODIUM 10 MG: 10 TABLET, FILM COATED ORAL at 20:47

## 2022-01-01 RX ADMIN — AMIODARONE HYDROCHLORIDE 1 MG/MIN: 50 INJECTION, SOLUTION INTRAVENOUS at 17:34

## 2022-01-01 RX ADMIN — IPRATROPIUM BROMIDE AND ALBUTEROL SULFATE 1 AMPULE: 2.5; .5 SOLUTION RESPIRATORY (INHALATION) at 10:55

## 2022-01-01 RX ADMIN — AZITHROMYCIN MONOHYDRATE 500 MG: 500 INJECTION, POWDER, LYOPHILIZED, FOR SOLUTION INTRAVENOUS at 13:41

## 2022-01-01 RX ADMIN — CEFTRIAXONE 1000 MG: 1 INJECTION, POWDER, FOR SOLUTION INTRAMUSCULAR; INTRAVENOUS at 15:15

## 2022-01-01 RX ADMIN — SODIUM CHLORIDE, PRESERVATIVE FREE 10 ML: 5 INJECTION INTRAVENOUS at 13:38

## 2022-01-01 RX ADMIN — IOPAMIDOL 75 ML: 755 INJECTION, SOLUTION INTRAVENOUS at 11:45

## 2022-01-01 RX ADMIN — HEPARIN SODIUM 5000 UNITS: 5000 INJECTION INTRAVENOUS; SUBCUTANEOUS at 21:00

## 2022-01-01 RX ADMIN — IPRATROPIUM BROMIDE AND ALBUTEROL SULFATE 1 AMPULE: 2.5; .5 SOLUTION RESPIRATORY (INHALATION) at 20:07

## 2022-01-01 RX ADMIN — IPRATROPIUM BROMIDE AND ALBUTEROL SULFATE 1 AMPULE: 2.5; .5 SOLUTION RESPIRATORY (INHALATION) at 21:26

## 2022-01-01 RX ADMIN — CEFTRIAXONE 1000 MG: 1 INJECTION, POWDER, FOR SOLUTION INTRAMUSCULAR; INTRAVENOUS at 12:53

## 2022-01-01 RX ADMIN — METOPROLOL SUCCINATE 100 MG: 100 TABLET, EXTENDED RELEASE ORAL at 08:25

## 2022-01-01 RX ADMIN — IPRATROPIUM BROMIDE AND ALBUTEROL SULFATE 1 AMPULE: 2.5; .5 SOLUTION RESPIRATORY (INHALATION) at 05:24

## 2022-01-01 RX ADMIN — SODIUM CHLORIDE, PRESERVATIVE FREE 10 ML: 5 INJECTION INTRAVENOUS at 21:52

## 2022-01-01 RX ADMIN — ENOXAPARIN SODIUM 30 MG: 100 INJECTION SUBCUTANEOUS at 17:01

## 2022-01-01 RX ADMIN — SODIUM CHLORIDE, PRESERVATIVE FREE 10 ML: 5 INJECTION INTRAVENOUS at 20:11

## 2022-01-01 RX ADMIN — MORPHINE SULFATE 2 MG: 2 INJECTION, SOLUTION INTRAMUSCULAR; INTRAVENOUS at 05:14

## 2022-01-01 RX ADMIN — IPRATROPIUM BROMIDE AND ALBUTEROL SULFATE 1 AMPULE: 2.5; .5 SOLUTION RESPIRATORY (INHALATION) at 10:52

## 2022-01-01 RX ADMIN — PANTOPRAZOLE SODIUM 40 MG: 40 TABLET, DELAYED RELEASE ORAL at 08:25

## 2022-01-01 RX ADMIN — LEVOTHYROXINE SODIUM 50 MCG: 50 TABLET ORAL at 07:54

## 2022-01-01 RX ADMIN — IPRATROPIUM BROMIDE AND ALBUTEROL SULFATE 1 AMPULE: 2.5; .5 SOLUTION RESPIRATORY (INHALATION) at 05:07

## 2022-01-01 RX ADMIN — IPRATROPIUM BROMIDE AND ALBUTEROL SULFATE 1 AMPULE: 2.5; .5 SOLUTION RESPIRATORY (INHALATION) at 11:26

## 2022-01-01 RX ADMIN — IPRATROPIUM BROMIDE AND ALBUTEROL SULFATE 1 AMPULE: 2.5; .5 SOLUTION RESPIRATORY (INHALATION) at 15:38

## 2022-01-01 RX ADMIN — IPRATROPIUM BROMIDE AND ALBUTEROL SULFATE 1 AMPULE: 2.5; .5 SOLUTION RESPIRATORY (INHALATION) at 19:42

## 2022-01-01 RX ADMIN — HEPARIN SODIUM 5000 UNITS: 5000 INJECTION INTRAVENOUS; SUBCUTANEOUS at 08:44

## 2022-01-01 RX ADMIN — PANTOPRAZOLE SODIUM 40 MG: 40 TABLET, DELAYED RELEASE ORAL at 07:54

## 2022-01-01 RX ADMIN — CEFTRIAXONE SODIUM 1000 MG: 1 INJECTION, POWDER, FOR SOLUTION INTRAMUSCULAR; INTRAVENOUS at 15:54

## 2022-01-01 RX ADMIN — IPRATROPIUM BROMIDE AND ALBUTEROL SULFATE 1 AMPULE: 2.5; .5 SOLUTION RESPIRATORY (INHALATION) at 10:30

## 2022-01-01 RX ADMIN — MAGNESIUM SULFATE HEPTAHYDRATE 1000 MG: 1 INJECTION, SOLUTION INTRAVENOUS at 10:01

## 2022-01-01 RX ADMIN — LISINOPRIL 10 MG: 10 TABLET ORAL at 20:04

## 2022-01-01 RX ADMIN — Medication 0.5 MG/MIN: at 00:12

## 2022-01-01 RX ADMIN — SODIUM CHLORIDE, PRESERVATIVE FREE 10 ML: 5 INJECTION INTRAVENOUS at 08:44

## 2022-01-01 RX ADMIN — LISINOPRIL 10 MG: 10 TABLET ORAL at 21:38

## 2022-01-01 RX ADMIN — MONTELUKAST 10 MG: 10 TABLET, FILM COATED ORAL at 21:38

## 2022-01-01 RX ADMIN — CYANOCOBALAMIN TAB 1000 MCG 1000 MCG: 1000 TAB at 20:47

## 2022-01-01 RX ADMIN — ENOXAPARIN SODIUM 30 MG: 100 INJECTION SUBCUTANEOUS at 08:25

## 2022-01-01 RX ADMIN — IPRATROPIUM BROMIDE AND ALBUTEROL SULFATE 1 AMPULE: 2.5; .5 SOLUTION RESPIRATORY (INHALATION) at 16:21

## 2022-01-01 RX ADMIN — MORPHINE SULFATE 2 MG: 2 INJECTION, SOLUTION INTRAMUSCULAR; INTRAVENOUS at 12:10

## 2022-01-01 RX ADMIN — IPRATROPIUM BROMIDE AND ALBUTEROL SULFATE 1 AMPULE: 2.5; .5 SOLUTION RESPIRATORY (INHALATION) at 11:32

## 2022-01-01 RX ADMIN — MONTELUKAST SODIUM 10 MG: 10 TABLET, FILM COATED ORAL at 20:04

## 2022-01-01 RX ADMIN — CYANOCOBALAMIN TAB 1000 MCG 500 MCG: 1000 TAB at 21:38

## 2022-01-01 RX ADMIN — LEVOTHYROXINE SODIUM 50 MCG: 50 TABLET ORAL at 08:25

## 2022-01-01 RX ADMIN — AZITHROMYCIN MONOHYDRATE 500 MG: 500 INJECTION, POWDER, LYOPHILIZED, FOR SOLUTION INTRAVENOUS at 13:48

## 2022-01-01 RX ADMIN — CEFTRIAXONE 1000 MG: 1 INJECTION, POWDER, FOR SOLUTION INTRAMUSCULAR; INTRAVENOUS at 13:05

## 2022-01-01 RX ADMIN — IPRATROPIUM BROMIDE AND ALBUTEROL SULFATE 1 AMPULE: 2.5; .5 SOLUTION RESPIRATORY (INHALATION) at 13:32

## 2022-01-01 RX ADMIN — LISINOPRIL 10 MG: 10 TABLET ORAL at 09:07

## 2022-01-01 RX ADMIN — SODIUM CHLORIDE, PRESERVATIVE FREE 10 ML: 5 INJECTION INTRAVENOUS at 07:54

## 2022-01-01 RX ADMIN — IPRATROPIUM BROMIDE AND ALBUTEROL SULFATE 1 AMPULE: 2.5; .5 SOLUTION RESPIRATORY (INHALATION) at 05:27

## 2022-01-01 RX ADMIN — CEFTRIAXONE 1000 MG: 1 INJECTION, POWDER, FOR SOLUTION INTRAMUSCULAR; INTRAVENOUS at 13:40

## 2022-01-01 RX ADMIN — AZITHROMYCIN MONOHYDRATE 500 MG: 500 INJECTION, POWDER, LYOPHILIZED, FOR SOLUTION INTRAVENOUS at 14:12

## 2022-01-01 RX ADMIN — PANTOPRAZOLE SODIUM 40 MG: 40 TABLET, DELAYED RELEASE ORAL at 07:40

## 2022-01-01 RX ADMIN — POTASSIUM CHLORIDE 40 MEQ: 1500 TABLET, EXTENDED RELEASE ORAL at 09:07

## 2022-01-01 RX ADMIN — METOPROLOL SUCCINATE 100 MG: 100 TABLET, EXTENDED RELEASE ORAL at 09:06

## 2022-01-01 RX ADMIN — IPRATROPIUM BROMIDE AND ALBUTEROL SULFATE 1 AMPULE: 2.5; .5 SOLUTION RESPIRATORY (INHALATION) at 05:17

## 2022-01-01 RX ADMIN — CYANOCOBALAMIN TAB 1000 MCG 1000 MCG: 1000 TAB at 20:11

## 2022-01-01 RX ADMIN — LISINOPRIL 10 MG: 10 TABLET ORAL at 20:47

## 2022-01-01 RX ADMIN — HEPARIN SODIUM 5000 UNITS: 5000 INJECTION INTRAVENOUS; SUBCUTANEOUS at 21:38

## 2022-01-01 RX ADMIN — ENOXAPARIN SODIUM 30 MG: 100 INJECTION SUBCUTANEOUS at 07:54

## 2022-01-01 RX ADMIN — AZITHROMYCIN MONOHYDRATE 500 MG: 250 TABLET ORAL at 15:16

## 2022-01-01 RX ADMIN — IPRATROPIUM BROMIDE AND ALBUTEROL SULFATE 1 AMPULE: 2.5; .5 SOLUTION RESPIRATORY (INHALATION) at 16:10

## 2022-01-01 RX ADMIN — IPRATROPIUM BROMIDE AND ALBUTEROL SULFATE 1 AMPULE: 2.5; .5 SOLUTION RESPIRATORY (INHALATION) at 15:09

## 2022-01-01 RX ADMIN — LISINOPRIL 10 MG: 10 TABLET ORAL at 07:54

## 2022-01-01 RX ADMIN — HEPARIN SODIUM 5000 UNITS: 5000 INJECTION INTRAVENOUS; SUBCUTANEOUS at 09:03

## 2022-01-01 RX ADMIN — SODIUM CHLORIDE, PRESERVATIVE FREE 10 ML: 5 INJECTION INTRAVENOUS at 21:23

## 2022-01-01 RX ADMIN — IPRATROPIUM BROMIDE AND ALBUTEROL SULFATE 1 AMPULE: 2.5; .5 SOLUTION RESPIRATORY (INHALATION) at 20:24

## 2022-01-01 RX ADMIN — SODIUM CHLORIDE 1000 ML: 9 INJECTION, SOLUTION INTRAVENOUS at 13:52

## 2022-01-01 RX ADMIN — MONTELUKAST SODIUM 10 MG: 10 TABLET, FILM COATED ORAL at 20:11

## 2022-01-01 RX ADMIN — CYANOCOBALAMIN TAB 1000 MCG 1000 MCG: 1000 TAB at 20:04

## 2022-01-01 RX ADMIN — LEVOTHYROXINE SODIUM 50 MCG: 50 TABLET ORAL at 07:40

## 2022-01-01 RX ADMIN — METOPROLOL SUCCINATE 100 MG: 100 TABLET, EXTENDED RELEASE ORAL at 07:54

## 2022-01-01 RX ADMIN — LISINOPRIL 10 MG: 10 TABLET ORAL at 20:11

## 2022-01-01 RX ADMIN — AMIODARONE HYDROCHLORIDE 200 MG: 200 TABLET ORAL at 17:15

## 2022-01-01 RX ADMIN — SODIUM CHLORIDE, PRESERVATIVE FREE 10 ML: 5 INJECTION INTRAVENOUS at 20:39

## 2022-01-01 ASSESSMENT — PAIN SCALES - GENERAL
PAINLEVEL_OUTOF10: 0

## 2022-01-01 ASSESSMENT — PAIN SCALES - WONG BAKER: WONGBAKER_NUMERICALRESPONSE: 0

## 2022-01-20 PROBLEM — J15.9 COMMUNITY ACQUIRED BACTERIAL PNEUMONIA: Status: ACTIVE | Noted: 2022-01-01

## 2022-01-20 NOTE — PROGRESS NOTES
Daughter did inform writer that patient is a DNR-CCA and paper work should be in her chart. Writer did ask daughter to bring out forms.

## 2022-01-20 NOTE — ED PROVIDER NOTES
[facility]      Pt Name: Ayse Garcia  MRN: [id number]  [place] 12/11/1927  Date of evaluation: 1/20/2022  Provider: Fozia Taylor MD    [address] [place] [address]       Chief Complaint   Patient presents with    Cough     onset yesterday--pt has hx of pnuemonia    Fever         HISTORY OF PRESENT ILLNESS   (Location/Symptom, Timing/Onset, Context/Setting, Quality, Duration, Modifying Factors, Severity)  Note limiting factors. Ayse Garcia is a 80 y.o. female who presents to the emergency department      80year-old female brought to the emergency department by her daughter who is her primary caregiver for evaluation of fever and cough. Symptoms began yesterday. Minimally verbal at baseline. She does open her eyes to verbal stimuli and daughter reports that she is at her baseline mental functioning. She did not eat today. Daughter reports she usually does eat some soft foods in the morning. She did not appear to be in any pain. Daughter does report history of pneumonia as well as a history of diverticulitis. Nursing Notes were reviewed. REVIEW OF SYSTEMS    (2-9 systems for level 4, 10 or more for level 5)     Review of Systems   Unable to perform ROS: Dementia       Except as noted above the remainder of the review of systems was reviewed and negative.        PAST MEDICAL HISTORY     Past Medical History:   Diagnosis Date    Arthritis     Asthma     CHF (congestive heart failure) ([address]     Diverticulitis     Female stress incontinence     Hyperlipidemia     Hypertension     Pulmonary HTN ([address]     Thyroid disease          SURGICAL HISTORY       Past Surgical History:   Procedure Laterality Date    CARPAL TUNNEL RELEASE      CATARACT REMOVAL      CHOLECYSTECTOMY      HYSTERECTOMY           CURRENT MEDICATIONS       Discharge Medication List as of 1/23/2022 12:24 PM      CONTINUE these medications which have NOT CHANGED    Details   pantoprazole (PROTONIX) 40 MG tablet take 1 tablet by mouth once daily, R-0Historical Med      metoprolol succinate (TOPROL XL) 100 MG extended release tablet Take 1 tablet by mouth daily, Disp-30 tablet, R-3Normal      lisinopril (PRINIVIL;ZESTRIL) 10 MG tablet Take 1 tablet by mouth every 12 hours, Disp-30 tablet, R-3Normal      montelukast (SINGULAIR) 10 MG tablet Take 10 mg by mouth nightlyHistorical Med      vitamin B-12 (CYANOCOBALAMIN) 1000 MCG tablet Take 1,000 mcg by mouth nightlyHistorical Med             ALLERGIES     Venofer [iron sucrose] and Sulfa antibiotics    FAMILY HISTORY     History reviewed. No pertinent family history. SOCIAL HISTORY       Social History     Socioeconomic History    Marital status:      Spouse name: None    Number of children: None    Years of education: None    Highest education level: None   Occupational History    None   Tobacco Use    Smoking status: Never Smoker    Smokeless tobacco: Never Used   Vaping Use    Vaping Use: Never used   Substance and Sexual Activity    Alcohol use: No    Drug use: None    Sexual activity: None   Other Topics Concern    None   Social History Narrative    None     Social Determinants of Health     Financial Resource Strain:     Difficulty of Paying Living Expenses: Not on file   Food Insecurity:     Worried About Running Out of Food in the Last Year: Not on file    Vickey of Food in the Last Year: Not on file   Transportation Needs:     Lack of Transportation (Medical): Not on file    Lack of Transportation (Non-Medical):  Not on file   Physical Activity:     Days of Exercise per Week: Not on file    Minutes of Exercise per Session: Not on file   Stress:     Feeling of Stress : Not on file   Social Connections:     Frequency of Communication with Friends and Family: Not on file    Frequency of Social Gatherings with Friends and Family: Not on file    Attends Evangelical Services: Not on file   [employer] of [employer]s or Organizations: Not on file    Attends Club or Organization Meetings: Not on file    Marital Status: Not on file   Intimate Partner Violence:     Fear of Current or Ex-Partner: Not on file    Emotionally Abused: Not on file    Physically Abused: Not on file    Sexually Abused: Not on file   Housing Stability:     Unable to Pay for Housing in the Last Year: Not on file    Number of [place] in the Last Year: Not on file    Unstable Housing in the Last Year: Not on file       SCREENINGS        [place] Coma Scale  Eye Opening: Spontaneous  Best Verbal Response: Incomprehensible speech  Best Motor Response: Withdraws from pain  [place] Coma Scale Score: 10               PHYSICAL EXAM    (up to 7 for level 4, 8 or more for level 5)     ED Triage Vitals [01/20/22 0950]   BP Temp Temp Source Pulse Resp SpO2 Height Weight   (!) 137/93 100.4 °F (38 °C) Tympanic 81 18 97 % -- --       Physical Exam  Vitals and nursing note reviewed. Constitutional:       Comments: Somnolent. Opens eyes to voice and tactile stimulation. She does say out but is otherwise not verbally responsive. This is her baseline per daughter   HENT:      Head: Normocephalic. Cardiovascular:      Rate and Rhythm: Normal rate and regular rhythm. DIAGNOSTIC RESULTS     EKG: All EKG's are interpreted by the Emergency Department Physician who either signs or Co-signs this chart in the absence of a cardiologist.        RADIOLOGY:   Non-plain film images such as CT, Ultrasound and MRI are read by the radiologist. Plain radiographic images are visualized and preliminarily interpreted by the emergency physician with the below findings:        Interpretation per the Radiologist below, if available at the time of this note:    CT ABDOMEN PELVIS W IV CONTRAST Additional Contrast? None   Final Result   1. Question cystitis. 2.  Diverticulosis without evidence for acute diverticulitis. Large rectal   stool burden.       3.  Partially visualized large sliding-type hiatal hernia. RECOMMENDATIONS:   Unavailable         XR CHEST PORTABLE   Final Result   Left basilar opacification concerning for early consolidative process. XR CHEST STANDARD (2 VW)    (Results Pending)         ED BEDSIDE ULTRASOUND:   Performed by ED Physician - none    LABS:  Labs Reviewed   CULTURE, BLOOD 1 - Abnormal; Notable for the following components:       Result Value    Culture POSITIVE BLOOD CULTURE, RN NOTIFIED: (*)     Culture   (*)     Value: Detected: Staphylococcus epidermidis Detected: Methodology- Polymerase Chain Reaction (PCR)    Culture   (*)     Value: STAPHYLOCOCCUS EPIDERMIDIS A single positive blood culture of coagulase negative Staphylocci, diphtheroids,micrococci, Cutibacterium, viridans Streptocci, Bacillus, or Lactobacillus species should be interpreted with caution and viewed as a likely skin contaminant.     All other components within normal limits   BASIC METABOLIC PANEL - Abnormal; Notable for the following components:    Glucose 129 (*)     CREATININE 1.09 (*)     Bun/Cre Ratio 21 (*)     GFR Non- 47 (*)     GFR  57 (*)     All other components within normal limits   BRAIN NATRIURETIC PEPTIDE - Abnormal; Notable for the following components:    Pro- (*)     All other components within normal limits   CBC WITH AUTO DIFFERENTIAL - Abnormal; Notable for the following components:    WBC 11.5 (*)     Seg Neutrophils 82 (*)     Lymphocytes 12 (*)     Eosinophils % 0 (*)     Segs Absolute 9.28 (*)     All other components within normal limits   TROPONIN - Abnormal; Notable for the following components:    Troponin, High Sensitivity 24 (*)     All other components within normal limits   URINE RT REFLEX TO CULTURE - Abnormal; Notable for the following components:    Urine Hgb TRACE (*)     All other components within normal limits   MICROSCOPIC URINALYSIS - Abnormal; Notable for the following components: Bacteria, UA 2+ (*)     All other components within normal limits   COMPREHENSIVE METABOLIC PANEL W/ REFLEX TO MG FOR LOW K - Abnormal; Notable for the following components:    BUN 26 (*)     CREATININE 1.29 (*)     Chloride 96 (*)     Anion Gap 20 (*)     Total Bilirubin 0.27 (*)     GFR Non- 38 (*)     GFR  47 (*)     All other components within normal limits   CBC WITH AUTO DIFFERENTIAL - Abnormal; Notable for the following components:    RBC 3.88 (*)     All other components within normal limits   CBC WITH AUTO DIFFERENTIAL - Abnormal; Notable for the following components:    RBC 3.60 (*)     Hemoglobin 11.1 (*)     Hematocrit 34.0 (*)     All other components within normal limits   BASIC METABOLIC PANEL W/ REFLEX TO MG FOR LOW K - Abnormal; Notable for the following components:    BUN 35 (*)     CREATININE 1.28 (*)     Bun/Cre Ratio 27 (*)     GFR Non- 39 (*)     GFR  47 (*)     All other components within normal limits   CBC WITH AUTO DIFFERENTIAL - Abnormal; Notable for the following components:    RBC 3.54 (*)     Hemoglobin 11.2 (*)     Hematocrit 32.9 (*)     Lymphocytes 44 (*)     Eosinophils % 5 (*)     All other components within normal limits   BASIC METABOLIC PANEL W/ REFLEX TO MG FOR LOW K - Abnormal; Notable for the following components:    BUN 34 (*)     CREATININE 1.11 (*)     Bun/Cre Ratio 31 (*)     GFR Non- 46 (*)     GFR  55 (*)     All other components within normal limits   COVID-19, RAPID   RAPID INFLUENZA A/B ANTIGENS   LACTIC ACID, PLASMA   HEPATIC FUNCTION PANEL       All other labs were within normal range or not returned as of this dictation.     EMERGENCY DEPARTMENT COURSE and DIFFERENTIAL DIAGNOSIS/MDM:   Vitals:    Vitals:    01/22/22 2000 01/22/22 2330 01/23/22 0345 01/23/22 0816   BP: 128/75 122/62  130/73   Pulse: 93 91  82   Resp: 20 16  16   Temp: 97.8 °F (36.6 °C) 97.4 °F (36.3 °C) 97.7 °F (36.5 °C)   TempSrc: Temporal Temporal  Temporal   SpO2: 95% 97%  95%   Weight:   91 lb 11.2 oz (41.6 kg)    Height:             MDM  Number of Diagnoses or Management Options  Pneumonia of left lung due to infectious organism, unspecified part of lung  Diagnosis management comments: Pneumonia noted on cxr. Patient admitted for continued management. MIPS       REASSESSMENT          CRITICAL CARE TIME   Total Critical Care time was  minutes, excluding separately reportable procedures. There was a high probability of clinically significant/life threatening deterioration in the patient's condition which required my urgent intervention. CONSULTS:  IP CONSULT TO CASE MANAGEMENT    PROCEDURES:  Unless otherwise noted below, none     Procedures        FINAL IMPRESSION      1. Pneumonia of left lung due to infectious organism, unspecified part of lung    2. Community acquired bacterial pneumonia          DISPOSITION/PLAN   DISPOSITION Admitted 01/20/2022 02:11:11 PM      PATIENT REFERRED TO:  DOROTHY [facility] - [facility]  [address] [facility] [address]  [phone]    Schedule an appointment as soon as possible for a visit  follow up hospital visit      DISCHARGE MEDICATIONS:  Discharge Medication List as of 1/23/2022 12:24 PM      START taking these medications    Details   levoFLOXacin (LEVAQUIN) 500 MG tablet Take 1 tablet by mouth daily for 10 days, Disp-10 tablet, R-0Normal           Controlled Substances Monitoring:     No flowsheet data found.     (Please note that portions of this note were completed with a voice recognition program.  Efforts were made to edit the dictations but occasionally words are mis-transcribed.)    Juanjo Vicente MD (electronically signed)  Attending Emergency Physician             Juanjo Vicente MD  01/24/22 3086

## 2022-01-20 NOTE — PROGRESS NOTES
Writer was informed by patient daughter Tina Marquez) that patient does not talk much. Does open eyes to speech, is hard of hearing in the right ear. Patient is incontinent of B&B, patient wears pull-ons at home. Patient will normally chew pills, food is pureed with thin liquids. Patient does not move, has limited ROM to bilateral arms and legs. Patient does move head around on own. Patient is missing teeth. Redness noted to groin area and coccyx, writer has Calmoseptine paste in room to apply to areas. Rest of skin is intact and no other skin issues noted. Patient does not appear to be in pain. Lungs-dim in all fields, no S/SX of SOB/difficultly breathing. BS active X4, abdomen is soft and non-tender to touch. No edema noted. Patient is currently resting on left side. Family is at bedside.

## 2022-01-20 NOTE — ED NOTES
Unable to draw 2nd blood culture, MD notified. Permission to give antibiotics received from Dr. Renne Kawasaki. Rocephin infusion initiated.       Isha Gong RN  01/20/22 1207

## 2022-01-20 NOTE — ED NOTES
Lisestt care completed at this time.  Pt positioned on her back side per daughter request.      Dg Yao RN  01/20/22 5130

## 2022-01-21 NOTE — H&P
History and Physical    Patient:  Ayse Garcia  MRN: [id number]    Chief Complaint: Cough    History Obtained From:  electronic medical record, reason patient could not give history:  altered mental status    PCP: DOROTHY Louie CNP    History of Present Illness: The patient is a 80 y.o. female who presented to the emergency room by her daughter who is her primary care stating that patient had fever and cough. Symptom onset 1 day. Patient is minimally verbal at baseline. Patient does open her eyes to stimulation and her daughter reported she is at her baseline mental functioning. She reported decreased appetite. Daughter reported usually does eat some soft foods in the morning however did not. She stated she did not appear to be in any pain. The daughter reported history of pneumonia. During her evaluation patient was febrile at 100.4. No hypoxia was seen. She did open her eyes to tactile stimulation but is nonverbal.    Past Medical History:        Diagnosis Date    Arthritis     Asthma     [facility]est[facility]) [facility]     Diverticulitis     Female stress incontinence     Hyperlipidemia     Hypertension     Pulmonary HTN ([facility]     Thyroid disease        Past Surgical History:        Procedure Laterality Date    CARPAL TUNNEL RELEASE      CATARACT REMOVAL      CHOLECYSTECTOMY      HYSTERECTOMY         Medications Prior to Admission:    Prior to Admission medications    Medication Sig Start Date End Date Taking?  Authorizing Provider   pantoprazole (PROTONIX) 40 MG tablet take 1 tablet by mouth once daily 9/18/19  Yes Historical Provider, MD   metoprolol succinate (TOPROL XL) 100 MG extended release tablet Take 1 tablet by mouth daily 7/22/19  Yes James Castellanos MD   lisinopril (PRINIVIL;ZESTRIL) 10 MG tablet Take 1 tablet by mouth every 12 hours 7/21/19  Yes James Castellanos MD   montelukast (SINGULAIR) 10 MG tablet Take 10 mg by mouth nightly   Yes Historical Provider, MD   vitamin B-12 (CYANOCOBALAMIN) 1000 MCG tablet Take 1,000 mcg by mouth nightly   Yes Historical Provider, MD       Allergies:  Venofer [iron sucrose] and Sulfa antibiotics    Social History:   TOBACCO:   reports that she has never smoked. She has never used smokeless tobacco.  ETOH:   reports no history of alcohol use. Family History:   History reviewed. No pertinent family history. Allergies:  Venofer [iron sucrose] and Sulfa antibiotics    Medications Prior to Admission:    Prior to Admission medications    Medication Sig Start Date End Date Taking? Authorizing Provider   pantoprazole (PROTONIX) 40 MG tablet take 1 tablet by mouth once daily 9/18/19  Yes Historical Provider, MD   metoprolol succinate (TOPROL XL) 100 MG extended release tablet Take 1 tablet by mouth daily 7/22/19  Yes Ese Doll MD   lisinopril (PRINIVIL;ZESTRIL) 10 MG tablet Take 1 tablet by mouth every 12 hours 7/21/19  Yes Ese Doll MD   montelukast (SINGULAIR) 10 MG tablet Take 10 mg by mouth nightly   Yes Historical Provider, MD   vitamin B-12 (CYANOCOBALAMIN) 1000 MCG tablet Take 1,000 mcg by mouth nightly   Yes Historical Provider, MD       Review of Systems: Patient unable due to altered mental status      Physical Exam:    Vitals:   Temp: 97 °F (36.1 °C)  BP: (!) 126/91  Resp: 18  Pulse: 73  SpO2: 95 %  24HR INTAKE/OUTPUT:      Intake/Output Summary (Last 24 hours) at 1/21/2022 1534  Last data filed at 1/21/2022 1317  Gross per 24 hour   Intake 50 ml   Output    Net 50 ml       Weight    Body mass index is 21.85 kg/m². Exam:  GEN:   Awake, nonverbal does not follow direction  EYES:  EOMI, pupils equal   NECK: Supple. No lymphadenopathy. No carotid bruit  CVS:    regular rate and rhythm, no audible murmur  PULM:  CTA, no wheezes, rales or rhonchi, no acute respiratory distress  ABD:    Bowels sounds normal.  Abdomen is soft. No distention. no tenderness to palpation.    EXT: no edema bilaterally . No calf tenderness. NEURO: Moves all extremities. Motor and sensory are grossly intact  SKIN:  No rashes. No skin lesions.    -----------------------------------------------------------------  Diagnostic Data:     DATA:    CBC:   Lab Results   Component Value Date    WBC 8.7 01/21/2022    RBC 3.88 (L) 01/21/2022    HGB 12.1 01/21/2022    HCT 37.1 01/21/2022    MCV 95.6 01/21/2022     01/21/2022        CMP:   Lab Results   Component Value Date    GLUCOSE 80 01/21/2022    BUN 26 (H) 01/21/2022    CREATININE 1.29 (H) 01/21/2022     01/21/2022    K 4.4 01/21/2022    CALCIUM 9.3 01/21/2022    CL 96 (L) 01/21/2022    CO2 20 01/21/2022    PROT 7.1 01/21/2022    LABALBU 3.8 01/21/2022    BILITOT 0.27 (L) 01/21/2022    ALKPHOS 83 01/21/2022    ALT 14 01/21/2022    AST 30 01/21/2022       UA:   Lab Results   Component Value Date    COLORU Yellow 01/20/2022    SPECGRAV 1.010 01/20/2022    WBCUA 0 TO 2 01/20/2022    RBCUA 0 TO 2 01/20/2022    EPITHUA None 01/20/2022    LEUKOCYTESUR NEGATIVE 01/20/2022    GLUCOSEU NEGATIVE 01/20/2022    KETUA NEGATIVE 01/20/2022    PROTEINU NEGATIVE 01/20/2022    HGBUR TRACE (A) 01/20/2022    CASTUA NOT REPORTED 01/20/2022    CRYSTUA NOT REPORTED 01/20/2022    BACTERIA 2+ (A) 01/20/2022    YEAST NOT REPORTED 01/20/2022       Lactic Acid:   Lab Results   Component Value Date    LACTA 2.0 01/20/2022       D-Dimer:  No results found for: DDIMER    PT/INR:  No results found for: PROTIME, INR    High Sensitivity Troponin:  Recent Labs     01/20/22  1031   TROPHS 24*       ABGs:   No results found for: PHART, PH, YVA3EUB, PCO2, PO2ART, PO2, NPM6RTG, HCO3, BEART, BE, THGBART, THB, HCA3NHU, J5GVPVVZ, O2SAT, FIO2        CT ABDOMEN PELVIS W IV CONTRAST Additional Contrast? None   Final Result   1. Question cystitis. 2.  Diverticulosis without evidence for acute diverticulitis. Large rectal   stool burden.       3.  Partially visualized large sliding-type hiatal hernia. RECOMMENDATIONS:   Unavailable         XR CHEST PORTABLE   Final Result   Left basilar opacification concerning for early consolidative process. EKG reviewed    Assessment:    Principal Problem:    Community acquired bacterial pneumonia  Active Problems:    Late onset Alzheimer disease ([facility]    Stage 3 chronic kidney disease ([facility]  Resolved Problems:    * No resolved hospital problems.  *      Patient Active Problem List    Diagnosis Date Noted    Supraventricular tachycardia ([facility] 10/12/2015    Iron deficiency anemia 07/21/2019    Pleural effusion 07/21/2019    Stage 3 chronic kidney disease ([facility] 07/21/2019    Acute cystitis without hematuria 07/21/2019    Late onset Alzheimer disease ([facility] 07/17/2019    Essential hypertension 10/12/2015    Community acquired bacterial pneumonia 01/20/2022    Near syncope 10/02/2019    Adverse reaction to compound iron preparation, initial encounter 10/02/2019    Allergic reaction 10/02/2019    Acute on chronic diastolic congestive heart failure ([facility] 07/15/2019    Arthritis     Asthma     [facility]congest[facility]) [facility]     Hyperlipidemia     Thyroid disease        Plan:     · This patient requires inpatient admission because of community-acquired pneumonia  · Factors affecting the medical complexity of this patient include CKD stage III, late onset Alzheimer's disease  · Estimated length of stay is 3 days  · Community-acquired pneumonia  · IV fluids  · IV Rocephin/Zithromax  · Nebs  · Blood cultures x2pending  · CKD stage III  · Trend labs  · IV fluids  · Alzheimer's disease  · Plan will be to return home with daughter who is her caregiver  · DVT prophylaxis: Lovenox  · Peptic ulcer prophylaxis: Pepcid  · High risk medications: none  · Social Service and Case Management consults for DC planning  · Dietician consult initiated    CORE MEASURES  DVT prophylaxis: Lovenox  Decubitus ulcer present on admission: No  CODE STATUS: DNR-CCA  Nutrition Status: fair   Physical therapy: Yes   Old Charts reviewed: Yes  EKG Reviewed:  Yes  Advance Directive Addressed: Yes    DOROTHY Weller - CNP, DOROTHY, NP-C  1/21/2022, 3:34 PM

## 2022-01-21 NOTE — FLOWSHEET NOTE
Eyes closed. Awake for vitals and assessment. Responds to voice, non verbal. Incontinent of urine in brief. Wet brief changed and repositioned in bed. Call light within reach.  Continue to monitor

## 2022-01-21 NOTE — PROGRESS NOTES
Discussed discharge plans with the daughter. Patient is a 80year old female here with Community acquired bacterial pneumonia  . She is alert.     Patient is a  and lives with her daughter and son-in-law. She uses a transfer chair, wheelchair, shower chair , and grab bars. The daughter and son- in-law do the cooking and the housecleaning. The patient requires total care with her ADL's. The daughter & son in-law provides 24 hr care. The daughter manages the medications and the transportation.     Her PCP is Francisco Noland, DOROTHY - CNP   She has medical insurance that helps with medication costs.     The discharge plan is home with the family. She has a DNR-CCA and advance directives on file. LSW to monitor and assist with any needs or concerns as they arise.     CLOVER Campbell

## 2022-01-21 NOTE — FLOWSHEET NOTE
Vitals checked and assessment completed. Occasional dry cough noted. Pulse ox 97 % on room air. Incontinent a large amount of urine in brief. Brief changed, ernst care given. Repositioned in bed with 2 assist. Bed alarm on for safety.

## 2022-01-21 NOTE — FLOWSHEET NOTE
Incontinent of urine. Wet brief changed. Repositioned to her right side after a complete bath given.  Resting with eyes closed

## 2022-01-21 NOTE — FLOWSHEET NOTE
Incontinent of urine in brief. Brief changed and ernst care given. To chair at bedside with max of 2 assist. Does not bear weight. Chair alarm on for safety.  Continue to monitor

## 2022-01-22 NOTE — PROGRESS NOTES
Soap and water bath given at this time. Patient's brief was wet and changed. Patient repositioned to right side. Call light within reach. Bed alarm remains engaged for safety.

## 2022-01-22 NOTE — PROGRESS NOTES
Vitals and assessment completed as charted. Patient was incontinent, brief changed at this time. Patient repositioned to left side at this time. Call light within reach and bed alarm engaged for safety.

## 2022-01-22 NOTE — PROGRESS NOTES
Comprehensive Nutrition Assessment    Type and Reason for Visit:  Initial (missing malnutrition screen )    Nutrition Recommendations/Plan:   1. Continue current diet. 2. Continue current ONS. Nutrition Assessment:  Inadequate oral intakes r/t cognitive/neurological dysfunction aeb PO 51-75%, needs fed by daughter. Pt does not speak much, Dx Alzheimer's. Daughter reports of decreased PO since admission, \"does not eat well in the hospital.\" Ate pudding at breakfast, typically eats 2 meals per day at home. Drinks 8 oz ensure daily at home. ONS in place, Pt likes chocolate. Malnutrition Assessment:  Malnutrition Status:  No malnutrition    Context:  Acute Illness     Findings of the 6 clinical characteristics of malnutrition:  Energy Intake:  No significant decrease in energy intake  Weight Loss:  No significant weight loss     Body Fat Loss:  No significant body fat loss     Muscle Mass Loss:  No significant muscle mass loss    Fluid Accumulation:  No significant fluid accumulation     Strength:  Not Performed    Estimated Daily Nutrient Needs:  Energy (kcal):  0775-3658 (25-28/kg); Weight Used for Energy Requirements:  Current     Protein (g):  56-65g (1.3-1.5g/kg); Weight Used for Protein Requirements:  Current        Fluid (ml/day):  1204 ml; Method Used for Fluid Requirements:  1 ml/kcal      Nutrition Related Findings:  no visual malnutrition indices      Wounds:  None       Current Nutrition Therapies:    ADULT DIET;  Dysphagia - Pureed  ADULT ORAL NUTRITION SUPPLEMENT; Breakfast, Lunch, Dinner; Standard 4 oz Oral Supplement    Anthropometric Measures:  · Height: 4' 7\" (139.7 cm)  · Current Body Weight: 94 lb (42.6 kg)   · Admission Body Weight: 94 lb (42.6 kg)    · Usual Body Weight: 96 lb 8 oz (43.8 kg) (1/17/20)     · Ideal Body Weight: 75 lbs; % Ideal Body Weight 125.3 %   · BMI: 21.8  · BMI Categories: Underweight (BMI less than 22) age over 72       Nutrition Diagnosis:   · Inadequate oral intake related to cognitive or neurological impairment as evidenced by poor intake prior to admission      Nutrition Interventions:   Food and/or Nutrient Delivery:  Continue Current Diet,Continue Oral Nutrition Supplement  Nutrition Education/Counseling:  Education not appropriate   Coordination of Nutrition Care:  Continue to monitor while inpatient    Goals:  PO > 75% of meals and supplements        Recent Labs     01/20/22  1031 01/20/22  1031 01/21/22  0640 01/22/22  0520     --  136 137   K 5.2  --  4.4 3.9   CL 98  --  96* 102   CO2 23  --  20 22   BUN 23  --  26* 35*   CREATININE 1.09*  --  1.29* 1.28*   GLUCOSE 129*  --  80 95   ALT 13  --  14  --    ALKPHOS 99  --  83  --    GFR              < >                          < > = values in this interval not displayed.       Lab Results   Component Value Date    LABALBU 3.8 01/21/2022      Nutrition Monitoring and Evaluation:   Behavioral-Environmental Outcomes:  Knowledge or Skill   Food/Nutrient Intake Outcomes:  Food and Nutrient Intake,Supplement Intake  Physical Signs/Symptoms Outcomes:  Biochemical Data,Weight     Discharge Planning:    Continue Oral Nutrition Supplement,Continue current diet     Electronically signed by Alberto Corey RD, LD on 1/22/22 at 8:09 AM EST    Contact: [phone]

## 2022-01-22 NOTE — PROGRESS NOTES
Vitals and assessment done. Patient does not respond to questions when asked, eyes open spontaneously. Lung sounds are diminished and patient remains on RA. Therapy in the room and assisted with getting patient into the chair, completely dependent in ambulation. Patient not incontinent at this time. Call light within reach and chair alarm on.

## 2022-01-22 NOTE — PROGRESS NOTES
Progress Note    SUBJECTIVE:  FU related to nonverbal.  No chest pain or shortness of breath. OBJECTIVE:    Vitals:   TEMPERATURE:  Current - Temp: 97.7 °F (36.5 °C); Max - Temp  Av.8 °F (36.6 °C)  Min: 97 °F (36.1 °C)  Max: 98.4 °F (36.9 °C)  RESPIRATIONS RANGE: Resp  Av.7  Min: 18  Max: 22  PULSE RANGE: Pulse  Av  Min: 73  Max: 99  BLOOD PRESSURE RANGE:  Systolic (96ARP), CECILIA:814 , Min:126 , WZY:590   ; Diastolic (05CLG), JQA:99, Min:80, Max:91    PULSE OXIMETRY RANGE: SpO2  Av.5 %  Min: 94 %  Max: 95 %  24HR INTAKE/OUTPUT:    Intake/Output Summary (Last 24 hours) at 2022 0754  Last data filed at 2022  Gross per 24 hour   Intake 60 ml   Output    Net 60 ml     -----------------------------------------------------------------  Exam:  General: Alert and awake. Nonverbal  HEENT: Supple neck & negative  Heart: Regular  Lungs: Decreased breath sounds.   Abdomen: Normal & soft, No tenderness and BS normal  Extremities:  No edema   Neuro: NonFocal     -----------------------------------------------------------------  Diagnostic Data:  Lab Results   Component Value Date    WBC 7.3 2022    HGB 11.1 (L) 2022     2022       Lab Results   Component Value Date    BUN 35 (H) 2022    CREATININE 1.28 (H) 2022     2022    K 3.9 2022    CALCIUM 9.1 2022     2022    CO2 22 2022    LABGLOM 39 (L) 2022       Lab Results   Component Value Date    WBCUA 0 TO 2 2022    RBCUA 0 TO 2 2022    EPITHUA None 2022    LEUKOCYTESUR NEGATIVE 2022    SPECGRAV 1.010 2022    GLUCOSEU NEGATIVE 2022    KETUA NEGATIVE 2022    PROTEINU NEGATIVE 2022    HGBUR TRACE (A) 2022    CASTUA NOT REPORTED 2022    CRYSTUA NOT REPORTED 2022    BACTERIA 2+ (A) 2022    YEAST NOT REPORTED 2022       Lab Results   Component Value Date    TROPONINT NOT REPORTED 2022 PROBNP 961 (H) 01/20/2022       CT ABDOMEN PELVIS W IV CONTRAST Additional Contrast? None    Result Date: 1/20/2022  EXAMINATION: CT OF THE ABDOMEN AND PELVIS WITH CONTRAST 1/20/2022 11:44 am TECHNIQUE: CT of the abdomen and pelvis was performed with the administration of intravenous contrast. Multiplanar reformatted images are provided for review. Dose modulation, iterative reconstruction, and/or weight based adjustment of the mA/kV was utilized to reduce the radiation dose to as low as reasonably achievable. COMPARISON: None. HISTORY: ORDERING SYSTEM PROVIDED HISTORY: Abdominal tenderness with a history of diverticulitis TECHNOLOGIST PROVIDED HISTORY: Abdominal tenderness with a history of diverticulitis Decision Support Exception - unselect if not a suspected or confirmed emergency medical condition->Emergency Medical Condition (MA) FINDINGS: Lower Chest: Large sliding-type hiatal hernia partially visualized. Minimal dependent linear ground-glass opacities are noted, which may represent subsegmental atelectasis or scarring. Organs: Cholecystectomy. Liver cysts. Subcentimeter hypoattenuating renal lesions are noted, likely representing cysts. The pancreas, spleen, adrenals and kidneys reveal no acute findings. GI/Bowel: Large rectal stool burden and mild stool burden throughout the remainder of the colon. Diverticulosis. Pelvis: Bladder is decompressed. Mild wall thickening and enhancement. Peritoneum/Retroperitoneum: No free air or free fluid. The aorta is normal in caliber. The visceral branches are patent. Calcified atheromatous plaque is present. No lymphadenopathy. Bones/Soft Tissues: Osteopenia. Multilevel degenerative disc disease and facet arthropathy. Degenerative appearing grade 1 anterolisthesis of L5.     1.  Question cystitis. 2.  Diverticulosis without evidence for acute diverticulitis. Large rectal stool burden. 3.  Partially visualized large sliding-type hiatal hernia. RECOMMENDATIONS: Unavailable     XR CHEST PORTABLE    Result Date: 1/20/2022  EXAMINATION: ONE XRAY VIEW OF THE CHEST 1/20/2022 10:11 am COMPARISON: Chest x-ray dated 16 January 2020 HISTORY: ORDERING SYSTEM PROVIDED HISTORY: cough TECHNOLOGIST PROVIDED HISTORY: cough FINDINGS: Lungs are hypoinflated. No pneumothorax or effusion. There is left basilar opacification not seen on comparison with likely air bronchogram noted. Cardiomediastinal contour is unchanged with atherosclerosis and enlarged aortic arch noted. No acute or destructive lesions of bone. Left basilar opacification concerning for early consolidative process. ASSESSMENT:    Principal Problem:    Community acquired bacterial pneumonia  Active Problems:    Late onset Alzheimer disease ([address]    Stage 3 chronic kidney disease ([address]  Resolved Problems:    * No resolved hospital problems. *      Patient Active Problem List    Diagnosis Date Noted    Supraventricular tachycardia ([address] 10/12/2015    Iron deficiency anemia 07/21/2019    Pleural effusion 07/21/2019    Stage 3 chronic kidney disease ([address] 07/21/2019    Acute cystitis without hematuria 07/21/2019    Late onset Alzheimer disease ([address] 07/17/2019    Essential hypertension 10/12/2015    Community acquired bacterial pneumonia 01/20/2022    Near syncope 10/02/2019    Adverse reaction to compound iron preparation, initial encounter 10/02/2019    Allergic reaction 10/02/2019    Acute on chronic diastolic congestive heart failure ([address] 07/15/2019    Arthritis     Asthma     CHF (congestive heart failure) (HCC)     Hyperlipidemia     Thyroid disease        PLAN:  · Pneumonia. Planning on discharge tomorrow.   · Critical Care Time: Cydney Kulkarni MD , M.D.

## 2022-01-22 NOTE — PROGRESS NOTES
Patient resting in bed with eyes closed at this time. No distress noted to patient. Bed alarm remains engaged for safety.

## 2022-01-22 NOTE — PROGRESS NOTES
Physical Therapy    Facility/Department: [facility]  Initial Assessment    NAME: Joseph Serrano  : 1927  MRN: [id number]    Date of Service: 2022    Discharge Recommendations:  Continue to assess pending progress,Subacute/Skilled Nursing Facility,24 hour supervision or assist        Assessment   Assessment: Patient is 80year old female with dx of pneumonia who presents with decreased B LE strength, decreased functional mobility and endurance and decreased safety and balance during transfers and ambulation and would benefit from physical therapy to address all concerns and safely return to OF. Treatment Diagnosis: General weakness  Prognosis: Good  Decision Making: Low Complexity  REQUIRES PT FOLLOW UP: Yes  Activity Tolerance  Activity Tolerance: Treatment limited secondary to medical complications (free text); Patient limited by endurance; Patient limited by fatigue;Patient limited by cognitive status       Patient Diagnosis(es): The encounter diagnosis was Pneumonia of left lung due to infectious organism, unspecified part of lung.     has a past medical history of Arthritis, Asthma, CHF (congestive heart failure) ([address] Diverticulitis, Female stress incontinence, Hyperlipidemia, Hypertension, Pulmonary HTN ([address] and Thyroid disease. has a past surgical history that includes Hysterectomy; Cholecystectomy; Cataract removal; and Carpal tunnel release.     Restrictions  Restrictions/Precautions  Restrictions/Precautions: General Precautions,Fall Risk  Vision/Hearing  Vision: Impaired  Hearing: Exceptions to [facility]  Hearing Exceptions: Hard of hearing/hearing concerns     Subjective  General  Chart Reviewed: Yes  Patient assessed for rehabilitation services?: Yes  Response To Previous Treatment: Not applicable  Family / Caregiver Present: No  Referring Practitioner: SARAH Wellington  Referral Date : 22  Diagnosis: Pneumonia, J15.9  Follows Commands: Within Functional Limits  Subjective  Subjective: Patient very sleepy and unable to answer questions at this time. Pain Screening  Patient Currently in Pain: Other (comment) (FLACC 0)          Orientation  Orientation  Overall Orientation Status: Impaired  Orientation Level: Unable to assess  Social/Functional History  Social/Functional History  Lives With: Daughter  Type of Home: House  Home Layout: One level  Home Access: Level entry  Home Equipment: Wheelchair-manual,Lift chair  Receives Help From: Family  ADL Assistance: Needs assistance  Homemaking Assistance: Needs assistance  Homemaking Responsibilities: No  Ambulation Assistance: Needs assistance  Transfer Assistance: Needs assistance  Active : No  Additional Comments: Pt lives with daughter and son in law who do all IADL's and provide total, 24 hr care for patient at home. Cognition        Objective          AROM RLE (degrees)  RLE AROM: WFL  AROM LLE (degrees)  LLE AROM : WFL  Strength RLE  Comment: 3/5  Strength LLE  Comment: 3/5        Bed mobility  Supine to Sit: Maximum assistance  Transfers  Sit to Stand: Maximum Assistance;2 Person Assistance  Stand to sit: Maximum Assistance;2 Person Assistance  Stand Pivot Transfers: Maximum Assistance;2 Person Assistance  Ambulation  Ambulation?: No     Balance  Sitting - Static: Poor  Sitting - Dynamic: Poor  Standing - Static: Poor        Plan   Plan  Times per week: 7  Times per day: Twice a day (daily on weekends)  Current Treatment Recommendations: Strengthening,Neuromuscular Re-education,Home Exercise Program,ROM,Safety Education & Bette Gomez Training,Patient/Caregiver Education & Training,Functional Mobility Training,Transfer Training  Safety Devices  Type of devices:  All fall risk precautions in place,Left in chair,Call light within reach,Chair alarm in place,Nurse notified,Gait belt,Patient at risk for falls    Goals  Short term goals  Time Frame for Short term goals: 20 days  Short term goal 1: Patient to complete sit/stand and stand pivot transfers with maxAx1 and no LOB to decrease fall risk. Short term goal 2: Patient to complete bed mobility with modAx1 and have fair sitting balance to decrease fall risk. Short term goal 3: Patient to tolerate 20-30 min of ther ex/act to improve functional strength and endurance.        Therapy Time   Individual Concurrent Group Co-treatment   Time In 0720         Time Out 0735         Minutes 15         Timed Code Treatment Minutes: Abraham Bauer 22 K Ryan, PT, DPT

## 2022-01-22 NOTE — PROGRESS NOTES
Occupational Therapy   Occupational Therapy Initial Assessment  Date: 2022   Patient Name: Boris Jung  MRN: [id number]     : 1927    Date of Service: 2022    Discharge Recommendations:  24 hour supervision or assist       Assessment   Assessment: Due to Dep. ADL's past 3 years and total care from family, pt. not appropriate for OT. Treatment Diagnosis: Pnuemomia  Prognosis: Fair  Decision Making: Low Complexity  Assistance / Modification: total dep ADL's  Barriers to Learning: Unable to arouse. No Skilled OT:  (Pt. not appropriate for OT due to past 3 year total care for ADL's per Nrsg/family report.)  REQUIRES OT FOLLOW UP: No  Activity Tolerance  Activity Tolerance: Unable to arouse. Safety Devices  Safety Devices in place: Yes  Type of devices: Bed alarm in place           Patient Diagnosis(es): The encounter diagnosis was Pneumonia of left lung due to infectious organism, unspecified part of lung.     has a past medical history of Arthritis, Asthma, CHF (congestive heart failure) ([facility] Diverticulitis, Female stress incontinence, Hyperlipidemia, Hypertension, Pulmonary HTN ([address] and Thyroid disease. has a past surgical history that includes Hysterectomy; Cholecystectomy; Cataract removal; and Carpal tunnel release. Treatment Diagnosis: Pnuemomia      Restrictions  Restrictions/Precautions  Restrictions/Precautions: General Precautions,Fall Risk    Subjective   General  Chart Reviewed: Yes  Patient assessed for rehabilitation services?: Yes  Response to previous treatment: Patient unable to report, no changes reported from family or staff  Family / Caregiver Present: No  Subjective  Subjective: Pt. unable to be aroused. Nrsg reported the same. Pain Assessment  Pain Assessment: FLACC  Pain Level: 0  Vital Signs  Temp: 97.8 °F (36.6 °C)  Temp Source: Temporal  Pulse: 80  Heart Rate Source: Monitor; Apical  Resp: 18  BP: 130/82  BP Location: Right upper arm  MAP (mmHg): 107  Patient Position: Up in chair  Level of Consciousness: Responds to Voice (1)  MEWS Score: 1  Oxygen Therapy  SpO2: 94 %  Pulse Oximeter Device Mode: Intermittent  Pulse Oximeter Device Location: Finger  O2 Device: None (Room air)  Social/Functional History  Social/Functional History  Lives With: Daughter  Type of Home: House  Home Layout: One level  Home Access: Level entry  Home Equipment: Wheelchair-manual,Lift chair  Receives Help From: Family  ADL Assistance: Needs assistance  Homemaking Assistance: Needs assistance  Homemaking Responsibilities: No  Ambulation Assistance: Needs assistance  Transfer Assistance: Needs assistance  Active : No  Additional Comments: Nrsg reported pt. is 24 hour care. Pt. is fed and it takes 2 hours to complete at home with dgtr and son-in-law. Dep. ADL's past 3 yrs. Objective                 ADL  Additional Comments: Dependent ADL's per nrsg past 3 years and pt. will not participate.                                                           Plan   Plan  Times per week: 1 time visit, Eval only    G-Code     OutComes Score                                                  AM-PAC Score             Goals  Short term goals  Short term goal 1: N/A Eval only       Therapy Time   Individual Concurrent Group Co-treatment   Time In 1305         Time Out 1335         Minutes 30         Timed Code Treatment Minutes: 0 Minutes       Jessica Hart, OT

## 2022-01-23 NOTE — PROGRESS NOTES
Vitals and assessment done. Patient does not answer questions but does open her eyes in response to voices. Lung sounds are diminished. Lissett care was provided for incontinence then assisted to the chair Max 2 assist. Daughter arrived and is assisting in feeding the patient. Denies any needs for patient.

## 2022-01-23 NOTE — PROGRESS NOTES
Daughter and her  given discharge instructions due to the patient's mental status, all questions were answered. Assisted patient in getting dressed and family gathered belongings. They assisted getting patient into personal wheelchair and they denied needing help getting patient into personal vehicle. No signs of distress.

## 2022-01-23 NOTE — PROGRESS NOTES
Physician Progress Note      Matt Wall  CSN #:                  [id number]  :                       1927  ADMIT DATE:       2022 9:46 AM  DISCH DATE:  RESPONDING  PROVIDER #:        Maxim Hastings MD          QUERY TEXT:    Pt admitted with pneumonia  and has CHF documented in active problem list.    If possible, please document in progress notes and discharge summary further   specificity regarding the type and acuity of CHF:    The medical record reflects the following:  Risk Factors: HTN, hyperlipidemia  Clinical Indicators: per H&P: no rales, no edema;    admission Pro-;     2019 echo: EF >65%, moderate diastolic dysfunction  Treatment: metoprolol, lisinopril    Catalina Garcia RN, [address] [place] [address]   [phone]  . Options provided:  -- Chronic Diastolic CHF/HFpEF  -- Chronic Systolic and Diastolic CHF  -- Other - I will add my own diagnosis  -- Disagree - Not applicable / Not valid  -- Disagree - Clinically unable to determine / Unknown  -- Refer to Clinical Documentation Reviewer    PROVIDER RESPONSE TEXT:    This patient has chronic diastolic CHF/HFpEF.     Query created by: Cherelle Jones on 2022 4:36 PM      Electronically signed by:  Maxim Hastings MD 2022 8:49 AM

## 2022-01-23 NOTE — DISCHARGE INSTR - DIET
Good nutrition is important when healing from an illness, injury, or surgery. Follow any nutrition recommendations given to you during your hospital stay. If you were given an oral nutrition supplement while in the hospital, continue to take this supplement at home. You can take it with meals, in-between meals, and/or before bedtime. These supplements can be purchased at most local grocery stores, pharmacies, and chain [employer]-stores. If you have any questions about your diet or nutrition, call the hospital and ask for the dietitian.     General diet, plenty of fluids

## 2022-01-23 NOTE — DISCHARGE SUMMARY
80 y.o. female admitted with community-acquired bacterial pneumonia. Improved. Nonverbal baseline. Exam: Regular. Diminished breath sounds. Nonverbal.  Thin build.     Condition: Fair    Disposition:   Home    DC summary time : 35 minutes    Patient will be followed by DOROTHY Starks CNP in 1-2 weeks    Signed:  Kathi Abdul MD  1/23/2022, 10:41 AM

## 2022-01-23 NOTE — PROGRESS NOTES
Writer enters with patients family in room. Client consumed 75% of meal and took sips of remaining beverage on meal tray. Medications given crushed in applesauce and tolerated well. Patient assessed and vitals signs stable, returned from chair to bed with help of Alvan Ahumada, RN. Client requires no further needs, call light within reach, bed alarm initiated for client safety.

## 2022-01-23 NOTE — PROGRESS NOTES
Patient reassessed during nightly rounding. Patient repositioned in bed now laying on her right side. Pillows placed for support on the left side and under feet. Clients brief changed with incontinence care provided. Call light and belongings within reach. Will continue to monitor.

## 2022-04-20 PROBLEM — J18.9 COMMUNITY ACQUIRED PNEUMONIA OF RIGHT UPPER LOBE OF LUNG: Status: ACTIVE | Noted: 2022-01-01

## 2022-04-20 PROBLEM — J18.9 RIGHT UPPER LOBE PNEUMONIA: Status: ACTIVE | Noted: 2022-01-01

## 2022-04-20 PROBLEM — I50.32 CHRONIC DIASTOLIC HEART FAILURE (HCC): Status: ACTIVE | Noted: 2019-07-15

## 2022-04-20 NOTE — ED PROVIDER NOTES
[facility] ED  EMERGENCY DEPARTMENT ENCOUNTER      Pt Name: Linda Muse  MRN: [id number]  [place] 12/11/1927  Date of evaluation: 4/20/2022  Provider: Deloris Nixon MD    CHIEF COMPLAINT     No chief complaint on file. HISTORY OF PRESENT ILLNESS   (Location/Symptom, Timing/Onset, Context/Setting, Quality, Duration, Modifying Factors, Severity)  Note limiting factors. Linda Muse is a 80 y.o. female who presents to the emergency department     80year-old female presents emergency department with history for evaluation of increasing shortness of breath. Patient does live with family members at home. She has becoming less responsive over approximately 24 hours in duration. She has had occasional cough. Cough has been nonproductive. The patient does have DNR Comfort Care arrest status. Unable to obtain previous history from patient herself as she is severely hard of hearing          Nursing Notes were reviewed. REVIEW OF SYSTEMS    (2-9 systems for level 4, 10 or more for level 5)     Review of Systems   Unable to perform ROS: Dementia   All other systems reviewed and are negative. Except as noted above the remainder of the review of systems was reviewed and negative.        PAST MEDICAL HISTORY     Past Medical History:   Diagnosis Date    Arthritis     Asthma     [facility]estive heart [facility]) [facility] [address]     Diverticulitis     Female stress incontinence     Hyperlipidemia     Hypertension     Pulmonary HTN ([facility] [address]     Thyroid disease          SURGICAL HISTORY       Past Surgical History:   Procedure Laterality Date    CARPAL TUNNEL RELEASE      CATARACT REMOVAL      CHOLECYSTECTOMY      HYSTERECTOMY           CURRENT MEDICATIONS       Previous Medications    AMLODIPINE (NORVASC) 10 MG TABLET    Take 5 mg by mouth daily    IBUPROFEN (ADVIL;MOTRIN) 100 MG/5ML SUSPENSION    Take 10 mg/kg by mouth every 4 hours as needed for Fever    LEVOTHYROXINE (SYNTHROID) 50 MCG TABLET    Take 1 tablet by mouth Daily    LISINOPRIL (PRINIVIL;ZESTRIL) 10 MG TABLET    Take 1 tablet by mouth every 12 hours    METOPROLOL SUCCINATE (TOPROL XL) 100 MG EXTENDED RELEASE TABLET    Take 1 tablet by mouth daily    MONTELUKAST (SINGULAIR) 10 MG TABLET    Take 10 mg by mouth nightly    PANTOPRAZOLE (PROTONIX) 40 MG TABLET    take 1 tablet by mouth once daily    VITAMIN B-12 (CYANOCOBALAMIN) 1000 MCG TABLET    Take 500 mcg by mouth nightly        ALLERGIES     Venofer [iron sucrose] and Sulfa antibiotics    FAMILY HISTORY     History reviewed. No pertinent family history. SOCIAL HISTORY       Social History     Socioeconomic History    Marital status:      Spouse name: None    Number of children: None    Years of education: None    Highest education level: None   Occupational History    None   Tobacco Use    Smoking status: Never Smoker    Smokeless tobacco: Never Used   Vaping Use    Vaping Use: Never used   Substance and Sexual Activity    Alcohol use: No    Drug use: None    Sexual activity: None   Other Topics Concern    None   Social History Narrative    None     Social Determinants of Health     Financial Resource Strain:     Difficulty of Paying Living Expenses: Not on file   Food Insecurity:     Worried About Running Out of Food in the Last Year: Not on file    Vickey of Food in the Last Year: Not on file   Transportation Needs:     Lack of Transportation (Medical): Not on file    Lack of Transportation (Non-Medical):  Not on file   Physical Activity:     Days of Exercise per Week: Not on file    Minutes of Exercise per Session: Not on file   Stress:     Feeling of Stress : Not on file   Social Connections:     Frequency of Communication with Friends and Family: Not on file    Frequency of Social Gatherings with Friends and Family: Not on file    Attends Hoahaoism Services: Not on file    Active Member of Clubs or Organizations: Not on file    Attends Club or Organization Meetings: Not on file    Marital Status: Not on file   Intimate Partner Violence:     Fear of Current or Ex-Partner: Not on file    Emotionally Abused: Not on file    Physically Abused: Not on file    Sexually Abused: Not on file   Housing Stability:     Unable to Pay for Housing in the Last Year: Not on file    Number of [place] in the Last Year: Not on file    Unstable Housing in the Last Year: Not on file       SCREENINGS        Murray Coma Scale  Eye Opening: To speech  Best Verbal Response: Confused  Best Motor Response: Obeys commands  Murray Coma Scale Score: 13               PHYSICAL EXAM    (up to 7 for level 4, 8 or more for level 5)     ED Triage Vitals [04/20/22 1259]   BP Temp Temp Source Pulse Resp SpO2 Height Weight   84/69 97.7 °F (36.5 °C) Tympanic 75 30 93 % -- 94 lb (42.6 kg)       Physical Exam  Vitals and nursing note reviewed. Constitutional:       Comments: Arousable to verbal stimuli, however appears to be in moderate respiratory distress   HENT:      Head: Normocephalic. Nose: Nose normal.      Mouth/Throat:      Mouth: Mucous membranes are dry. Comments: Airway is patent    No drooling or stridor  Eyes:      Comments: The patient has her eyes closed    No   Neck:      Vascular: No carotid bruit. Comments: Marked kyphosis  Cardiovascular:      Rate and Rhythm: Normal rate and regular rhythm. Pulses: Normal pulses. Heart sounds: Normal heart sounds. Pulmonary:      Comments: Scattered rhonchi bibasilar rales    Marked kyphosis is noted  Abdominal:      General: Abdomen is flat. Palpations: Abdomen is soft. There is no mass. Tenderness: There is no abdominal tenderness. Musculoskeletal:         General: Normal range of motion. Cervical back: Normal range of motion. Right lower leg: No edema. Left lower leg: No edema. Lymphadenopathy:      Cervical: No cervical adenopathy. Skin:     General: Skin is warm. Capillary Refill: Capillary refill takes less than 2 seconds. Findings: No lesion or rash. Neurological:      General: No focal deficit present. Cranial Nerves: No cranial nerve deficit. Sensory: No sensory deficit. DIAGNOSTIC RESULTS     EKG: All EKG's are interpreted by the Emergency Department Physician who either signs or Co-signs this chart in the absence of a cardiologist.      RADIOLOGY:   Non-plain film images such as CT, Ultrasound and MRI are read by the radiologist. Plain radiographic images are visualized and preliminarily interpreted by the emergency physician with the below findings:      Interpretation per the Radiologist below, if available at the time of this note:    CT Head WO Contrast   Final Result   No acute intracranial abnormality. Prominent cerebral atrophy. Significant   hypodensity of the white matter in keeping with microvascular disease. Small   lacunar infarct in the region of the anterior limb of the right internal   capsule. XR CHEST PORTABLE   Final Result   Right upper lobe airspace consolidation compatible with pneumonia. Some   atelectatic changes may also be present. Questionable small right pleural effusion.                ED BEDSIDE ULTRASOUND:   Performed by ED Physician - none    LABS:  Labs Reviewed   BASIC METABOLIC PANEL - Abnormal; Notable for the following components:       Result Value    Glucose 132 (*)     BUN 35 (*)     CREATININE 1.10 (*)     Bun/Cre Ratio 32 (*)     Potassium 3.4 (*)     CO2 19 (*)     GFR Non- 46 (*)     GFR  56 (*)     All other components within normal limits   BRAIN NATRIURETIC PEPTIDE - Abnormal; Notable for the following components:    Pro-BNP 6,063 (*)     All other components within normal limits   CBC WITH AUTO DIFFERENTIAL - Abnormal; Notable for the following components:    WBC 26.6 (*)     RBC 3.49 (*)     Hemoglobin 10.7 (*)     Hematocrit 32.8 (*)     Seg Neutrophils 86 (*)     Lymphocytes 13 (*)     Monocytes 1 (*)     Eosinophils % 0 (*)     Segs Absolute 22.87 (*)     All other components within normal limits   TROPONIN - Abnormal; Notable for the following components:    Troponin, High Sensitivity 52 (*)     All other components within normal limits   BLOOD GAS, VENOUS - Abnormal; Notable for the following components:    pCO2, [place] 35.2 (*)     HCO3, Venous 20.8 (*)     Negative Base Excess, [place] 3.4 (*)     All other components within normal limits   TROPONIN - Abnormal; Notable for the following components:    Troponin, High Sensitivity 43 (*)     All other components within normal limits   CULTURE, BLOOD 1   CULTURE, BLOOD 1   HEPATIC FUNCTION PANEL   URINALYSIS WITH REFLEX TO CULTURE   LACTATE, SEPSIS   LACTATE, SEPSIS       All other labs were within normal range or not returned as of this dictation.     EMERGENCY DEPARTMENT COURSE and DIFFERENTIAL DIAGNOSIS/MDM:   Vitals:    Vitals:    04/20/22 1328 04/20/22 1332 04/20/22 1430 04/20/22 1529   BP: (!) 96/52  126/78    Pulse: 74  75 67   Resp: 28  23 20   Temp:       TempSrc:       SpO2: 92% 92%  98%   Weight:             MDM  Number of Diagnoses or Management Options  Anemia, unspecified type  Pleural effusion  Pneumonia of right upper lobe due to infectious organism  Shortness of breath  Diagnosis management comments: 80year-old patient presents emergency department with history for increasing shortness of breath necessitating supplemental oxygenation    Diagnostic considerations-congestive heart failure, reactive airway disease, pneumonia, electrolyte imbalance, pneumothorax, ACS syndrome    Patient does have DNR CC a status-family and presents are agreeable with initiating BiPAP    Cultures have been drawn IV antibiotics initiated in the emergency department-fluid bolus of 30 mill per kilogram was not administered as the patient does have a history for congestive heart failure and BNP was elevated at 4772    Consultation undertaken with Dr. Gabriela Harris the adult hospitalist we will have the patient admitted to the hospital       Amount and/or Complexity of Data Reviewed  Decide to obtain previous medical records or to obtain history from someone other than the patient: yes          REASSESSMENT     Air entry did improve after the patient was initiated on BiPAP and the patient did thereafter remain hemodynamically stable and nontoxic  ED Course as of 04/21/22 0826   Wed Apr 20, 2022   1313 EKG 12 Lead  Performed at 1211-the ventricular rate 74-sinus rhythm with premature atrial complexes-IA interval 0.120-QRS duration 1.070-QTc corrected 0.408 there is no ST segment elevation no dysrhythmia [RS]   1651 No intracranial bleed CT read by radiologist [RS]   1652 XR CHEST PORTABLE  Chest x-ray right upper lobe infiltrate with small right pleural effusion [RS]   1652 Troponin(!):    Troponin, High Sensitivity 43(!) [RS]   1652 CBC with Auto Differential(!):    WBC 26.6(!)   RBC 3.49(!)   Hemoglobin Quant 10.7(!)   Hematocrit 32.8(!)   MCV 94.0   MCH 30.7   MCHC 32.6   RDW 14.2   Platelet Count 602   MPV 10.6   NRBC Automated 0.0   Seg Neutrophils 86(!)   Lymphocytes 13(!)   Monocytes 1(!)   Eosinophils % 0(!)   Immature Granulocytes 0   Basophils 0   Segs Absolute 22.87(!)   Absolute Lymph # 3.46   Absolute Mono # 0.27   Absolute Eos # 0.00   Absolute Immature Granulocyte 0.00   Basophils Absolute 0.00   Morphology Normal [RS]   1652 Hepatic Function Panel:    Albumin 3.7   Alk Phos 56   ALT 10   AST 19   Bilirubin 0.70   Bilirubin, Direct <0.08   Bilirubin, Indirect Can not be calculated   Total Protein 7.0   ALBUMIN/GLOBULIN RATIO 1.1 [RS]   3366 Basic Metabolic Panel(!):    GLUCOSE, FASTING,(!)   BUN,BUNPL 35(!)   Creatinine 1.10(!)   Bun/Cre Ratio 32(!)   CALCIUM, SERUM, 125086 8.9   Sodium 136   Potassium 3.4(!)   Chloride 103   CO2 19(!)   Anion Gap 14   GFR Non- 46(!)   GFR  56(!) GFR Comment        GFR Staging      [RS]   1652 Blood gas, venous(!):    pH, Orville 7.389   pCO2, [place] 35.2(!)   pO2, [place] 35.8   HCO3, Venous 20.8(!)   Negative Base Excess, [place] 3.4(!)   O2 Sat, [place] 68.5   Pt Temp 37.0 [RS]   Thu Apr 21, 2022   0826 CT Head WO Contrast  CT of the patient's brain no intracranial hemorrhage [RS]      ED Course User Index  [RS] Anam Reeves MD         CRITICAL CARE TIME   Total Critical Care time was 55 minutes, excluding separately reportable procedures. There was a high probability of clinically significant/life threatening deterioration in the patient's condition which required my urgent intervention. CONSULTS:  As documented in the MDM    PROCEDURES:  Unless otherwise noted below, none     Procedures    FINAL IMPRESSION      1. Pneumonia of right upper lobe due to infectious organism    2. Pleural effusion    3. Anemia, unspecified type    4. Shortness of breath          DISPOSITION/PLAN   DISPOSITION Decision To Admit 04/20/2022 05:03:38 PM      PATIENT REFERRED TO:  No follow-up provider specified. DISCHARGE MEDICATIONS:  New Prescriptions    No medications on file     Controlled Substances Monitoring:     No flowsheet data found.     (Please note that portions of this note were completed with a voice recognition program.  Efforts were made to edit the dictations but occasionally words are mis-transcribed.)    Anam Reeves MD (electronically signed)  Attending Emergency Physician            Anam Reeves MD  04/21/22 5117

## 2022-04-20 NOTE — PROGRESS NOTES
Patient arrived to unit. Admission navigator being completed with family, VS stable. Lungs sounds clear throughout but diminished in the bilateral lower lobes. Patient has bilateral upper arm contracture with limited upper mobility. Bilateral heels slightly mushy, propped on pillow to keep off of mattress. Reddened coccyx area, no open area noted.

## 2022-04-21 PROBLEM — J96.01 ACUTE RESPIRATORY FAILURE WITH HYPOXIA (HCC): Status: ACTIVE | Noted: 2022-01-01

## 2022-04-21 PROBLEM — I48.91 ATRIAL FIBRILLATION WITH RVR (HCC): Status: ACTIVE | Noted: 2022-01-01

## 2022-04-21 PROBLEM — E44.0 MODERATE MALNUTRITION (HCC): Status: ACTIVE | Noted: 2022-01-01

## 2022-04-21 PROBLEM — A41.9 SEPSIS (HCC): Status: ACTIVE | Noted: 2022-01-01

## 2022-04-21 NOTE — PROGRESS NOTES
[facility]  Inpatient/Observation/Outpatient Rehabilitation    Date: 2022  Patient Name: Parker Murdock       [x] Inpatient Acute/Observation       []  Outpatient  : 1927         [] Pt cancelled due to:  [] No Reason Given   [] Sick/ill   [x] Other: Not appropriate for therapy at this time per RN. Therapist/Assistant will attempt to see this patient, at our earliest opportunity.        Jackson Gómez, OT Date: 2022

## 2022-04-21 NOTE — PROGRESS NOTES
Patient remains on biPAP, with settings at 35%. Patient responds to stimuli, but does not open her eyes or answer questions. No distress noted by observation of patient at this time.

## 2022-04-21 NOTE — H&P
Hypertension     Pulmonary HTN ([facility] [address]     Thyroid disease        Past Surgical History:        Procedure Laterality Date    CARPAL TUNNEL RELEASE      CATARACT REMOVAL      CHOLECYSTECTOMY      HYSTERECTOMY         Medications Prior to Admission:    Prior to Admission medications    Medication Sig Start Date End Date Taking? Authorizing Provider   amLODIPine (NORVASC) 10 MG tablet Take 5 mg by mouth daily 3/11/22  Yes Historical Provider, MD   levothyroxine (SYNTHROID) 50 MCG tablet Take 1 tablet by mouth Daily 1/24/22   Lorin Velásquez MD   pantoprazole (PROTONIX) 40 MG tablet take 1 tablet by mouth once daily 9/18/19   Historical Provider, MD   metoprolol succinate (TOPROL XL) 100 MG extended release tablet Take 1 tablet by mouth daily 7/22/19   Kaely Duran MD   lisinopril (PRINIVIL;ZESTRIL) 10 MG tablet Take 1 tablet by mouth every 12 hours 7/21/19   Kaley Duran MD   montelukast (SINGULAIR) 10 MG tablet Take 10 mg by mouth nightly    Historical Provider, MD   vitamin B-12 (CYANOCOBALAMIN) 1000 MCG tablet Take 500 mcg by mouth nightly     Historical Provider, MD       Allergies:  Venofer [iron sucrose] and Sulfa antibiotics    Social History:   TOBACCO:   reports that she has never smoked. She has never used smokeless tobacco.  ETOH:   reports no history of alcohol use. Family History:   History reviewed. No pertinent family history. Allergies:  Venofer [iron sucrose] and Sulfa antibiotics    Medications Prior to Admission:    Prior to Admission medications    Medication Sig Start Date End Date Taking?  Authorizing Provider   amLODIPine (NORVASC) 10 MG tablet Take 5 mg by mouth daily 3/11/22  Yes Historical Provider, MD   levothyroxine (SYNTHROID) 50 MCG tablet Take 1 tablet by mouth Daily 1/24/22   Lorin Velásquez MD   pantoprazole (PROTONIX) 40 MG tablet take 1 tablet by mouth once daily 9/18/19   Historical Provider, MD   metoprolol succinate (TOPROL XL) 100 MG extended release tablet Take 1 tablet by mouth daily 19   Paris Gilbert MD   lisinopril (PRINIVIL;ZESTRIL) 10 MG tablet Take 1 tablet by mouth every 12 hours 19   Paris Gilbert MD   montelukast (SINGULAIR) 10 MG tablet Take 10 mg by mouth nightly    Historical Provider, MD   vitamin B-12 (CYANOCOBALAMIN) 1000 MCG tablet Take 500 mcg by mouth nightly     Historical Provider, MD       Review of Systems: patient unable      Physical Exam:    Vitals:    Temp: 96.1 °F (35.6 °C)  BP: 135/62  Resp: 20  Pulse: 85  SpO2: 98 % on BiPAP  SpO2 range:   SpO2  Av.5 %  Min: 92 %  Max: 100 %    Exam:  GEN:    eyes closed on bipap. Does not follow directions or answer questions   EYES:  EOMI, pupils equal   NECK: Supple. No lymphadenopathy. No carotid bruit  CVS:    regular but tachycardic, no audible murmur  PULM:  diminished but clear breath sounds without wheezing, rales or rhonchi, no acute respiratory distress   ABD:    Bowels sounds normal.  Abdomen is soft. No distention. no tenderness to palpation. EXT:   no edema bilaterally . No calf tenderness. NEURO: Moves all extremities. Motor and sensory are grossly intact  SKIN:  No rashes.   No skin lesions.    -----------------------------------------------------------------  Diagnostic Data:     CBC:   Lab Results   Component Value Date    WBC 19.2 (H) 2022    RBC 3.23 (L) 2022    HGB 9.8 (L) 2022    HCT 30.8 (L) 2022    MCV 95.4 2022     2022      Lab Results   Component Value Date    SEGS 86 (H) 2022    NEUTROABS 16.50 (H) 2022    LYMPHOPCT 8 (L) 2022    LYMPHSABS 1.55 2022    MONOPCT 4 2022    EOSRELPCT 0 (L) 2022    BASOPCT 0 2022    IMMGRAN 1 (H) 2022     CMP:   Lab Results   Component Value Date    GLUCOSE 107 (H) 2022    BUN 34 (H) 2022    CREATININE 1.12 (H) 2022     2022    K 3.8 2022    CALCIUM 8.7 2022  04/21/2022    CO2 19 (L) 04/21/2022    PROT 6.3 (L) 04/21/2022    LABALBU 2.9 (L) 04/21/2022    BILITOT 0.35 04/21/2022    ALKPHOS 56 04/21/2022    ALT 10 04/21/2022    AST 19 04/21/2022     UA:   Lab Results   Component Value Date    COLORU Yellow 01/20/2022    SPECGRAV 1.010 01/20/2022    WBCUA 0 TO 2 01/20/2022    RBCUA 0 TO 2 01/20/2022    EPITHUA None 01/20/2022    LEUKOCYTESUR NEGATIVE 01/20/2022    GLUCOSEU NEGATIVE 01/20/2022    KETUA NEGATIVE 01/20/2022    PROTEINU NEGATIVE 01/20/2022    HGBUR TRACE (A) 01/20/2022    CASTUA NOT REPORTED 01/20/2022    CRYSTUA NOT REPORTED 01/20/2022    BACTERIA 2+ (A) 01/20/2022    YEAST NOT REPORTED 01/20/2022     Lactic Acid:   Lab Results   Component Value Date    LACTA 2.0 01/20/2022     D-Dimer:  No results found for: DDIMER  PT/INR:  No results found for: PROTIME, INR  Troponin:  No results for input(s): TROPONINI in the last 72 hours. ABGs:   No results found for: PHART, PH, VSD2KGI, PCO2, PO2ART, PO2, IIQ5PLO, HCO3, BEART, BE, THGBART, THB, KJS1QTK, P8TGTIUK, O2SAT, FIO2      EKG reviewed        Imaging Data:  CT Head WO Contrast   Final Result   No acute intracranial abnormality. Prominent cerebral atrophy. Significant   hypodensity of the white matter in keeping with microvascular disease. Small   lacunar infarct in the region of the anterior limb of the right internal   capsule. XR CHEST PORTABLE   Final Result   Right upper lobe airspace consolidation compatible with pneumonia. Some   atelectatic changes may also be present. Questionable small right pleural effusion.                  Assessment:    Principal Problem:    Right upper lobe pneumonia  Active Problems:    Supraventricular tachycardia ([address]    Essential hypertension    Late onset Alzheimer disease (HCC)    Pleural effusion    Stage 3 chronic kidney disease ([address]    Community acquired pneumonia of right upper lobe of lung    Chronic diastolic heart failure (HCC)  Resolved

## 2022-04-21 NOTE — PROGRESS NOTES
Patient's family was able to get her to eat a small amount of pudding at lunch as well as drink hot tea. Attempted to give patient PO antibiotic at this time. Crushed medication in pudding. Patient only would eat very small amounts at a time. Able to give patient about half of medication before she pursed her lips together and would not take anymore.

## 2022-04-21 NOTE — PROGRESS NOTES
[facility]  Inpatient/Observation/Outpatient Rehabilitation    Date: 2022  Patient Name: Josette Galvan       [x] Inpatient Acute/Observation  : 1927     [x] Pt cancelled due to:  [] No Reason Given   [] Sick/ill   [] Other:  Per nurse, patient has demonstrated increased responsiveness but not appropriate out out of bed evaluation at this time. Therapist/Assistant will attempt to see this patient, at our earliest opportunity.        Slim Harrison, PT, DPT Date: 2022

## 2022-04-21 NOTE — PROGRESS NOTES
[facility]  Inpatient/Observation/Outpatient Rehabilitation    Date: 2022  Patient Name: Bryant Navarro       [x] Inpatient Acute/Observation       []  Outpatient  : 1927       [] Pt no showed for scheduled appointment    [] Pt refused/declined therapy at this time due to:           [x] Pt cancelled due to:  [] No Reason Given   [] Sick/ill   [x] Other: Per nurse, patient has demonstrated increased responsiveness but not appropriate out out of bed evaluation at this time. Therapist/Assistant will attempt to see this patient, at our earliest opportunity.        Fernandez Zaldivar OTR/L Date: 2022

## 2022-04-21 NOTE — PROGRESS NOTES
Family would like to take BiPAP off and see if patient is able to eat or drink anything for lunch. BiPAP removed and patient placed on 2L O2 via NC. Family at bedside attempting to feed patient.

## 2022-04-21 NOTE — PROGRESS NOTES
Facsimile Transmission Cover Sheet    Information contained in this transmission is for the sole use of the intended recipients and may contain confidential and privileged information. Any unauthorized review, use, disclosure or distribution is prohibited. If you are not the intended recipient, please contact the sender and destroy all copies of the original message. Disclosure is made for the purpose of healthcare operations and continuity of care. To: __Dr. Pickett________________________    From: Speech Therapy       Sender:Taurus Menon SLP-Student_______________ Phone Number: [phone] ([place])    Melvi Thanh current unit  [][facility] [phone]  [phone]    Your bedside evaluation order for Melvi Law has been completed. Based on the results speech therapy recommends:     Puree solids and thin liquids    If you agree please enter the new diet order in [facility] or telephone the nursing unit. Diet will not change without your order.    Thank you,  Electronically signed by: Bryan WEBBER SLP-Student

## 2022-04-21 NOTE — PROGRESS NOTES
Patient remains inc of urine. Kwan placed for urine specimen and for prevention of skin breakdown. Patient tolerated well.

## 2022-04-21 NOTE — RT PROTOCOL NOTE
RESPIRATORY ASSESSMENT PROTOCOL                                                                                              Patient Name: Verda Claude Room#: P632/W294-67 : 1927     Admitting diagnosis: Shortness of breath [R06.02]  Pleural effusion [J90]  Pneumonia of right upper lobe due to infectious organism [J18.9]  Anemia, unspecified type [D64.9]  Community acquired pneumonia of right upper lobe of lung [J18.9]       Medical History:   Past Medical History:   Diagnosis Date    Arthritis     Asthma     CHF [facility]congest[facility] failure) ([facility]     Diverticulitis     Female stress incontinence     Hyperlipidemia     Hypertension     Pulmonary HTN ([facility]     Thyroid disease        PATIENT ASSESSMENT    LABORATORY DATA  Hematology:   Lab Results   Component Value Date    WBC 26.6 2022    RBC 3.49 2022    HGB 10.7 2022    HCT 32.8 2022     2022     Chemistry:  No results found for: PHART, WEU8VTL, PO2ART, J2ILNNUQ, FLC3WZF, PBEA    VITALS  Pulse 66  Resp 20  BP: (!) 101/47 (Simultaneous filing. User may not have seen previous data.)  SpO2: 97 % O2 Device: PAP (positive airway pressure)  Temp: 96.7 °F (35.9 °C)    SKIN COLOR  [x] Normal  [] Pale  [] Dusky  [] Cyanotic    RESPIRATORY PATTERN  [x] Normal  [] Dyspnea  [] Cheyne-Irwin  [] Kussmaul  [] Biots    AMBULATORY  [] Yes  [] No  [] With Assistance    PEAK FLOW  Predicted:     Personal Best:        VITAL CAPACITY  Predicted value:  ml  Actual Value:  ml  30% of Predicted:  ml  Patient Acuity 0 1 2 3 4 Score   Level of Concious (LOC) []  Alert & Oriented or Pt normal LOC []  Confused;follows directions [x]  Confused & uncooper-ative []  Obtunded []  Comatose 2   Respiratory Rate  (RR) []  Reg. rate & pattern. 12 - 20 bpm  [x]  Increased RR.  Greater than 20 bpm   []  SOB w/ exertion or RR greater than 24 bpm []  Access- ory muscle use at rest. Abn.  resp. []  SOB at rest.   1   Bilateral Breath Sounds (BBS) []  Clear []  Diminish-ed bases  [x]  Diminish-ed t/o, or rales   []  Sporadic, scattered wheezes or rhonchi []  Persistentwheezes and, or absent BBS 2   Cough []  Strong, effective, & non-prod. []  Effective & prod. Less than 25 ml (2 TBSP) over past 24 hrs [x]  Ineffective & non-prod to less than 25 ML over past 24 hrs []  Ineffective and, or greater than 25 ml sputum prod. past 24 hrs. []  Nonspon- taneous; Requires suctioning 2   Pulmonary History  (PULM HX) []  No smoking and no chronic pulmonaryhistory []  Former smoker. Quit over 12 mos. ago []  Current smoker or quit w/ in 12 mos []  Pulm. History and, or 20 pk/yr smoking hx [x]  Admitted w/ acute pulm. dx and, or has been admitted w/ pulm. dx 2 or more times over past 12 mos 4   Surgical History this Admit  (SURG HX) [x]  No surgery []  General surgery []  Lower abdominal []  Thoracic or upper abdominal   []  Thoracic w/ pulm. disease 0   Chest X-Ray (CXR)/CT Scan []  Clear or not applicable []  Not available []  Atelect- asis or pleural effusions [x]  Localized infiltrate or pulm. edema []  Con-solidated Infiltrates, bilateral, or in more than 1 lobe 4   Slow or Forced VC, FEV1 OR PEFR (PULM FXN)  [x]  80% or greater, or not indicated []  Pt. unable to perform []  FEV1 or PEFR or VC 51-79%.   []  FEV1 or PEFR or VC  30-49%   []  FEV1 or PEFR or VC less than 30%   0   TOTAL ACUITY: 15       CARE PLAN    If Acuity Level is 2, 3, or 4 in any of the following:    [x] BILATERAL BREATH SOUNDS (BBS)     [] PULMONARY HISTORY (PULM HX)  [x] PULMONARY FUNCTION (PULM FX)    Goal: Improve respiratory functions in patients with airway disease and decrease WOB    [x] AEROSOL PROTOCOL    Total Acuity:   16-32  []  Secondary Assessment in 24 hrs Total Acuity:  9-15  [x]  Secondary Assessment in 24 hrs Total Acuity:  4-8  []  Secondary Assessment in 48 hrs Total Acuity:  0-3  []  Secondary Assessment in 72 hrs   HHN AEROSOL THERAPY with  [physician-ordered bronchodilator(s)] q 4 & Albuterol PRN q2 hrs. Breath-Actuated Neb if BBS Acuity = 4, and pt. can use MP. Notify physician if condition deteriorates. HHN AEROSOL THERAPY with  [physician-ordered bronchodilator(s)]  QID and Albuterol PRN q4 hrs. Breath-Actuated Neb if BBS Acuity = 4, and pt. can use MP. Notify physician if condition deteriorates. MDI THERAPY with  2 actuations of [physician-ordered bronchodilator(s)] via spacer TID Albuterol and PRNq4 hrs. If unable to utilize MDI: HHN [physician-ordered bronchodilator(s)] TID and Albuterol PRN q4 hrs. Notify physician if condition deteriorates. MDI THERAPY with  [physician-ordered bronchodilator(s)] via spacer TID PRN. If unable to utilize MDI: HHN [physician-ordered bronchodilator(s)] TID PRN. Notify physician if condition deteriorates. If Acuity Level is 2, 3, or 4 in any of the following:    [x] COUGH     [] SURGICAL HISTORY (SURG HX)  [x] CHEST XRAY (CXR)    Goal: Improvement in sputum mobilization in patients with ineffective airway clearance. Reverse atelectasis. [] Bronchopulmonary Hygiene Protocol    Total Acuity:   16-32  []  Secondary Assessment in 24 hrs Total Acuity:  9-15  [x]  Secondary Assessment in 24 hrs Total Acuity:  4-8  []  Secondary Assessment in 48 hrs Total Acuity:  0-3  []  Secondary Assessment in 72 hrs   METANEB QID with [physician-ordered bronchodilator(s)] if CXR Acuity = 4; otherwise:  PD&P, PEP, or Vest QID & PRN  NT Sxn PRN for ineffective cough  METANEB QID with [physician-ordered bronchodilator(s)] if CXR Acuity = 4; otherwise:  PD&P, PEP, or Vest TID & PRN  NT Sxn PRN for ineffective cough  Instruct patient to self-perform IS q1hr WA   Directed Cough self-performed q1hr WA     If Acuity Level is 2 or above in the following:    [x] PULMONARY HISTORY (PULM HX)    Goal: Assist patient in quitting smoking to slow or stop the progression of lung disease.     [x] Smoking Cessation Protocol    SMOKING CESSATION EDUCATION provided according to policy NA_[id number]: (heraclio with an X)  ____Yes    ____ No     _x___ NA    Smoking Cessation Booklet given:  ____Yes  ____No ____Patient Pricilla Vargas

## 2022-04-21 NOTE — PROGRESS NOTES
Speech Language Pathology  Facility/Department: [facility] AT THE [facility]   CLINICAL BEDSIDE SWALLOW EVALUATION    NAME: Alma You  : 1927  MRN: [id number]    ADMISSION DATE: 2022  ADMITTING DIAGNOSIS: has Supraventricular tachycardia ([address] Essential hypertension; Arthritis; Asthma; CHF (congestive heart failure) ([address] Hyperlipidemia; Thyroid disease; Chronic diastolic heart failure ([address] Late onset Alzheimer disease ([address] Iron deficiency anemia; Pleural effusion; Stage 3 chronic kidney disease ([address] Acute cystitis without hematuria; Near syncope; Adverse reaction to compound iron preparation, initial encounter; Allergic reaction; Community acquired bacterial pneumonia; Right upper lobe pneumonia; Community acquired pneumonia of right upper lobe of lung; Moderate malnutrition ([address] Sepsis ([address] and Acute respiratory failure with hypoxia ([address] on their problem list.  ONSET DATE: 22    Recent Chest Xray/CT of Chest: 22  Right upper lobe airspace consolidation compatible with pneumonia.  Some   atelectatic changes may also be present.       Questionable small right pleural effusion. Date of Eval: 2022  Evaluating Therapist: Shubham Nicholson    Current Diet level:  Current Diet : Minced and Moist      Pain:  Pain Assessment  Pain Assessment: None - Denies Pain    Reason for Referral  Alma You was referred for a bedside swallow evaluation to assess the efficiency of her swallow function, identify signs and symptoms of aspiration and make recommendations regarding safe dietary consistencies, effective compensatory strategies, and safe eating environment. Impression  Dysphagia Diagnosis: Mild oral stage dysphagia;Mild pharyngeal stage dysphagia  Dysphagia Outcome Severity Scale: Level 3: Moderate dysphagia- Total assisstance, supervision or strategies.  Two or more diet consistencies restricted     Treatment Plan  Requires SLP Intervention: Yes             Recommended Diet and Intervention        Recommended Form of Meds: Crushed in puree as able  Recommendations: Total feed  Therapeutic Interventions: Diet tolerance monitoring;Patient/Family education    Compensatory Swallowing Strategies  Compensatory Swallowing Strategies : Alternate solids and liquids;Eat/Feed slowly;Upright as possible for all oral intake;Remain upright for 30-45 minutes after meals;Small bites/sips; Total feed    Treatment/Goals  Short-term Goals  Timeframe for Short-term Goals: 1 week  Goal 1: Family will report understanding of compensatory swallowing strategies. Long-term Goals  Timeframe for Long-term Goals: 1 week  Goal 1: Patient will tolerate safest, least restrictive diet without overt s/sx of asp/pen in 90% of trials. General  Chart Reviewed: Yes  Behavior/Cognition: Lethargic;Requires cueing;Doesn't follow directions  Respiratory Status: O2 via nasual cannula  O2 Device: Nasal cannula  Liters of Oxygen: 2 L  Communication Observation:  (Minimally verbal)  Follows Directions: None  Dentition: Some missing teeth  Patient Positioning: Upright in bed  Baseline Vocal Quality: Normal  Volitional Cough: Strong  Prior Dysphagia History: Patient's family states patient has difficulties swallowing characterized by holding bolus in mouth and delayed swallowing. Family states patient has minimal coughing. Family states patient was being given pureed diet at home prior to hospitalization. Consistencies Administered: Pureed;Mildly Thick - cup; Thin - cup; Thin - teaspoon           Vision/Hearing  Hearing  Hearing: Exceptions to [facility]  Hearing Exceptions: Hard of hearing/hearing concerns    Oral Motor Deficits   Unable to assess d/t patient unable to follow directions and limited alertness. Oral Phase Dysfunction  Oral Phase  Oral Phase: Exceptions  Oral Phase  Oral Phase - Comment: Patient noted to have mild oral phase dysphagia. Patient was noted to hold bolus in mouth with both liquids and solids.  Patient had anterior spillage of thin liquids x1, however ST suspects due to decreased alertness with family feeding. ST was unable to assess oral residues d/t patient unable to follow directions. Patient was unable to complete full oral mech exam due to decreased ability to follow directions. Indicators of Pharyngeal Phase Dysfunction   Pharyngeal Phase   Pharyngeal Phase: Patient trialed thin liquids via cup and s/sx of asp/pen in 2/13 trials. Patient trialed nectar thickened liquids and had no overt s/sx of asp/pen in 2/2 trials. Patient trialed puree and had no overt s/sx of asp/pen in 5/5 trials. Patient noted to have delayed swallow on both liquids and solids and decreased laryngeal elevation upon palpation. Prognosis  Prognosis  Prognosis for safe diet advancement: guarded  Barriers to reach goals: age;cognitive deficits  Individuals consulted  Consulted and agree with results and recommendations: Patient; Family member;RN  Family member consulted: Daughter    Education  Patient Education: Patient's family educated results of evaluation, diet recommendations, and POC. Patient Education Response: Verbalizes understanding  Safety Devices in place: Yes  Type of devices: All fall risk precautions in place       Therapy Time   13:04-13:30  26 minutes    Patient noted to have mild oral phase dysphagia. Patient was noted to hold bolus in mouth with both liquids and solids. Patient had anterior spillage of thin liquids x1, however ST suspects due to decreased alertness with family feeding. ST was unable to assess oral residues d/t patient unable to follow directions. Patient was unable to complete full oral mech exam due to decreased ability to follow directions. Patient trialed thin liquids via cup and s/sx of asp/pen in 2/13 trials. Patient trialed nectar thickened liquids and had no overt s/sx of asp/pen in 2/2 trials. Patient trialed puree and had no overt s/sx of asp/pen in 5/5 trials.  Patient noted to have delayed swallow on both liquids and solids and decreased laryngeal elevation upon palpation. ST recommends diet of puree solids and thin liquids with 1:1 feeding assistance. ST recommends 1x follow up.              Lizzie LUEVANO. SLP-Student  4/21/2022 2:06 PM

## 2022-04-21 NOTE — CONSULTS
Palliative Care Inpatient Evaluation    NAME:  Lizeth Ahn  MEDICAL RECORD NUMBER:  [id number]  AGE: 80 y.o. GENDER: female  : 1927  TODAY'S DATE:  2022    Reasons for Consultation:    Provision of information regarding PC and/or hospice philosophies  Complex, time-intensive communication and interdisciplinary psychosocial support  Clarification of goals of care and/or assistance with difficult decision-making  Guidance in regards to resources and transition(s)    Code Status:     History of Present Illness     The patient is a 80 y.o. Non- / non  female who presents with No chief complaint on file. Referred to Palliative Care by   [] Physician   [x] Nursing  [] Family Request   [] Other:       She was admitted to the ICU service for Shortness of breath [R06.02]  Pleural effusion [J90]  Pneumonia of right upper lobe due to infectious organism [J18.9]  Anemia, unspecified type [D64.9]  Community acquired pneumonia of right upper lobe of lung [J18.9]. Her hospital course has been associated with Right upper lobe pneumonia.  The patient has a complicated medical history and has been hospitalized since 2022 12:59 PM.    Active Hospital Problems    Diagnosis Date Noted    Supraventricular tachycardia ([address] [I47.1] 10/12/2015     Priority: High     Class: Acute    Moderate malnutrition ([address] [E44.0] 2022     Priority: Medium     Class: Acute    Right upper lobe pneumonia [J18.9] 2022     Priority: Medium    Community acquired pneumonia of right upper lobe of lung [J18.9] 2022     Priority: Medium    Stage 3 chronic kidney disease ([address] [N18.30] 2019     Priority: Medium    Pleural effusion [J90] 2019     Priority: Medium     Class: Acute    Late onset Alzheimer disease ([address] [G30.1, F02.80] 2019     Priority: Medium    Essential hypertension [I10] 10/12/2015     Priority: Medium     Class: Chronic    Chronic diastolic heart failure ([facility] [I50.32] 07/15/2019     Class: Acute       Data        Code Status: DNR-CCA     ADVANCED CARE PLANNING:  Patient has capacity for medical decisions: no  Health Care Power of : yes  Living Will: yes     Personal, Social, and Family History  Marital Status:   Living situation: at home with family  Viki/Spiritual History:   Denies needs   Psychological Distress: none  Does patient understand diagnosis/treatment? no  Does family/caregiver understand diagnosis/treatment?  yes    Assessment        Palliative Performance Scale:    ___100% Full ambulation; normal activity and work; no evidence of disease; able to do own self care; normal intake; fully conscious  ___90% Full ambulation; normal activity and work; some evidence of disease; able to do own self care; normal intake; fully conscious  ___80% Full ambulation; normal activity with effort; some evidence of disease; able to do own self care; normal or reduced intake; fully conscious  ___70% Ambulation reduced; unable to perform normal job/work; significant disease; able to do own self care; normal or reduced intake; fully conscious  ___60%  Ambulation reduced; cannot do hobbies/housework; significant disease; occasional assist; intake normal or reduced; fully conscious/some confusion  ___50%  Mainly sit/lie; can't do any work; extensive disease; considerable assist; intake normal or reduced; fully conscious/some confusion  ___40%  Mainly in bed; extensive disease; mainly assist; intake normal or reduced; fully conscious/ some confusion   _x__30%  Bed bound; extensive disease; total care; intake reduced; fully conscious/some confusion  ___20%  Bed bound; extensive disease; total care; intake minimal; drowsy/coma  ___10%  Bed bound; extensive disease; total care; mouth care only; drowsy/coma  ___0       Death            Readmission Risk Score: 15.4 ( )        Plan        Palliative Interaction: I talk with daughter Odessa Vazquez and son in [facility] concerning patient. She states that she is DPOAHC. Financial POA scanned in chart. She suggests calling Farooq Rojas her CNP to see if they have it in their office. (I called office with no answer. I called [employer] office number for him and they do not have a copy of it)  Daughter states that they have been taking care of her mom since 2005. Her  helps with get patient in to a wheelchair as such at home. They have everything they need to take care of her. ACP activator questions asked, see ACP sheet. They want her to be comfortable and do not wish her to be intubated or have CPR performed. They deny any needs at this time. Education/support to family  Continue with current plan of care  Code status clarified: DNRCCA    Principle Problem/Diagnosis:  Shortness of breath [R06.02]  Pleural effusion [J90]  Pneumonia of right upper lobe due to infectious organism [J18.9]  Anemia, unspecified type [D64.9]  Community acquired pneumonia of right upper lobe of lung [J18.9]    Goals of care evaluation:  The patient goals of care are improve or maintain function/quality of life and remain at home   Goals of care discussed with:    [] Patient independently    [] Patient and Family    [x] Family or Healthcare DPOA independently    [] Unable to discuss with patient, family/DPOA not present    Code Status  DNR-CCA    Palliative Care will continue to follow Ms. Lowe's care as needed. Thank you for allowing Palliative Care to participate in the care of Ms. Lowe .     Electronically signed by   Thereas Mary RN  Palliative Care Team  on 4/21/2022 at 1:11 PM    Palliative care office: [phone]

## 2022-04-21 NOTE — PROGRESS NOTES
Patient HR noted to be irregular and tachy. Notified Jhoana, EKG showed A-fib RVR. Orders for labs and to start amiodarone.

## 2022-04-21 NOTE — PROGRESS NOTES
Discussed discharge plans with the daughter and son rupert. Patient is a 80year old female here with Pneumonia of right upper lobe due to infectious organism. She is lethargic at this time.      Patient is a  and lives with her daughter and son-in-law. She uses a transfer chair, wheelchair, shower chair , and grab bars. The daughter and son- in-law do the cooking and the housecleaning. The patient requires total care with her ADL's. The daughter & son in-law provides 24 hr care. The daughter manages the medications and the transportation.     Her PCP is Sherren Reason, APRN - CNP   She has medical insurance that helps with medication costs.     The discharge plan is home with the family. Unknown at this time she may need hospice care. She has a DNR-CCA and advance directives on file. [facility] to monitor and assist with any needs or concerns as they arise.     CLOVER Guthrie

## 2022-04-21 NOTE — ACP (ADVANCE CARE PLANNING)
Advance Care Planning     Advance Care Planning Activator (Inpatient)  Conversation Note      Date of ACP Conversation: 4/21/2022     Conversation Conducted with:  Healthcare Decision Maker: Named in Advance Directive or Healthcare Power of  (name) Jann Mayers    ACP Activator: Cody Caban RN        Health Care Decision Maker:     Current Designated Health Care Decision Maker:     Primary Decision Maker: Nacho Luevano Child - [phone]    Secondary Decision Maker: Nico Urrutia - Child - [phone]    Today we documented Decision Maker(s) consistent with ACP documents on file. Care Preferences    Ventilation: \"If you were in your present state of health and suddenly became very ill and were unable to breathe on your own, what would your preference be about the use of a ventilator (breathing machine) if it were available to you? \"      Would the patient desire the use of ventilator (breathing machine)?: no    \"If your health worsens and it becomes clear that your chance of recovery is unlikely, what would your preference be about the use of a ventilator (breathing machine) if it were available to you? \"     Would the patient desire the use of ventilator (breathing machine)?: No      Resuscitation  \"CPR works best to restart the heart when there is a sudden event, like a heart attack, in someone who is otherwise healthy. Unfortunately, CPR does not typically restart the heart for people who have serious health conditions or who are very sick. \"    \"In the event your heart stopped as a result of an underlying serious health condition, would you want attempts to be made to restart your heart (answer \"yes\" for attempt to resuscitate) or would you prefer a natural death (answer \"no\" for do not attempt to resuscitate)? \" no       [x] Yes   [] No   Educated Patient / Jt Rollins regarding differences between Advance Directives and portable DNR orders.     Length of ACP Conversation in minutes: 15  Conversation Outcomes:  [x] ACP discussion completed  [x] Existing advance directive reviewed with patient; no changes to patient's previously recorded wishes  [] New Advance Directive completed  [] Portable Do Not Rescitate prepared for Provider review and signature  [] POLST/POST/MOLST/MOST prepared for Provider review and signature      Follow-up plan:    [] Schedule follow-up conversation to continue planning  [] Referred individual to Provider for additional questions/concerns   [x] Advised patient/agent/surrogate to review completed ACP document and update if needed with changes in condition, patient preferences or care setting    [] This note routed to one or more involved healthcare providers    [address] [place] [address] and Gayle Nurse Coordinator  4/21/2022 12:40 PM

## 2022-04-21 NOTE — PROGRESS NOTES
Comprehensive Nutrition Assessment    Type and Reason for Visit:  Initial    Nutrition Recommendations/Plan:   1. Continue current diet  2. Monitor PO intake and weight change     Malnutrition Assessment:  Malnutrition Status: Moderate malnutrition (04/21/22 0919)    Context:  Acute Illness     Findings of the 6 clinical characteristics of malnutrition:  Energy Intake:  50% or less of estimated energy requirements for 5 or more days (Pt family said last meal was 2 days ago, and she hasn't had an appetite in years.)  Weight Loss:  No significant weight loss     Body Fat Loss:  Mild body fat loss Orbital,Fat Overlying Ribs   Muscle Mass Loss:  Mild muscle mass loss Hand (interosseous),Temples (temporalis),Clavicles (pectoralis & deltoids)  Fluid Accumulation:  Unable to assess     Strength:  Not Performed    Nutrition Assessment:    Moderate malnutrition r/t, swallowing difficulty AEB, PO intake 0-25%, and poor PO prior to admission. Pt's family has been giving her puree foods for about 6 months due to swallowing difficulty. Appetite has been poor for the last few years, and dinner is usually her better meal. Last meal was 2 days ago. Nutrition Related Findings:    Pt has swallowing difficulty. mild s/sx of malnutrition. Wound Type: None       Current Nutrition Intake & Therapies:    Average Meal Intake: 1-25%  Average Supplements Intake: None Ordered  ADULT DIET; Dysphagia - Minced and Moist; Low Sodium (2 gm)    Anthropometric Measures:  Height: 4' 6\" (137.2 cm)  Ideal Body Weight (IBW): 70 lbs (32 kg)    Admission Body Weight: 94 lb (42.6 kg)  Current Body Weight: 95 lb 3.2 oz (43.2 kg), 136 % IBW.  Weight Source: Bed Scale  Current BMI (kg/m2): 22.9  Usual Body Weight: 91 lb (41.3 kg) (1/23/2022)  % Weight Change (Calculated): 4.6  BMI Categories: Normal Weight (BMI 22.0 to 24.9) age over 72    Estimated Daily Nutrient Needs:  Energy Requirements Based On: Kcal/kg  Weight Used for Energy Requirements: Current  Energy (kcal/day): 8328-2364 (26-29)  Weight Used for Protein Requirements: Current  Protein (g/day): 56-60 (1.3-1.4)  Method Used for Fluid Requirements: 1 ml/kcal  Fluid (ml/day): 1253    Nutrition Diagnosis:   · Moderate malnutrition related to swallowing difficulty as evidenced by intake 0-25%,poor intake prior to admission    Recent Labs     04/20/22  1300 04/21/22  0619    137   K 3.4* 3.8    106   CO2 19* 19*   BUN 35* 34*   CREATININE 1.10* 1.12*   GLUCOSE 132* 107*   ALT 10 10   ALKPHOS 56 56   GFR                            Lab Results   Component Value Date    LABALBU 2.9 04/21/2022        Nutrition Interventions:   Food and/or Nutrient Delivery: Continue Current Diet  Nutrition Education/Counseling: Education not indicated  Coordination of Nutrition Care: Continue to monitor while inpatient  Plan of Care discussed with: pt family. Goals:  Previous Goal Met: Progressing toward Goal(s)  Goals: PO intake 75% or greater,within 2 days       Nutrition Monitoring and Evaluation:   Behavioral-Environmental Outcomes: None Identified  Food/Nutrient Intake Outcomes: Diet Advancement/Tolerance,Food and Nutrient Intake  Physical Signs/Symptoms Outcomes: Nutrition Focused Physical Findings,Chewing or Swallowing,Weight    Discharge Planning:     Too soon to determine     [address]: [id number]

## 2022-04-21 NOTE — PLAN OF CARE
Problem: Safety - Adult  Goal: Free from fall injury  Outcome: Progressing  Flowsheets (Taken 4/21/2022 0037)  Free From Fall Injury: Instruct family/caregiver on patient safety  Note: Patient instructed on transfer safety, use of beside commode for increased safety. Problem: Skin/Tissue Integrity  Goal: Absence of new skin breakdown  Description: 1. Monitor for areas of redness and/or skin breakdown  2. Assess vascular access sites hourly  3. Every 4-6 hours minimum:  Change oxygen saturation probe site  4. Every 4-6 hours:  If on nasal continuous positive airway pressure, respiratory therapy assess nares and determine need for appliance change or resting period. Outcome: Progressing  Note: Patient educated on diabetic foot care to decrease the chance of getting a diabetic ulcer.

## 2022-04-22 PROBLEM — E83.42 HYPOMAGNESEMIA: Status: ACTIVE | Noted: 2022-01-01

## 2022-04-22 NOTE — DISCHARGE SUMMARY
Discharge Summary    Jessica James  :  1927  MRN:  [id number]    Admit date:  2022      Discharge date: 2022     Admitting Physician:  Jose Elias Mcbride MD    Discharge Diagnoses:    Principal Problem:    Right upper lobe pneumonia  Active Problems:    Supraventricular tachycardia ([address]    Essential hypertension    Late onset Alzheimer disease ([address]    Pleural effusion    Stage 3 chronic kidney disease ([address]    Community acquired pneumonia of right upper lobe of lung    Moderate malnutrition (HCC)    Sepsis ([address]    Acute respiratory failure with hypoxia ([address]    Atrial fibrillation with RVR ([address]    Hypomagnesemia    Chronic diastolic heart failure ([address]  Resolved Problems:    * No resolved hospital problems. *      Hospital Course:   Jessica James is a 80 y.o. female admitted with right upper lobe pneumonia. She presented to the emergency room due to increasing shortness of breath per family members. Family reported that she lives with family at home. They reported that she was becoming less responsive over approximately 24-hour period. They stated she has had occasional cough but was nonproductive. They stated she does have a DNR CC arrest status. Patient was unable to provide any history and is severely hard of hearing as well. Patient has history of severe dementia. During her evaluation patient was hypotensive but afebrile. Patient was tachypneic but not tachycardic. She aroused with stimuli but was in moderate respiratory distress upon arrival.  Her mucous membranes were dry. CT head showed prominent cerebral atrophy and a significant hypodensity of the white matter consistent with microvascular disease. There was also a small lacunar infarct in the region of the anterior limb of the right internal capsule as well. Chest x-ray showed right upper lobe pneumonia. There was concern for small pleural effusion as well.   Patient's initial white count was 26.6 with a hemoglobin of 10.7. Troponin was elevated at 43 and 52. VBG showed PCO2 35.2, bicarb 20.8. Patient was not given fluid bolus per sepsis protocol due to history of CHF and BNP was 6663 but was started on IV antibiotics. During patient's admission cardiology was consulted. Patient was placed on IV antibiotics. Patient tolerated BiPAP well and was weaned off but currently requires 2 L of oxygen continuously. Patient has poor oral intake. She is bed ridden and has severe dementia. Patient developed atrial fibrillation with RVR and was placed on amiodarone drip and changed to oral amiodarone today. Patient's antibiotics were adjusted due to amiodarone. Conversations took place with the family regarding the patient's baseline and current conditions. We discussed in depth treatments that have taken place. Family decided that they wanted to make her a DNR CC and only to provide comfort care. At this time they do not wish to have hospice care as they are already providing 100% care at home and do not feel that they need that however they would like their mom to come home. Patient was evaluated for oxygen by respiratory therapy and qualified for 2 L continuously and this is being set up through [facility] for delivery. Patient will be discharged home with family today for comfort care only she will continue with her oral medications and oral doxycycline. She will follow-up with primary care and cardiology as needed. Consultants:  Dr. Geno Ho, cardiology    Procedures: none    Complications: Atrial fibrillation with RVR    Discharge Condition: poor    Exam:  GEN:    Awake and following commands:      [x]? No               []? Yes  MENTAL STATUS: lethargic. DISTRESS: Acute respiratory distress:          [x]? No               []? Yes  EYES:   EOMI, pupils equal   NECK: Supple. No lymphadenopathy.   No carotid bruit  CVS:     irregular and tachycardic, Afib/Flutter with RVR, no audible murmur  PULM:  diminished with scattered rhonchi, no acute respiratory distress  ABD:     Bowels sounds normal.  Abdomen is soft. No distention. no tenderness to palpation. EXT:     no edema bilaterally . No calf tenderness. NEURO: Moves all extremities. Motor and sensory are grossly intact  SKIN:    No rashes. No skin lesions.        Significant Diagnostic Studies:   Lab Results   Component Value Date    WBC 15.5 (H) 04/22/2022    HGB 10.4 (L) 04/22/2022     04/22/2022       Lab Results   Component Value Date    BUN 21 04/22/2022    CREATININE 0.77 04/22/2022     04/22/2022    K 3.5 (L) 04/22/2022    CALCIUM 8.1 (L) 04/22/2022     04/22/2022    CO2 18 (L) 04/22/2022    LABGLOM >60 04/22/2022       Lab Results   Component Value Date    WBCUA 0 TO 2 01/20/2022    RBCUA 0 TO 2 01/20/2022    EPITHUA None 01/20/2022    LEUKOCYTESUR NEGATIVE 01/20/2022    SPECGRAV 1.010 01/20/2022    GLUCOSEU NEGATIVE 01/20/2022    KETUA NEGATIVE 01/20/2022    PROTEINU NEGATIVE 01/20/2022    HGBUR TRACE (A) 01/20/2022    CASTUA NOT REPORTED 01/20/2022    CRYSTUA NOT REPORTED 01/20/2022    BACTERIA 2+ (A) 01/20/2022    YEAST NOT REPORTED 01/20/2022       Echocardiogram complete 2D with doppler with color    Result Date: 4/21/2022  [facility] Transthoracic Echocardiography Report (TTE)  Patient Name [facility]     Date of Study               04/21/2022               [address] [place] [address]   Date of      12/11/1927  Gender                      Female  Birth   Age          80 year(s)  Race                           Room Number  I304        Height:                     54 inch, 137.16 cm   Corporate ID [id number]    Weight:                     95 pounds, 43.1 kg  #   Patient Acct [de-identified]   BSA:          1.26 m^2      BMI:      22.91  #                                                              kg/m^2   MR #         [id number]      Anna Breen   Accession #  [id number]  Interpreting Physician      Rolando Montez Fellow                   Referring Nurse             Victor Hugo Rajan, CNP                           Practitioner   Interpreting             Referring Physician  Fellow  Type of Study   TTE procedure:2D Echocardiogram, M-Mode, Doppler, Color Doppler. Procedure Date Date: 04/21/2022 Start: 09:02 AM Study Location: [place] [address]. History / Tech. Comments: Hypoxia PMHX: CHF, HTN, HLD, PHTN Patient Status: Inpatient Height: 54 inches Weight: 95 pounds BSA: 1.26 m^2 BMI: 22.91 kg/m^2 BP: 135/62 mmHg CONCLUSIONS Summary Global left ventricular systolic function appears preserved with an estimated ejection fraction of >65%. The left ventricular cavity size is within normal limits and the left ventricular wall thickness is within normal limits. No definite specific wall motion abnormalities were identified. Aortic valve leaflets are mildly thickened. No aortic regurgitation. Normal mitral valve structure with mild mitral regurgitation. Normal tricuspid valve structure with moderate tricuspid regurgitation with an estimated RVSP of 55 mmHg. Evidence of mild (grade I) diastolic dysfunction is seen. IVC Increased diameter and impaired or no inspiratory variation indicating elevated RA filling pressure (i.e. CVP) . Compared to the previous study of 7/15/19, the patients estimated RVSP has decreased from 70 mmHg to 55 mmHg. Signature ----------------------------------------------------------------------------  Electronically signed by Roni MartinezSonographer) on 04/21/2022 10:09  AM ---------------------------------------------------------------------------- ----------------------------------------------------------------------------  Electronically signed by Fide SuarezInterpreting physician) on  04/21/2022 03:06 PM ---------------------------------------------------------------------------- FINDINGS Left Atrium Left atrium is normal in size.  Left Ventricle Global left ventricular systolic function appears preserved with an estimated ejection fraction of >65%. The left ventricular cavity size is within normal limits and the left ventricular wall thickness is within normal limits. No definite specific wall motion abnormalities were identified. Right Atrium Right atrium is normal in size. Right Ventricle Normal right ventricular size and function. Mitral Valve Normal mitral valve structure with mild mitral regurgitation. Aortic Valve Aortic valve leaflets are mildly thickened. No aortic regurgitation. Tricuspid Valve Normal tricuspid valve structure with moderate tricuspid regurgitation with an estimated RVSP of 55 mmHg. Pulmonic Valve The pulmonic valve is normal in structure with mild regurgitation. Pericardial Effusion An anterior echo free space is seen suggestive of a small effusion or fat pad. Miscellaneous Evidence of mild (grade I) diastolic dysfunction is seen. Normal aortic root dimension. IVC Increased diameter and impaired or no inspiratory variation indicating elevated RA filling pressure (i.e. CVP) .  M-mode / 2D Measurements & Calculations:   LVIDd:3.4 cm(3.7 - 5.6 cm)       Diastolic KPGOEL:00.73 ml  LVIDs:2.37 cm(2.2 - 4.0 cm)      Systolic HBOKMJ:29.48 ml  IVSd:1.04 cm(0.6 - 1.1 cm)       Aortic Root:2.6 cm(2.0 - 3.7 cm)  LVPWd:1 cm(0.6 - 1.1 cm)         LA Dimension: 3.95 cm(1.9 - 4.0 cm)  Fractional Shortenin.29 %    LA volume/Index: 27.5 ml /22m^2  Calculated LVEF (%): 66.15 %     AV Cusp Separation: 1.49 cm   Mitral:                                 Aortic   Valve Area (P1/2-Time): 4.85 cm^2       Peak Velocity: 1.12 m/s  Peak E-Wave: 1.03 m/s                   Mean Velocity: 0.74 m/s  Peak A-Wave: 0.75 m/s                   Peak Gradient: 5.06 mmHg  E/A Ratio: 1.38                         Mean Gradient: 2.49 mmHg  Peak Gradient: 4.26 mmHg                                          Acceleration Time: 82.88 msec  P1/2t: 45.4 msec                                          AV VTI: 21.97 cm   Tricuspid:                              Pulmonic:   Estimated RVSP: 56.22 mmHg  Peak TR Velocity: 3.21 m/s  Peak TR Gradient: 41.2164 mmHg  Estimated RA Pressure: 15 mmHg                                          Estimated PASP: 56.22 mmHg  Diastology / Tissue Doppler Lateral Wall E' velocity:0.05 m/s Lateral Wall E/E':20    CT Head WO Contrast    Result Date: 4/20/2022  EXAMINATION: CT OF THE HEAD WITHOUT CONTRAST  4/20/2022 4:37 pm TECHNIQUE: CT of the head was performed without the administration of intravenous contrast. Dose modulation, iterative reconstruction, and/or weight based adjustment of the mA/kV was utilized to reduce the radiation dose to as low as reasonably achievable. COMPARISON: None. HISTORY: ORDERING SYSTEM PROVIDED HISTORY: Altered mental status TECHNOLOGIST PROVIDED HISTORY: Altered mental status Decision Support Exception - unselect if not a suspected or confirmed emergency medical condition->Emergency Medical Condition (MA) FINDINGS: BRAIN/VENTRICLES: There is prominent atrophy, with significant hypodensity of the white matter, in keeping with microvascular disease. There is a small lacunar infarct in the region of the anterior limb of the right internal capsule. There is no acute intracranial hemorrhage, mass effect or midline shift. No abnormal extra-axial fluid collection. The gray-white differentiation is maintained without evidence of an acute infarct. There is no evidence of hydrocephalus. ORBITS: The visualized portion of the orbits demonstrate no acute abnormality. SINUSES: The visualized paranasal sinuses and mastoid air cells demonstrate no acute abnormality. SOFT TISSUES/SKULL:  No acute abnormality of the visualized skull or soft tissues. No acute intracranial abnormality. Prominent cerebral atrophy. Significant hypodensity of the white matter in keeping with microvascular disease.   Small lacunar infarct in the region of the anterior limb of the right internal capsule. XR CHEST PORTABLE    Result Date: 4/20/2022  EXAMINATION: ONE XRAY VIEW OF THE CHEST 4/20/2022 1:16 pm COMPARISON: Plain film images of the chest March 1, 2022 and January 20, 2022. HISTORY: ORDERING SYSTEM PROVIDED HISTORY: dyspnea TECHNOLOGIST PROVIDED HISTORY: dyspnea FINDINGS: The heart is enlarged with generalized configuration. Hiatal hernia proximally 6-7 cm transverse. No evidence of pneumothorax. Chronic changes in the left lung field. There is some blunting of the right costophrenic sulcus laterally suggesting a small pleural effusion. There is airspace consolidation involving most of the right upper lung field which was not apparent on the previous studies. Degenerative changes are present in the spine and shoulders. Calcifications are present in the regions of the carotid bifurcations. Right upper lobe airspace consolidation compatible with pneumonia. Some atelectatic changes may also be present. Questionable small right pleural effusion.        Assessment and Plan:  Patient Active Problem List    Diagnosis Date Noted    Supraventricular tachycardia ([address] 10/12/2015    Hypomagnesemia 04/22/2022    Moderate malnutrition ([address] 04/21/2022    Sepsis ([address] 04/21/2022    Acute respiratory failure with hypoxia ([address] 04/21/2022    Atrial fibrillation with RVR ([address] 04/21/2022    Right upper lobe pneumonia 04/20/2022    Community acquired pneumonia of right upper lobe of lung 04/20/2022    Iron deficiency anemia 07/21/2019    Pleural effusion 07/21/2019    Stage 3 chronic kidney disease ([address] 07/21/2019    Acute cystitis without hematuria 07/21/2019    Late onset Alzheimer disease ([address] 07/17/2019    Essential hypertension 10/12/2015    Community acquired bacterial pneumonia 01/20/2022    Near syncope 10/02/2019    Adverse reaction to compound iron preparation, initial encounter 10/02/2019    Allergic reaction 10/02/2019    Chronic diastolic heart failure ([address] 07/15/2019    Arthritis     Asthma     CHF (congestive heart failure) (HCC)     Hyperlipidemia     Thyroid disease         Discharge Medications:         Medication List      START taking these medications    amiodarone 200 MG tablet  Commonly known as: CORDARONE  Take 1 tablet by mouth 2 times daily for 30 days, THEN 1 tablet daily. Start taking on: April 22, 2022     doxycycline hyclate 100 MG capsule  Commonly known as: VIBRAMYCIN  Take 1 capsule by mouth every 12 hours for 10 days     ipratropium-albuterol 0.5-2.5 (3) MG/3ML Soln nebulizer solution  Commonly known as: DUONEB  Inhale 3 mLs into the lungs every 6 hours as needed for Shortness of Breath        CONTINUE taking these medications    amLODIPine 10 MG tablet  Commonly known as: NORVASC     levothyroxine 50 MCG tablet  Commonly known as: SYNTHROID  Take 1 tablet by mouth Daily     lisinopril 10 MG tablet  Commonly known as: PRINIVIL;ZESTRIL  Take 1 tablet by mouth every 12 hours     metoprolol succinate 100 MG extended release tablet  Commonly known as: TOPROL XL  Take 1 tablet by mouth daily     montelukast 10 MG tablet  Commonly known as: SINGULAIR     pantoprazole 40 MG tablet  Commonly known as: PROTONIX     vitamin B-12 1000 MCG tablet  Commonly known as: CYANOCOBALAMIN        STOP taking these medications    ibuprofen 100 MG/5ML suspension  Commonly known as: ADVIL;MOTRIN           Where to Get Your Medications      These medications [facility] [address] [facility] [place] [facility] [address] [place] [phone]  [facility] [address]    Phone: [phone]   · amiodarone 200 MG tablet  · doxycycline hyclate 100 MG capsule  · ipratropium-albuterol 0.5-2.5 (3) MG/3ML Soln nebulizer solution         Patient Instructions:    Activity: activity as tolerated  Diet: cardiac diet  Wound Care: none needed  Other: None    Disposition:   Discharge to Home    Follow up:  Patient will be followed by Aidan Paradise, APRN - CNP in 1-2 weeks    CORE MEASURES on Discharge (if applicable)  ACE/ARB in CHF: Yes  Statin in MI: NA  ASA in [place]: NA  Statin in CVA: NA  Antiplatelet in CVA: NA    Total time spent on discharge services: 45 minutes    Including the following activities:  Evaluation and Management of patient  Discussion with patient and/or surrogate about current care plan  Coordination with Case Management and/or   Coordination of care with Consultants (if applicable)   Coordination of care with Receiving Facility Physician (if applicable)  Completion of DME forms (if applicable)  Preparation of Discharge Summary  Preparation of Medication Reconciliation  Preparation of Discharge Prescriptions    Signed:  DOROTHY Vazquez CNP, DOROTHY, NP-C  4/22/2022, 4:09 PM

## 2022-04-22 NOTE — PROGRESS NOTES
FACE TO FACE: Patient qualified for home O2. Discussed with patient the medical necessity of home O2. Patient voiced understanding and agreement.     DOROTHY Bell - CNP, APRN, NP-C  4/22/2022, 3:20 PM

## 2022-04-22 NOTE — CONSULTS
Patient: Marina Tripp  : 1927  Date of Admission: 2022  Primary Care Physician: Guille Del Rosario  Today's Date: 2022    REASON FOR CONSULTATION: Atrial fibrillation with RVR, right upper lobe pneumonia    HPI: Ms. Maggie Zacarias is a 80 y.o. female who was admitted to the hospital with shortness of breath. Currently being treated for new onset atrial fibrillation and pneumonia. Echo done 10/13/2015: Global left ventricular systolic function is normal Estimated ejection fraction is 60 % . Normal left ventricular cavity size and wall thickness. No definite specific wall motion abnormalities can be identified. Mild mitral and tricuspid regurgitation. Normal right ventricular size and systolic function. Estimated right ventricular systolic pressure is 29 mmHg. Evidence of moderate (Grade II) diastolic dysfunction is seen. Echo done 7/15/2019: Global left ventricular systolic function appears preserved with an estimated ejection fraction of >65%. Normal left ventricular wall thickness with a normal left ventricular cavity size. No definite specific wall motion abnormalities were identified. Mild mitral regurgitation. Moderate to severe tricuspid regurgitation. Severe pulmonary hypertension with an estimated right ventricular systolic pressure of 70 mmHg. Evidence of moderate diastolic dysfunction is seen. She has a history of chronic diastolic heart failure and was last hospitalized several years ago in 2019. Patient is not alert or oriented. Was able to arouse, however she was not able to provide a history. Per chart review and discussing with nursing she was brought out to the ER due to a week of becoming less responsive, more shortness of breath, and a cough. She lives at home with her family. She was found to have pneumonia and new onset atrial fibrillation.     Today her heart rate continues to be elevated but currently she is in normal sinus rhythm with APCs, it has been over 100, but staying less than 110 bpm on the amiodarone. Her magnesium was 0.9, potassium 3.5, hemoglobin 10.4, and troponin is trending down 52, 43, now 38. Her BNP was elevated to 6063. Her chest XR showed pneumonia and a possible right sided pleural effusion. Family is considering hospice care at this time, she has severe dementia and is non-ambulatory. Echo done 4/21/2022: Global left ventricular systolic function appears preserved with an estimated ejection fraction of >65%. The left ventricular cavity size is within normal limits and the left ventricular wall thickness is within normal limits. No definite specific wall motion abnormalities were identified. Aortic valve leaflets are mildly thickened. No aortic regurgitation. Normal mitral valve structure with mild mitral regurgitation. Normal tricuspid valve structure with moderate tricuspid regurgitation with an estimated RVSP of 55 mmHg. Evidence of mild (grade I) diastolic dysfunction is seen. IVC Increased diameter and impaired or no inspiratory variation indicating elevated RA filling pressure (i.e. CVP) . Compared to the previous study of 7/15/19, the patients estimated RVSP has decreased from 70 mmHg to 55 mmHg. Past Medical History:   Diagnosis Date    Acute respiratory failure with hypoxia ([address] 4/21/2022    Arthritis     Asthma     Atrial fibrillation with RVR ([address] 4/21/2022    CHF (congestive heart failure) ([address]     Diverticulitis     Female stress incontinence     Hyperlipidemia     Hypertension     Hypomagnesemia 4/22/2022    Pulmonary HTN ([address]     Sepsis ([address] 4/21/2022    Thyroid disease        CURRENT ALLERGIES: Venofer [iron sucrose] and Sulfa antibiotics REVIEW OF SYSTEMS: Unable to obtain because patient is not oriented. She is only arousable on examination but just moans.      Past Surgical History:   Procedure Laterality Date    CARPAL TUNNEL RELEASE      CATARACT REMOVAL      CHOLECYSTECTOMY      HYSTERECTOMY Social History:  Social History     Tobacco Use    Smoking status: Never Smoker    Smokeless tobacco: Never Used   Vaping Use    Vaping Use: Never used   Substance Use Topics    Alcohol use: No    Drug use: Not on file        CURRENT MEDICATIONS:  Outpatient Medications Marked as Taking for the 4/20/22 encounter [facility] Encounter)   Medication Sig Dispense Refill    amLODIPine (NORVASC) 10 MG tablet Take 5 mg by mouth daily         morphine (PF) injection 2 mg, Q4H PRN  magnesium sulfate 1000 mg in dextrose 5% 100 mL IVPB, PRN  amiodarone (CORDARONE) 450 mg in dextrose 5% 250 mL infusion, Continuous  0.9 % sodium chloride infusion, Continuous  amLODIPine (NORVASC) tablet 5 mg, Daily  levothyroxine (SYNTHROID) tablet 50 mcg, Daily  lisinopril (PRINIVIL;ZESTRIL) tablet 10 mg, 2 times per day  metoprolol succinate (TOPROL XL) extended release tablet 100 mg, Daily  montelukast (SINGULAIR) tablet 10 mg, Nightly  pantoprazole (PROTONIX) tablet 40 mg, QAM AC  vitamin B-12 (CYANOCOBALAMIN) tablet 500 mcg, Nightly  sodium chloride flush 0.9 % injection 5-40 mL, 2 times per day  sodium chloride flush 0.9 % injection 5-40 mL, PRN  0.9 % sodium chloride infusion, PRN  heparin (porcine) injection 5,000 Units, BID  ondansetron (ZOFRAN-ODT) disintegrating tablet 4 mg, Q8H PRN   Or  ondansetron (ZOFRAN) injection 4 mg, Q6H PRN  polyethylene glycol (GLYCOLAX) packet 17 g, Daily PRN  acetaminophen (TYLENOL) tablet 650 mg, Q6H PRN   Or  acetaminophen (TYLENOL) suppository 650 mg, Q6H PRN  cefTRIAXone (ROCEPHIN) 1000 mg IVPB in 50 mL D5W minibag, Q24H   And  azithromycin (ZITHROMAX) tablet 500 mg, Q24H  ipratropium-albuterol (DUONEB) nebulizer solution 1 ampule, 4x Daily         FAMILY HISTORY: Family history reviewed, noncontributory.     PHYSICAL EXAM:   /79   Pulse 97   Temp 98.6 °F (37 °C) (Temporal)   Resp 23   Ht 4' 6\" (1.372 m)   Wt 95 lb 12.8 oz (43.5 kg)   SpO2 (!) 89%   BMI 23.10 kg/m²  Body mass index is 23.1 kg/m². Constitutional: She is in no acute distress. HEENT: Normocephalic and atraumatic. No JVD present. Carotid bruit is not present. No mass and no thyromegaly present. No lymphadenopathy present. Cardiovascular: Normal rate, regular rhythm, normal heart sounds. Exam reveals no gallop and no friction rubs. 3/6 systolic murmur, 5th intercostal space on the LEFT in the mid-clavicular line (cardiac apex)  Pulmonary/Chest: Bilateral basal crackles. Decreased lung sounds bilaterally. Abdominal: Soft, non-tender. Bowel sounds and aorta are normal. She exhibits no organomegaly, mass or bruit. Extremities: Trace lower extremity edema. No cyanosis or clubbing. 2+ radial and carotid pulses. Distal extremity pulses: 2+ bilaterally. Skin: Skin is warm and dry. There is no rash or diaphoresis.         MOST RECENT LABS ON RECORD:   Lab Results   Component Value Date    WBC 15.5 (H) 04/22/2022    HGB 10.4 (L) 04/22/2022    HCT 31.1 (L) 04/22/2022     04/22/2022    CHOL 206 (H) 04/06/2021    TRIG 159 (H) 04/06/2021    HDL 50 04/06/2021    LDLCHOLESTEROL 124 04/06/2021    ALT 15 04/22/2022    AST 24 04/22/2022     04/22/2022    K 3.5 (L) 04/22/2022     04/22/2022    CREATININE 0.77 04/22/2022    BUN 21 04/22/2022    CO2 18 (L) 04/22/2022    TSH 2.40 10/18/2021        ASSESSMENT:  Patient Active Problem List    Diagnosis Date Noted    Supraventricular tachycardia ([address] 10/12/2015    Hypomagnesemia 04/22/2022    Moderate malnutrition (HCC) 04/21/2022    Sepsis ([address] 04/21/2022    Acute respiratory failure with hypoxia (HCC) 04/21/2022    Atrial fibrillation with RVR [facility] 04/21/2022    Right upper lobe pneumonia 04/20/2022    Community acquired pneumonia of right upper lobe of lung 04/20/2022    Iron deficiency anemia 07/21/2019    Pleural effusion 07/21/2019    Stage 3 chronic kidney disease ([facility] 07/21/2019    Acute cystitis without hematuria 07/21/2019    Late onset Alzheimer disease ([facility] 07/17/2019    Essential hypertension 10/12/2015    Community acquired bacterial pneumonia 01/20/2022    Near syncope 10/02/2019    Adverse reaction to compound iron preparation, initial encounter 10/02/2019    Allergic reaction 10/02/2019    Chronic diastolic heart failure ([facility] 07/15/2019    Arthritis     Asthma     CHF (congestive heart failure) (HCC)     Hyperlipidemia     Thyroid disease        PLAN:    · Pneumonia:  · Continue current treatment    · New Onset Atrial Fibrillation: Currently maintaining sinus rhythm.  Beta Blocker: Continue Metoprolol succinate (Toprol XL) 100 mg daily.  Calcium Channel Blocker: Continue amlodipine (Norvasc) 5 mg once daily.  Anti-Arrhythmic: amiodarone drip WITHOUT a bolus starting with 1 mg/min x 6 hours and then reduce down to 1/2 mg/min x 8 hours, then stop and start amiodarone 200 mg bid. Monitoring: Since they will being maintained on Amiodarone, I told them that we will need to closely monitor them for potential side effects. These include monitoring LFTs and TSH at least every 6 months as well as chest x-rays, pulmonary function tests, and eye exams at least yearly. [id number] Score for Atrial Fibrillation Stroke Risk   Risk   Factors  Component Value   C CHF Yes 1   H HTN Yes 1   A2 Age >= 76 Yes,  (80 y.o.) 2   D DM No 0   S2 Prior Stroke/TIA No 0   V Vascular Disease No 0   A Age 74-69 No,  (80 y.o.) 0   Sc Sex female 1    [id number]  Score  5   Score last updated 0/02/58 9:63 PM EDT  Click here for a link to the UpToDate guideline \"Atrial Fibrillation: Anticoagulation therapy to prevent embolization    Disclaimer: Risk Score calculation is dependent on accuracy of patient problem list and past encounter diagnosis.  Stroke Risk: CHADS2-VASc Score: 5/9 (6.7% stroke risk)   Anticoagulation: START Apixaban (Eliquis) 2.5 mg every 12 hours.  I also reminded them to watch for signs of bloody or black tarry stools and stop the medication immediately if this develops as this could be life threatening.  Echo reviewed, normal ejection fraction, moderate tricuspid regurgitation and moderate pulm hypertension noted.  Prognosis is poor secondary to multiple comorbidities including dementia. Family considering hospice which I think is extremely reasonable.  Acute on chronic diastolic heart failure: New York Heart Association Class: IV (Severe limitations, symptoms even at rest)    Acute on chronic diastolic heart failure and severe pulmonary hypertension.  Beta Blocker: Continue Metoprolol succinate (Toprol XL) 100 mg daily.  ACE Inibitor/ARB: Continue lisinopril 10 mg daily.  Diuretics: Despite elevated BNP, I will not start her on diuretics at this point. She is laying flat comfortably.  Closely monitor kidney function and electrolytes.  Daily weight, fluid restriction and low-salt diet. · Chronic Anemia  · HgB 10.4 as of 4/22/2022. · No obvious source of bleeding now. Once again, thank you for allowing me to participate in this patients care. Please do not hesitate to contact me if I could be of any further assistance. Sincerely,  Stephen Robles PA-C  [address] [place] [address] Cardiology Specialist    [address] [place] [address] [facility] [place], [address] [place] [address]  Phone: [phone], Fax: [phone]     I believe that the risk of significant morbidity and mortality related to the patient's current medical conditions are: high. April 22, 2022     ATTESTATION:     I have discussed the case, including pertinent history and exam findings with the Kinjal WELLS. I have evaluated the  History, physical findings and pictures of the patient and the key elements of the encounter have been performed by me. I have reviewed the laboratory data, other diagnostic studies and discussed them with Kinjal WELLS. I have updated the medical record where necessary. I agree with the assessment, plan and orders as documented by Stephen WELLS. Sincerely,  Thanh Cuellar MD, F.A.C.C.   [facility] Cardiology Specialist    [address], [address] [facility]  Phone: [phone], Fax: [phone]

## 2022-04-22 NOTE — PROGRESS NOTES
Pt resting in bed quietly with eyes closed. Respirations even and unlabored. No distress noted. Assessment and vital signs as charted. FLACC = 0. Pt opens eyes to voice but will not talk to writer. Cardiac monitor shows NSR and A-fib. Nasal canula on at 2L. Continuous pulse ox remains WNL. IV medication infusing as ordered. Kwan catheter in place. Bed alarm on. Call light in reach. Will continue to monitor.

## 2022-04-22 NOTE — CARE COORDINATION
Faxed paperwork for home Oxygen need to [facility] equipment, including Oxygen order, face sheet, respiratory eval and recent progress note. Spoke with George Michael in the office who will follow through with managing the home oxygen need.    PEDRO Bush RN

## 2022-04-22 NOTE — PROGRESS NOTES
Pt keeps pulling nasal canula off. Writer has gone in several times to put it back on. Respirations between 20's-30's. No distress noted. Will continue to monitor.

## 2022-04-22 NOTE — PLAN OF CARE
Problem: Discharge Planning  Goal: Discharge to home or other facility with appropriate resources  Outcome: Completed     Problem: Safety - Adult  Goal: Free from fall injury  Outcome: Completed     Problem: Skin/Tissue Integrity  Goal: Absence of new skin breakdown  Description: 1. Monitor for areas of redness and/or skin breakdown  2. Assess vascular access sites hourly  3. Every 4-6 hours minimum:  Change oxygen saturation probe site  4. Every 4-6 hours:  If on nasal continuous positive airway pressure, respiratory therapy assess nares and determine need for appliance change or resting period.   Outcome: Completed     Problem: Chronic Conditions and Co-morbidities  Goal: Patient's chronic conditions and co-morbidity symptoms are monitored and maintained or improved  Outcome: Completed     Problem: Nutrition Deficit:  Goal: Optimize nutritional status  Outcome: Completed     Problem: Pain  Goal: Verbalizes/displays adequate comfort level or baseline comfort level  Outcome: Completed

## 2022-04-22 NOTE — PROGRESS NOTES
Pt resting in bed with eyes closed. Assessment and vital signs as charted. Pt continues to pull nasal canula out of nose. Respirations increased high 20's to mid 30's. Will give PRN morphine for respiratory distress. Pt's brief changed at this time, BM noted. Nasal canula on at 2L. Pt repositioned to left side with pillow support. Pt will not open mouth for oral care. Bed alarm on. Call light in reach. Will continue to monitor.

## 2022-04-22 NOTE — PROGRESS NOTES
[facility]   Cardiopulmonary Services  [address], [address]           A home oxygen evaluation has been completed. Patient was placed on room air for 10 minutes. SpO2 was 87 % on room air at rest. Oxygen was applied at 2 lpm via nasal cannula to maintain a SpO2 between 90-92%. Actual SpO2 was 92 % with oxygen at 2 lpm. Pt does qualify for home oxygen.

## 2022-04-22 NOTE — PROGRESS NOTES
[facility]  SPEECH THERAPY  No Visit Note    [x] ICU    [] Acute   Patient: Genaro Brooks  Room: H7/E344-87      Genaro Brooks not seen on 4/22/2022 at 8:10 AM due to patient unable to arouse for breakfast meal, despite attempts from [address] [place] [address] will re-attempt when appropriate.          Signature: Nando WEBBER SLP-Student

## 2022-04-22 NOTE — PROGRESS NOTES
Informed that daughter has [address] and Living Will paperwork for me. I have a discussion with Jenna Ruby who is Primary decision maker and caretaker for the patient. She asks about what it means for her as being her mothers agent. I describe the legal roles that she has, and that her sister is secondary decision maker. She wants to discuss different levels of code status, I educate on all 3 levels with questions answered. Jenna Ruby states that she is thinking about switching her mother to a DNR-CC, but she was not sure about what level of treatment she would receive. I answer questions. Jenna Ruby said that she would like to talk with her sister then decide. We talk about hospice care as well and what services they can provide. I state that she does not have to use them now, but that I wanted her to have the information available to her. Jenna Ruby denies any needs at this time. Information left and [facility] and Living Will placed in paper chart.     [address] Nurse Coordinator  4/22/2022 10:20 AM

## 2022-04-22 NOTE — PROGRESS NOTES
Assumed care of patient. Resting quietly in bed, patient severely hard of hearing, daughter at bedside able to get a response by yelling in pts left ear. Patient refusing to eat with family. Family placing small amounts of puree diet on lips and patient will lick off but won't take bites. Coughing noted occasionally with intake. O2 sat consistently 89%, placed on O2 at 2L/NC. Remains in Afib, on amiodorone gtt.

## 2022-04-22 NOTE — PROGRESS NOTES
[facility] SPECIALTY [facility]  SPEECH THERAPY  No Visit Note    [x] ICU    [] Acute   Patient: Alex Carter  Room: B718/I560-69      Alex Carter not seen on 4/22/2022 at 4:25 PM due to pt is discharged home and was not aroused by SLP attempts        Signature: Maira Bernard M.S.,CCC-SLP

## 2022-04-22 NOTE — PROGRESS NOTES
Telemetry appears to be NSR. EKG performed, reads NSR. Informed Charlene Mooneyfoot. See new orders.

## 2022-04-22 NOTE — PROGRESS NOTES
RESPIRATORY ASSESSMENT PROTOCOL                                                                                              Patient Name: Jessica James Room#: L218/L426-68 : 1927     Admitting diagnosis: Shortness of breath [R06.02]  Pleural effusion [J90]  Pneumonia of right upper lobe due to infectious organism [J18.9]  Anemia, unspecified type [D64.9]  Community acquired pneumonia of right upper lobe of lung [J18.9]       Medical History:   Past Medical History:   Diagnosis Date    Acute respiratory failure with hypoxia ([facility] 2022    Arthritis     Asthma     Atrial fibrillation with RVR ([facility] 2022    [facility]     Diverticulitis     Female stress incontinence     Hyperlipidemia     Hypertension     Hypomagnesemia 2022    Pulmonary HTN ([facility]     Sepsis ([facility] 2022    Thyroid disease        PATIENT ASSESSMENT    LABORATORY DATA  Hematology:   Lab Results   Component Value Date    WBC 15.5 2022    RBC 3.40 2022    HGB 10.4 2022    HCT 31.1 2022     2022     Chemistry:    Lab Results   Component Value Date    PHART 7.412 2022    SZF3EFL 29.4 2022    PO2ART 69.7 2022    K5VCZBRX 94.5 2022    BZF6QZM 18.3 2022       VITALS  Pulse: 102   Resp: 22  BP: (!) 141/78  SpO2: 92 % O2 Device: Nasal cannula  Temp: 98.6 °F (37 °C)    SKIN COLOR  [x] Normal  [] Pale  [] Dusky  [] Cyanotic    RESPIRATORY PATTERN  [x] Normal  [] Dyspnea  [] Cheyne-Irwin  [] Kussmaul  [] Biots    AMBULATORY  [] Yes  [] No  [x] With Assistance    PEAK FLOW  Predicted:     Personal Best:        VITAL CAPACITY  Predicted value:  ml  Actual Value:  ml  30% of Predicted:  ml  Patient Acuity 0 1 2 3 4 Score   Level of Concious (LOC) []  Alert & Oriented or Pt normal LOC [x]  Confused;follows directions []  Confused & uncooper-ative []  Obtunded []  Comatose 1   Respiratory Rate  (RR) []  Reg. rate & pattern.  12 - 20 bpm  [x]  Increased RR. Greater than 20 bpm   []  SOB w/ exertion or RR greater than 24 bpm []  Access- ory muscle use at rest. Abn.  resp. []  SOB at rest.   1   Bilateral Breath Sounds (BBS) []  Clear [x]  Diminish-ed bases  []  Diminish-ed t/o, or rales   []  Sporadic, scattered wheezes or rhonchi []  Persistentwheezes and, or absent BBS 1   Cough []  Strong, effective, & non-prod. []  Effective & prod. Less than 25 ml (2 TBSP) over past 24 hrs [x]  Ineffective & non-prod to less than 25 ML over past 24 hrs []  Ineffective and, or greater than 25 ml sputum prod. past 24 hrs. []  Nonspon- taneous; Requires suctioning 2   Pulmonary History  (PULM HX) []  No smoking and no chronic pulmonaryhistory []  Former smoker. Quit over 12 mos. ago []  Current smoker or quit w/ in 12 mos []  Pulm. History and, or 20 pk/yr smoking hx [x]  Admitted w/ acute pulm. dx and, or has been admitted w/ pulm. dx 2 or more times over past 12 mos 4   Surgical History this Admit  (SURG HX) [x]  No surgery []  General surgery []  Lower abdominal []  Thoracic or upper abdominal   []  Thoracic w/ pulm. disease 0   Chest X-Ray (CXR)/CT Scan []  Clear or not applicable []  Not available []  Atelect- asis or pleural effusions []  Localized infiltrate or pulm. edema [x]  Con-solidated Infiltrates, bilateral, or in more than 1 lobe 4   Slow or Forced VC, FEV1 OR PEFR (PULM FXN)  [x]  80% or greater, or not indicated []  Pt. unable to perform []  FEV1 or PEFR or VC 51-79%.   []  FEV1 or PEFR or VC  30-49%   []  FEV1 or PEFR or VC less than 30%   0   TOTAL ACUITY: 13       CARE PLAN    If Acuity Level is 2, 3, or 4 in any of the following:    [] BILATERAL BREATH SOUNDS (BBS)     [x] PULMONARY HISTORY (PULM HX)  [] PULMONARY FUNCTION (PULM FX)    Goal: Improve respiratory functions in patients with airway disease and decrease WOB    [x] AEROSOL PROTOCOL    Total Acuity:   16-32  []  Secondary Assessment in 24 hrs Total Acuity:  9-15  [x]  Secondary Assessment in 24 hrs Total Acuity:  4-8  []  Secondary Assessment in 48 hrs Total Acuity:  0-3  []  Secondary Assessment in 72 hrs   HHN AEROSOL THERAPY with  [physician-ordered bronchodilator(s)] q 4 & Albuterol PRN q2 hrs. Breath-Actuated Neb if BBS Acuity = 4, and pt. can use MP. Notify physician if condition deteriorates. HHN AEROSOL THERAPY with  [physician-ordered bronchodilator(s)]  QID and Albuterol PRN q4 hrs. Breath-Actuated Neb if BBS Acuity = 4, and pt. can use MP. Notify physician if condition deteriorates. MDI THERAPY with  2 actuations of [physician-ordered bronchodilator(s)] via spacer TID Albuterol and PRNq4 hrs. If unable to utilize MDI: HHN [physician-ordered bronchodilator(s)] TID and Albuterol PRN q4 hrs. Notify physician if condition deteriorates. MDI THERAPY with  [physician-ordered bronchodilator(s)] via spacer TID PRN. If unable to utilize MDI: HHN [physician-ordered bronchodilator(s)] TID PRN. Notify physician if condition deteriorates. If Acuity Level is 2, 3, or 4 in any of the following:    [] COUGH     [] SURGICAL HISTORY (SURG HX)  [x] CHEST XRAY (CXR)    Goal: Improvement in sputum mobilization in patients with ineffective airway clearance. Reverse atelectasis.     [x] Bronchopulmonary Hygiene Protocol    Total Acuity:   16-32  []  Secondary Assessment in 24 hrs Total Acuity:  9-15  [x]  Secondary Assessment in 24 hrs Total Acuity:  4-8  []  Secondary Assessment in 48 hrs Total Acuity:  0-3  []  Secondary Assessment in 72 hrs   METANEB QID with [physician-ordered bronchodilator(s)] if CXR Acuity = 4; otherwise:  PD&P, PEP, or Vest QID & PRN  NT Sxn PRN for ineffective cough  METANEB QID with [physician-ordered bronchodilator(s)] if CXR Acuity = 4; otherwise:  PD&P, PEP, or Vest TID & PRN  NT Sxn PRN for ineffective cough  Instruct patient to self-perform IS q1hr WA   Directed Cough self-performed q1hr WA     If Acuity Level is 2 or above in the following:    [] PULMONARY HISTORY (PULM HX)    Goal: Assist patient in quitting smoking to slow or stop the progression of lung disease.     [] Smoking Cessation Protocol    SMOKING CESSATION EDUCATION provided according to policy [id number]: (heraclio with an X)  ____Yes    ____ No     ____ NA    Smoking Cessation Booklet given:  ____Yes  ____No ____Patient Elizabeth Walker

## 2022-04-22 NOTE — PROGRESS NOTES
INTENSIVE CARE UNIT   APRN - Progress Note    Patient - Gustabo Brown  Date of Admission -  4/20/2022 12:59 PM  Date of Evaluation -  4/22/2022  Hospital Day - 2      SUBJECTIVE:     The Gustabo Brown is a 80 y.o. female who is seen for follow up in the ICU. Per nursing report and notes, overnight events include: continued Afib with RVR and tachypnea. She is resting in bed eyes closed easy respiration. No distress    I did speak with the daughter regarding her mother and her current condition. We did discuss in depth her medications, both laboratory and radiology findings. We discussed pneumonia, CHF as well as atrial fibrillation with RVR at this time. We discussed her baseline mentation and activity. She is nonambulatory and is 100% care at home. She does have severe dementia. Currently the patient is a DNR CC arrest she is considering changing her to a DNR CC. She stated she will talk to her sister tonight and come up with a plan. If they decide to make her a DNR CC her plan will be to take her home as she has a hospital bed at home. She does not have oxygen at home however we did discuss hospice care who would provide all extra necessities including oxygen for them. She stated she would talk with her family tonight and come up with a plan of care for her. ROS:  Patient unable    All other systems were reviewed with the patient and are negative unless otherwise stated in HPI.       OBJECTIVE:     VITAL SIGNS:  Patient Vitals for the past 8 hrs:   BP Temp Temp src Pulse Resp SpO2 Weight   04/22/22 1200 130/79 -- Temporal 97 23 (!) 89 % --   04/22/22 1145 132/69 -- -- 110 21 93 % --   04/22/22 1130 127/68 -- -- 108 21 92 % --   04/22/22 1115 131/67 -- -- 100 22 94 % --   04/22/22 1100 117/76 -- -- 94 23 95 % --   04/22/22 1056 -- -- -- -- 22 -- --   04/22/22 1045 123/69 -- -- 85 24 94 % --   04/22/22 1030 131/68 -- -- 88 26 94 % --   04/22/22 1015 (!) 144/77 -- -- 99 27 92 % --   04/22/22 1000 (!) 141/78 -- -- 102 30 92 % --   22 0945 129/73 -- -- 90 23 93 % --   22 0930 (!) 134/97 -- -- 106 22 93 % --   22 0915 (!) 144/90 -- -- 97 27 93 % --   22 0900 127/70 -- -- 101 23 94 % --   22 0845 127/80 -- -- 127 24 93 % --   22 0830 137/69 -- -- 125 (!) 36 93 % --   22 0815 137/78 -- -- 108 25 91 % --   22 0800 120/70 -- -- 125 22 93 % --   22 0745 137/77 -- -- 120 25 (!) 88 % --   22 0730 (!) 143/78 -- -- 131 25 (!) 89 % --   22 0715 132/65 -- -- 118 24 91 % --   22 0700 131/65 -- -- 130 22 93 % --   22 0645 135/66 -- -- 130 25 93 % --   22 0630 (!) 119/57 98.6 °F (37 °C) Temporal 110 25 91 % --   22 0615 131/66 -- -- 121 23 93 % --   22 0600 137/62 -- -- 131 24 93 % --   22 0545 (!) 140/66 -- -- 116 25 93 % --   22 0530 (!) 152/122 -- -- 102 28 91 % --   22 0515 (!) 172/89 -- -- 100 (!) 35 (!) 89 % --   22 0500 (!) 173/85 -- -- 107 21 (!) 89 % --   22 0445 139/65 98.2 °F (36.8 °C) Temporal 104 29 (!) 87 % 95 lb 12.8 oz (43.5 kg)   22 0430 (!) 151/84 -- -- 105 25 (!) 87 % --   22 0415 (!) 147/68 -- -- 104 (!) 34 (!) 87 % --         Temp: 98.6 °F (37 °C)  Temp range:    Temp  Av.2 °F (36.8 °C)  Min: 97.8 °F (36.6 °C)  Max: 98.6 °F (37 °C)    BP: 130/79  BP Range:      Systolic (00EUN), RFH:714 , Min:107 , XPI:670      Diastolic (47ZPZ), NGS:30, Min:46, Max:122    Pulse: 97  Pulse Range:    Pulse  Av.7  Min: 83  Max: 148    Resp: 23  Resp Range:   Resp  Av.2  Min: 14  Max: 39    SpO2: (!) 89 % on supplemental O2  SpO2 range:   SpO2  Av.1 %  Min: 86 %  Max: 96 %    Weight  Wt Readings from Last 3 Encounters:   22 95 lb 12.8 oz (43.5 kg)   22 91 lb 11.2 oz (41.6 kg)   20 96 lb 8 oz (43.8 kg)     Body mass index is 23.1 kg/m².     24HR INTAKE/OUTPUT:      Intake/Output Summary (Last 24 hours) at 2022 1202  Last data filed at 4/22/2022 0445  Gross per 24 hour   Intake 463 ml   Output 950 ml   Net -487 ml     Date 04/22/22 0000 - 04/22/22 2359   Shift 8812-8301 1203-7104 2613-2554 24 Hour Total   INTAKE   P.O. 0   0   I. V.(mL/kg/hr) 343(1)   343   Shift Total(mL/kg) 343(7.9)   343(7.9)   OUTPUT   Urine(mL/kg/hr) 550(1.6)   550   Shift Total(mL/kg) 550(12.7)   550(12.7)   Weight (kg) 43.5 43.5 43.5 43.5         PHYSICAL EXAM:  GEN:    Awake and following commands:     [x] No   [] Yes  MENTAL STATUS: lethargic. DISTRESS: Acute respiratory distress:       [x] No   [] Yes  EYES:  EOMI, pupils equal   NECK: Supple. No lymphadenopathy. No carotid bruit  CVS:    irregular and tachycardic, Afib/Flutter with RVR, no audible murmur  PULM:  diminished with scattered rhonchi, no acute respiratory distress  ABD:    Bowels sounds normal.  Abdomen is soft. No distention. no tenderness to palpation. EXT:   no edema bilaterally . No calf tenderness. NEURO: Moves all extremities. Motor and sensory are grossly intact  SKIN:  No rashes. No skin lesions.           MEDICATIONS:  Scheduled Meds:   amLODIPine  5 mg Oral Daily    levothyroxine  50 mcg Oral Daily    lisinopril  10 mg Oral 2 times per day    metoprolol succinate  100 mg Oral Daily    montelukast  10 mg Oral Nightly    pantoprazole  40 mg Oral QAM AC    vitamin B-12  500 mcg Oral Nightly    sodium chloride flush  5-40 mL IntraVENous 2 times per day    heparin (porcine)  5,000 Units SubCUTAneous BID    cefTRIAXone (ROCEPHIN) IV  1,000 mg IntraVENous Q24H    And    azithromycin  500 mg Oral Q24H    ipratropium-albuterol  1 ampule Inhalation 4x Daily     Continuous Infusions:   amiodarone 0.5 mg/min (04/22/22 0012)    sodium chloride Stopped (04/20/22 1850)    sodium chloride       PRN Meds:   morphine, 2 mg, Q4H PRN  magnesium sulfate, 1,000 mg, PRN  sodium chloride flush, 5-40 mL, PRN  sodium chloride, , PRN  ondansetron, 4 mg, Q8H PRN   Or  ondansetron, 4 mg, Q6H PRN  polyethylene glycol, 17 g, Daily PRN  acetaminophen, 650 mg, Q6H PRN   Or  acetaminophen, 650 mg, Q6H PRN            VASOPRESSORS:    [x] No      [] Yes  [] Levophed      [] Dopamine     [] Vasopressin      [] Dobutamine     [] Phenylephrine     [] Epinephrine        DATA:  Complete Blood Count:   Recent Labs     04/20/22  1300 04/21/22  0619 04/22/22  0515   WBC 26.6* 19.2* 15.5*   RBC 3.49* 3.23* 3.40*   HGB 10.7* 9.8* 10.4*   HCT 32.8* 30.8* 31.1*   MCV 94.0 95.4 91.5   RDW 14.2 14.5* 14.2    194 266   SEGS 86* 86* 80*   NEUTROABS 22.87* 16.50* 12.58*   LYMPHOPCT 13* 8* 11*   LYMPHSABS 3.46 1.55 1.62   MONOPCT 1* 4 7   EOSRELPCT 0* 0* 1   BASOPCT 0 0 0   IMMGRAN 0 1* 1*        Recent Blood Glucose:   Recent Labs     04/20/22  1300 04/21/22  0619 04/22/22  0515   GLUCOSE 132* 107* 124*        Comprehensive Metabolic Profile:   Recent Labs     04/20/22  1300 04/21/22  0619 04/22/22  0515   BUN 35* 34* 21   CREATININE 1.10* 1.12* 0.77    137 138   K 3.4* 3.8 3.5*    106 105   MG  --   --  0.9*   CALCIUM 8.9 8.7 8.1*   ANIONGAP 14 12 15   CO2 19* 19* 18*   PROT 7.0 6.3* 6.7   LABALBU 3.7 2.9* 3.3*   BILITOT 0.70 0.35 0.39   ALKPHOS 56 56 70   AST 19 19 24   ALT 10 10 15        Urinalysis:   Lab Results   Component Value Date    NITRU NEGATIVE 01/20/2022    COLORU Yellow 01/20/2022    PHUR 7.0 01/20/2022    WBCUA 0 TO 2 01/20/2022    RBCUA 0 TO 2 01/20/2022    MUCUS NOT REPORTED 01/20/2022    TRICHOMONAS NOT REPORTED 01/20/2022    YEAST NOT REPORTED 01/20/2022    BACTERIA 2+ 01/20/2022    SPECGRAV 1.010 01/20/2022    LEUKOCYTESUR NEGATIVE 01/20/2022    UROBILINOGEN Normal 01/20/2022    BILIRUBINUR NEGATIVE 01/20/2022    GLUCOSEU NEGATIVE 01/20/2022    KETUA NEGATIVE 01/20/2022    AMORPHOUS NOT REPORTED 01/20/2022       HgBA1c:  No results found for: LABA1C    TSH:    Lab Results   Component Value Date    TSH 2.40 10/18/2021       Lactic Acid:   Lab Results   Component Value Date    LACTA 2.0 01/20/2022        High Sensitivity Troponin:  Recent Labs     04/20/22  1300 04/20/22  1547 04/21/22  1727   TROPHS 52* 43* 38*     Pro-BNP:  Lab Results   Component Value Date    PROBNP 6,063 (H) 04/20/2022     D-Dimer:No results found for: DDIMER  PT/INR:  No results found for: PROTIME, INR  PTT:  No results found for: APTT, PTT    CRP: No results for input(s): CRP in the last 72 hours. ABGs:   Lab Results   Component Value Date    PHART 7.412 04/21/2022    PND6SRU 29.4 04/21/2022    PO2ART 69.7 04/21/2022    DBN9ONM 18.3 04/21/2022    Q1ECDMOI 94.5 04/21/2022    FIO2 28 04/21/2022         Radiology/Imaging:  CT Head WO Contrast   Final Result   No acute intracranial abnormality. Prominent cerebral atrophy. Significant   hypodensity of the white matter in keeping with microvascular disease. Small   lacunar infarct in the region of the anterior limb of the right internal   capsule. XR CHEST PORTABLE   Final Result   Right upper lobe airspace consolidation compatible with pneumonia. Some   atelectatic changes may also be present. Questionable small right pleural effusion. ASSESSMENT / PLAN:     Right upper lobe pneumonia  · Continue current therapy  ? Antibiotics: Rocephin and Azithromycin   ? Fluids: contraindicated due to symptomatic CHF and fluid overload   ? Labs: monitor CBC with diff and CMP daily. Trend Lactic acid  ? Cultures: blood cultures x 2-negative   ? Imaging: all imaging studies reviewed   ? Vasopressors: not indicated at this time   ? Nebs  · Acute respiratory failure with hypoxia  ? Supplemental oxygen  ? Nebs  · Chronic diastolic CHF  ? Continue amlodipine, lisinopril, Toprol-XL  · CKD stage III  ? Kwan  ? Monitor output  ?  Trend labs  · Atrial fibrillation with RVR  · Appreciate cardiology  · Amiodarone drip  · Continue Toprol-XL  · Continue heparin  · Hypomagnesemia  · Magnesium protocol  · Daily magnesium levels  · Nutrition status:   · at risk for malnutrition  · Dietician consult initiated  · [] NPO [] TPN      [] Tube feed [] Clear liquid        [] Full liquid [x] regular diet         [] Fluid restriction   [] Diabetic diet   · ICU Prophylaxis:   · DVT: Heparin   · Stress Ulcer: PPI   · High risk medications: Amiodarone drip   · Disposition:    · Transfer out of ICU:  [] yes [x] no   · Discharge plan is home  · Total critical care time caring for this patient with life threatening, unstable organ failure, including direct patient contact, management of life support systems, review of data including imaging and labs, discussions with other team members and physicians at least 0 minutes so far today, excluding separately billable procedures.           Nanci Hamlin, APRN - CNP , APRN-NP-C  4/22/2022  12:02 PM

## 2022-04-22 NOTE — PROGRESS NOTES
Family at bedside. Do not want to take pt home with hunter catheter. Will discontinue hunter catheter to discharge pt.

## 2022-04-22 NOTE — PROGRESS NOTES
Updated patient's daughter on patient status and morning labs. Daughter states that she has been thinking about changing patients DNR to a CC. Asked to speak with palliative care nurse. Jocelyne Erps at bedside to discuss.

## 2022-05-08 NOTE — PROGRESS NOTES
Pt responds to voice and follows commands for vital signs. Family at bedside states this is pts baseline and that pt does not talk very often. Family states she did not eat today and has had a increase in loss of appetite. Vitals and assessment completed at this time. Pt does not show any signs of distress or pain. Family brought signed DNR paperwork. Will continue to monitor. soft/nondistended/nontender

## 2022-08-23 NOTE — PROGRESS NOTES
[facility]  Inpatient/Observation/Outpatient Rehabilitation    Date: 2022  Patient Name: Rachael Geiger       [x] Inpatient Acute/Observation       []  Outpatient  : 1927       [] Pt no showed for scheduled appointment    [] Pt refused/declined therapy at this time due to:           [x] Pt cancelled due to:  [] No Reason Given   [] Sick/ill   [x] Other: Patient continues to be hard to arouse/not responsive per RN at this time, not appropriate for therapy. Therapist/Assistant will attempt to see this patient, at our earliest opportunity.        Jordin Blood, OTR/L Date: 2022 no

## 2023-06-13 NOTE — PROGRESS NOTES
[facility]  Inpatient/Observation/Outpatient Rehabilitation    Date: 2022  Patient Name: Milan Atwood       [x] Inpatient Acute/Observation   : 1927     [x] Pt cancelled due to:  [] No Reason Given   [] Sick/ill   [x] Other:  Pt not approprate at this time per RN. Therapist/Assistant will attempt to see this patient, at our earliest opportunity.        Charlie Gonzalez, PT, DPT Date: 2022 Zoryve Counseling:  I discussed with the patient that Zoryve is not for use in the eyes, mouth or vagina. The most commonly reported side effects include diarrhea, headache, insomnia, application site pain, upper respiratory tract infections, and urinary tract infections.  All of the patient's questions and concerns were addressed.